# Patient Record
Sex: FEMALE | Race: WHITE | NOT HISPANIC OR LATINO
[De-identification: names, ages, dates, MRNs, and addresses within clinical notes are randomized per-mention and may not be internally consistent; named-entity substitution may affect disease eponyms.]

---

## 2018-04-04 ENCOUNTER — RECORD ABSTRACTING (OUTPATIENT)
Age: 68
End: 2018-04-04

## 2018-04-09 ENCOUNTER — APPOINTMENT (OUTPATIENT)
Dept: THORACIC SURGERY | Facility: CLINIC | Age: 68
End: 2018-04-09
Payer: MEDICARE

## 2018-04-09 VITALS
SYSTOLIC BLOOD PRESSURE: 188 MMHG | HEART RATE: 77 BPM | HEIGHT: 63 IN | RESPIRATION RATE: 16 BRPM | OXYGEN SATURATION: 99 % | WEIGHT: 160 LBS | BODY MASS INDEX: 28.35 KG/M2 | DIASTOLIC BLOOD PRESSURE: 98 MMHG

## 2018-04-09 DIAGNOSIS — Z82.49 FAMILY HISTORY OF ISCHEMIC HEART DISEASE AND OTHER DISEASES OF THE CIRCULATORY SYSTEM: ICD-10-CM

## 2018-04-09 PROCEDURE — 99205 OFFICE O/P NEW HI 60 MIN: CPT

## 2018-04-09 RX ORDER — PNV NO.95/FERROUS FUM/FOLIC AC 28MG-0.8MG
TABLET ORAL
Refills: 0 | Status: ACTIVE | COMMUNITY

## 2018-04-09 RX ORDER — UBIDECARENONE/VIT E ACET 100MG-5
CAPSULE ORAL
Refills: 0 | Status: ACTIVE | COMMUNITY

## 2018-04-09 RX ORDER — ASPIRIN ENTERIC COATED TABLETS 81 MG 81 MG/1
81 TABLET, DELAYED RELEASE ORAL
Refills: 0 | Status: ACTIVE | COMMUNITY

## 2018-04-12 ENCOUNTER — APPOINTMENT (OUTPATIENT)
Dept: NUCLEAR MEDICINE | Facility: CLINIC | Age: 68
End: 2018-04-12
Payer: MEDICARE

## 2018-04-12 ENCOUNTER — OUTPATIENT (OUTPATIENT)
Dept: OUTPATIENT SERVICES | Facility: HOSPITAL | Age: 68
LOS: 1 days | End: 2018-04-12

## 2018-04-12 DIAGNOSIS — Z00.8 ENCOUNTER FOR OTHER GENERAL EXAMINATION: ICD-10-CM

## 2018-04-12 PROCEDURE — 78815 PET IMAGE W/CT SKULL-THIGH: CPT | Mod: 26,PI

## 2018-04-16 ENCOUNTER — APPOINTMENT (OUTPATIENT)
Dept: THORACIC SURGERY | Facility: CLINIC | Age: 68
End: 2018-04-16
Payer: MEDICARE

## 2018-04-16 VITALS
HEART RATE: 78 BPM | RESPIRATION RATE: 16 BRPM | SYSTOLIC BLOOD PRESSURE: 210 MMHG | DIASTOLIC BLOOD PRESSURE: 110 MMHG | OXYGEN SATURATION: 98 % | HEIGHT: 63 IN | BODY MASS INDEX: 28.35 KG/M2 | WEIGHT: 160 LBS

## 2018-04-16 PROCEDURE — 99215 OFFICE O/P EST HI 40 MIN: CPT

## 2018-04-27 ENCOUNTER — OUTPATIENT (OUTPATIENT)
Dept: OUTPATIENT SERVICES | Facility: HOSPITAL | Age: 68
LOS: 1 days | End: 2018-04-27
Payer: MEDICARE

## 2018-04-27 VITALS
HEART RATE: 64 BPM | HEIGHT: 63 IN | TEMPERATURE: 97 F | DIASTOLIC BLOOD PRESSURE: 100 MMHG | WEIGHT: 169.09 LBS | RESPIRATION RATE: 16 BRPM | SYSTOLIC BLOOD PRESSURE: 200 MMHG

## 2018-04-27 DIAGNOSIS — Z29.9 ENCOUNTER FOR PROPHYLACTIC MEASURES, UNSPECIFIED: ICD-10-CM

## 2018-04-27 DIAGNOSIS — R91.8 OTHER NONSPECIFIC ABNORMAL FINDING OF LUNG FIELD: ICD-10-CM

## 2018-04-27 DIAGNOSIS — Z87.42 PERSONAL HISTORY OF OTHER DISEASES OF THE FEMALE GENITAL TRACT: Chronic | ICD-10-CM

## 2018-04-27 DIAGNOSIS — Z01.818 ENCOUNTER FOR OTHER PREPROCEDURAL EXAMINATION: ICD-10-CM

## 2018-04-27 DIAGNOSIS — I10 ESSENTIAL (PRIMARY) HYPERTENSION: ICD-10-CM

## 2018-04-27 DIAGNOSIS — D25.9 LEIOMYOMA OF UTERUS, UNSPECIFIED: Chronic | ICD-10-CM

## 2018-04-27 LAB
ANION GAP SERPL CALC-SCNC: 12 MMOL/L — SIGNIFICANT CHANGE UP (ref 5–17)
APTT BLD: 30.1 SEC — SIGNIFICANT CHANGE UP (ref 27.5–37.4)
BLD GP AB SCN SERPL QL: SIGNIFICANT CHANGE UP
BUN SERPL-MCNC: 19 MG/DL — SIGNIFICANT CHANGE UP (ref 8–20)
CALCIUM SERPL-MCNC: 9.2 MG/DL — SIGNIFICANT CHANGE UP (ref 8.6–10.2)
CHLORIDE SERPL-SCNC: 104 MMOL/L — SIGNIFICANT CHANGE UP (ref 98–107)
CO2 SERPL-SCNC: 27 MMOL/L — SIGNIFICANT CHANGE UP (ref 22–29)
CREAT SERPL-MCNC: 0.65 MG/DL — SIGNIFICANT CHANGE UP (ref 0.5–1.3)
GLUCOSE SERPL-MCNC: 91 MG/DL — SIGNIFICANT CHANGE UP (ref 70–115)
HCT VFR BLD CALC: 41.7 % — SIGNIFICANT CHANGE UP (ref 37–47)
HGB BLD-MCNC: 14.8 G/DL — SIGNIFICANT CHANGE UP (ref 12–16)
INR BLD: 0.99 RATIO — SIGNIFICANT CHANGE UP (ref 0.88–1.16)
MCHC RBC-ENTMCNC: 30 PG — SIGNIFICANT CHANGE UP (ref 27–31)
MCHC RBC-ENTMCNC: 35.5 G/DL — SIGNIFICANT CHANGE UP (ref 32–36)
MCV RBC AUTO: 84.4 FL — SIGNIFICANT CHANGE UP (ref 81–99)
PLATELET # BLD AUTO: 367 K/UL — SIGNIFICANT CHANGE UP (ref 150–400)
POTASSIUM SERPL-MCNC: 3.1 MMOL/L — LOW (ref 3.5–5.3)
POTASSIUM SERPL-SCNC: 3.1 MMOL/L — LOW (ref 3.5–5.3)
PROTHROM AB SERPL-ACNC: 10.9 SEC — SIGNIFICANT CHANGE UP (ref 9.8–12.7)
RBC # BLD: 4.94 M/UL — SIGNIFICANT CHANGE UP (ref 4.4–5.2)
RBC # FLD: 13.5 % — SIGNIFICANT CHANGE UP (ref 11–15.6)
SODIUM SERPL-SCNC: 143 MMOL/L — SIGNIFICANT CHANGE UP (ref 135–145)
TYPE + AB SCN PNL BLD: SIGNIFICANT CHANGE UP
WBC # BLD: 12 K/UL — HIGH (ref 4.8–10.8)
WBC # FLD AUTO: 12 K/UL — HIGH (ref 4.8–10.8)

## 2018-04-27 PROCEDURE — 93010 ELECTROCARDIOGRAM REPORT: CPT

## 2018-04-27 RX ORDER — CEFAZOLIN SODIUM 1 G
2000 VIAL (EA) INJECTION ONCE
Qty: 0 | Refills: 0 | Status: DISCONTINUED | OUTPATIENT
Start: 2018-05-11 | End: 2018-05-11

## 2018-04-27 NOTE — H&P PST ADULT - ASSESSMENT
CAPRINI SCORE [CLOT]    AGE RELATED RISK FACTORS                                                       MOBILITY RELATED FACTORS  [ ] Age 41-60 years                                            (1 Point)                  [ ] Bed rest                                                        (1 Point)  [x] Age: 61-74 years                                           (2 Points)                 [ ] Plaster cast                                                   (2 Points)  [ ] Age= 75 years                                              (3 Points)                 [ ] Bed bound for more than 72 hours                 (2 Points)    DISEASE RELATED RISK FACTORS                                               GENDER SPECIFIC FACTORS  [ ] Edema in the lower extremities                       (1 Point)                  [ ] Pregnancy                                                     (1 Point)  [ ] Varicose veins                                               (1 Point)                  [ ] Post-partum < 6 weeks                                   (1 Point)             [x ] BMI > 25 Kg/m2                                            (1 Point)                  [ ] Hormonal therapy  or oral contraception          (1 Point)                 [ ] Sepsis (in the previous month)                        (1 Point)                  [ ] History of pregnancy complications                 (1 point)  [ ] Pneumonia or serious lung disease                                               [ ] Unexplained or recurrent                     (1 Point)           (in the previous month)                               (1 Point)  [ ] Abnormal pulmonary function test                     (1 Point)                 SURGERY RELATED RISK FACTORS  [ ] Acute myocardial infarction                              (1 Point)                 [ ]  Section                                             (1 Point)  [ ] Congestive heart failure (in the previous month)  (1 Point)               [ ] Minor surgery                                                  (1 Point)   [ ] Inflammatory bowel disease                             (1 Point)                 [ ] Arthroscopic surgery                                        (2 Points)  [ ] Central venous access                                      (2 Points)                [x ] General surgery lasting more than 45 minutes   (2 Points)       [ ] Stroke (in the previous month)                          (5 Points)               [ ] Elective arthroplasty                                         (5 Points)                                                                                                                                               HEMATOLOGY RELATED FACTORS                                                 TRAUMA RELATED RISK FACTORS  [ ] Prior episodes of VTE                                     (3 Points)                 [ ] Fracture of the hip, pelvis, or leg                       (5 Points)  [ ] Positive family history for VTE                         (3 Points)                 [ ] Acute spinal cord injury (in the previous month)  (5 Points)  [ ] Prothrombin 98310 A                                     (3 Points)                 [ ] Paralysis  (less than 1 month)                             (5 Points)  [ ] Factor V Leiden                                             (3 Points)                  [ ] Multiple Trauma within 1 month                        (5 Points)  [ ] Lupus anticoagulants                                     (3 Points)                                                           [ ] Anticardiolipin antibodies                               (3 Points)                                                       [ ] High homocysteine in the blood                      (3 Points)                                             [ ] Other congenital or acquired thrombophilia      (3 Points)                                                [ ] Heparin induced thrombocytopenia                  (3 Points)                                          Total Score [       5   ]

## 2018-04-27 NOTE — H&P PST ADULT - FAMILY HISTORY
Father  Still living? No  Family history of heart disease, Age at diagnosis: Age Unknown     Sibling  Still living? Yes, Estimated age: 61-70  Family history of hypertension, Age at diagnosis: Age Unknown     Sibling  Still living? Yes, Estimated age: 61-70  Family history of premature CAD, Age at diagnosis: 61-70

## 2018-04-27 NOTE — H&P PST ADULT - PROBLEM SELECTOR PLAN 1
Flexible Bronchoscopy, Right Video Assisted Thorascopic Surgery  Medical Clearance  Cardiology Clearance (Per surgeon)  Pulmonary Clearance (Per surgeon)

## 2018-04-27 NOTE — H&P PST ADULT - LAB RESULTS AND INTERPRETATION
4/27/2018 Spoke with Dr. Mancini's office regarding pt's elevated BP while in PST and also abnormal WBC and Potassium levels.  Results faxed by myself (ADDIE JOSEPH)

## 2018-04-27 NOTE — H&P PST ADULT - EKG AND INTERPRETATION
EKG demonstrates Sinus Bradycardia at 55 bpm.  ST/T wave changes noted in Inferior and Lateral leads.  Pending official reading

## 2018-04-27 NOTE — H&P PST ADULT - NSANTHOSAYNRD_GEN_A_CORE
No. TODD screening performed.  STOP BANG Legend: 0-2 = LOW Risk; 3-4 = INTERMEDIATE Risk; 5-8 = HIGH Risk

## 2018-04-27 NOTE — H&P PST ADULT - HISTORY OF PRESENT ILLNESS
This is a 68 y.o female who presents to PST today.  The pt reports she went to see a new medical physician and underwent routine testing which included a chest x-ray.  An incidental finding demonstrated a right lung mass.  She currently denies any symptoms.

## 2018-04-27 NOTE — H&P PST ADULT - RS GEN PE MLT RESP DETAILS PC
diminished breath sounds, L/respirations non-labored/normal/airway patent/diminished breath sounds, R

## 2018-05-01 ENCOUNTER — APPOINTMENT (OUTPATIENT)
Dept: PULMONOLOGY | Facility: CLINIC | Age: 68
End: 2018-05-01
Payer: MEDICARE

## 2018-05-01 VITALS — HEART RATE: 65 BPM | DIASTOLIC BLOOD PRESSURE: 100 MMHG | OXYGEN SATURATION: 96 % | SYSTOLIC BLOOD PRESSURE: 160 MMHG

## 2018-05-01 DIAGNOSIS — I10 ESSENTIAL (PRIMARY) HYPERTENSION: ICD-10-CM

## 2018-05-01 PROCEDURE — 85018 HEMOGLOBIN: CPT | Mod: QW

## 2018-05-01 PROCEDURE — 94664 DEMO&/EVAL PT USE INHALER: CPT | Mod: 59

## 2018-05-01 PROCEDURE — 99205 OFFICE O/P NEW HI 60 MIN: CPT | Mod: 25

## 2018-05-01 PROCEDURE — 94060 EVALUATION OF WHEEZING: CPT

## 2018-05-01 PROCEDURE — 94729 DIFFUSING CAPACITY: CPT

## 2018-05-01 PROCEDURE — 94727 GAS DIL/WSHOT DETER LNG VOL: CPT

## 2018-05-08 ENCOUNTER — APPOINTMENT (OUTPATIENT)
Dept: PULMONOLOGY | Facility: CLINIC | Age: 68
End: 2018-05-08
Payer: MEDICARE

## 2018-05-08 VITALS — WEIGHT: 165 LBS | BODY MASS INDEX: 29.23 KG/M2

## 2018-05-08 PROCEDURE — 94010 BREATHING CAPACITY TEST: CPT

## 2018-05-10 ENCOUNTER — TRANSCRIPTION ENCOUNTER (OUTPATIENT)
Age: 68
End: 2018-05-10

## 2018-05-11 ENCOUNTER — RESULT REVIEW (OUTPATIENT)
Age: 68
End: 2018-05-11

## 2018-05-11 ENCOUNTER — INPATIENT (INPATIENT)
Facility: HOSPITAL | Age: 68
LOS: 3 days | Discharge: HOME | DRG: 165 | End: 2018-05-15
Attending: THORACIC SURGERY (CARDIOTHORACIC VASCULAR SURGERY) | Admitting: THORACIC SURGERY (CARDIOTHORACIC VASCULAR SURGERY)
Payer: MEDICARE

## 2018-05-11 ENCOUNTER — APPOINTMENT (OUTPATIENT)
Dept: THORACIC SURGERY | Facility: HOSPITAL | Age: 68
End: 2018-05-11
Payer: MEDICARE

## 2018-05-11 VITALS
RESPIRATION RATE: 16 BRPM | WEIGHT: 164.02 LBS | OXYGEN SATURATION: 99 % | TEMPERATURE: 98 F | DIASTOLIC BLOOD PRESSURE: 69 MMHG | HEART RATE: 68 BPM | SYSTOLIC BLOOD PRESSURE: 152 MMHG | HEIGHT: 63 IN

## 2018-05-11 DIAGNOSIS — Z87.42 PERSONAL HISTORY OF OTHER DISEASES OF THE FEMALE GENITAL TRACT: Chronic | ICD-10-CM

## 2018-05-11 DIAGNOSIS — D25.9 LEIOMYOMA OF UTERUS, UNSPECIFIED: Chronic | ICD-10-CM

## 2018-05-11 DIAGNOSIS — R22.2 LOCALIZED SWELLING, MASS AND LUMP, TRUNK: ICD-10-CM

## 2018-05-11 LAB
ABO RH CONFIRMATION: SIGNIFICANT CHANGE UP
POTASSIUM SERPL-MCNC: 3.8 MMOL/L — SIGNIFICANT CHANGE UP (ref 3.5–5.3)
POTASSIUM SERPL-SCNC: 3.8 MMOL/L — SIGNIFICANT CHANGE UP (ref 3.5–5.3)

## 2018-05-11 PROCEDURE — 71045 X-RAY EXAM CHEST 1 VIEW: CPT | Mod: 26

## 2018-05-11 PROCEDURE — 32663 THORACOSCOPY W/LOBECTOMY: CPT

## 2018-05-11 PROCEDURE — 85027 COMPLETE CBC AUTOMATED: CPT

## 2018-05-11 PROCEDURE — 86923 COMPATIBILITY TEST ELECTRIC: CPT

## 2018-05-11 PROCEDURE — 88341 IMHCHEM/IMCYTCHM EA ADD ANTB: CPT | Mod: 26

## 2018-05-11 PROCEDURE — 86850 RBC ANTIBODY SCREEN: CPT

## 2018-05-11 PROCEDURE — 88307 TISSUE EXAM BY PATHOLOGIST: CPT | Mod: 26

## 2018-05-11 PROCEDURE — 32668 THORACOSCOPY W/W RESECT DIAG: CPT

## 2018-05-11 PROCEDURE — 86901 BLOOD TYPING SEROLOGIC RH(D): CPT

## 2018-05-11 PROCEDURE — 80048 BASIC METABOLIC PNL TOTAL CA: CPT

## 2018-05-11 PROCEDURE — 85610 PROTHROMBIN TIME: CPT

## 2018-05-11 PROCEDURE — 32663 THORACOSCOPY W/LOBECTOMY: CPT | Mod: AS

## 2018-05-11 PROCEDURE — 32668 THORACOSCOPY W/W RESECT DIAG: CPT | Mod: AS

## 2018-05-11 PROCEDURE — 93005 ELECTROCARDIOGRAM TRACING: CPT

## 2018-05-11 PROCEDURE — 85730 THROMBOPLASTIN TIME PARTIAL: CPT

## 2018-05-11 PROCEDURE — 36415 COLL VENOUS BLD VENIPUNCTURE: CPT

## 2018-05-11 PROCEDURE — 93010 ELECTROCARDIOGRAM REPORT: CPT

## 2018-05-11 PROCEDURE — 88309 TISSUE EXAM BY PATHOLOGIST: CPT | Mod: 26

## 2018-05-11 PROCEDURE — 86900 BLOOD TYPING SEROLOGIC ABO: CPT

## 2018-05-11 PROCEDURE — G0463: CPT

## 2018-05-11 PROCEDURE — 88342 IMHCHEM/IMCYTCHM 1ST ANTB: CPT | Mod: 26

## 2018-05-11 RX ORDER — IPRATROPIUM BROMIDE 0.2 MG/ML
500 SOLUTION, NON-ORAL INHALATION EVERY 6 HOURS
Qty: 0 | Refills: 0 | Status: DISCONTINUED | OUTPATIENT
Start: 2018-05-11 | End: 2018-05-15

## 2018-05-11 RX ORDER — DOCUSATE SODIUM 100 MG
100 CAPSULE ORAL THREE TIMES A DAY
Qty: 0 | Refills: 0 | Status: DISCONTINUED | OUTPATIENT
Start: 2018-05-11 | End: 2018-05-15

## 2018-05-11 RX ORDER — SENNA PLUS 8.6 MG/1
2 TABLET ORAL AT BEDTIME
Qty: 0 | Refills: 0 | Status: DISCONTINUED | OUTPATIENT
Start: 2018-05-11 | End: 2018-05-15

## 2018-05-11 RX ORDER — ONDANSETRON 8 MG/1
4 TABLET, FILM COATED ORAL ONCE
Qty: 0 | Refills: 0 | Status: DISCONTINUED | OUTPATIENT
Start: 2018-05-11 | End: 2018-05-11

## 2018-05-11 RX ORDER — VALSARTAN 80 MG/1
160 TABLET ORAL DAILY
Qty: 0 | Refills: 0 | Status: DISCONTINUED | OUTPATIENT
Start: 2018-05-12 | End: 2018-05-13

## 2018-05-11 RX ORDER — POTASSIUM CHLORIDE 20 MEQ
10 PACKET (EA) ORAL DAILY
Qty: 0 | Refills: 0 | Status: DISCONTINUED | OUTPATIENT
Start: 2018-05-11 | End: 2018-05-15

## 2018-05-11 RX ORDER — PREGABALIN 225 MG/1
500 CAPSULE ORAL DAILY
Qty: 0 | Refills: 0 | Status: DISCONTINUED | OUTPATIENT
Start: 2018-05-11 | End: 2018-05-15

## 2018-05-11 RX ORDER — METOPROLOL TARTRATE 50 MG
50 TABLET ORAL
Qty: 0 | Refills: 0 | Status: DISCONTINUED | OUTPATIENT
Start: 2018-05-12 | End: 2018-05-15

## 2018-05-11 RX ORDER — KETOROLAC TROMETHAMINE 30 MG/ML
30 SYRINGE (ML) INJECTION EVERY 6 HOURS
Qty: 0 | Refills: 0 | Status: DISCONTINUED | OUTPATIENT
Start: 2018-05-11 | End: 2018-05-15

## 2018-05-11 RX ORDER — METOPROLOL TARTRATE 50 MG
50 TABLET ORAL ONCE
Qty: 0 | Refills: 0 | Status: COMPLETED | OUTPATIENT
Start: 2018-05-11 | End: 2018-05-11

## 2018-05-11 RX ORDER — FENTANYL CITRATE 50 UG/ML
25 INJECTION INTRAVENOUS
Qty: 0 | Refills: 0 | Status: DISCONTINUED | OUTPATIENT
Start: 2018-05-11 | End: 2018-05-11

## 2018-05-11 RX ORDER — ALBUTEROL 90 UG/1
2.5 AEROSOL, METERED ORAL EVERY 6 HOURS
Qty: 0 | Refills: 0 | Status: DISCONTINUED | OUTPATIENT
Start: 2018-05-11 | End: 2018-05-15

## 2018-05-11 RX ORDER — ENOXAPARIN SODIUM 100 MG/ML
40 INJECTION SUBCUTANEOUS DAILY
Qty: 0 | Refills: 0 | Status: DISCONTINUED | OUTPATIENT
Start: 2018-05-12 | End: 2018-05-14

## 2018-05-11 RX ORDER — ALBUTEROL 90 UG/1
2 AEROSOL, METERED ORAL EVERY 6 HOURS
Qty: 0 | Refills: 0 | Status: DISCONTINUED | OUTPATIENT
Start: 2018-05-11 | End: 2018-05-15

## 2018-05-11 RX ORDER — SODIUM CHLORIDE 9 MG/ML
1000 INJECTION, SOLUTION INTRAVENOUS
Qty: 0 | Refills: 0 | Status: DISCONTINUED | OUTPATIENT
Start: 2018-05-11 | End: 2018-05-11

## 2018-05-11 RX ORDER — AMLODIPINE BESYLATE 2.5 MG/1
5 TABLET ORAL DAILY
Qty: 0 | Refills: 0 | Status: DISCONTINUED | OUTPATIENT
Start: 2018-05-12 | End: 2018-05-13

## 2018-05-11 RX ORDER — ACETAMINOPHEN 500 MG
1000 TABLET ORAL ONCE
Qty: 0 | Refills: 0 | Status: DISCONTINUED | OUTPATIENT
Start: 2018-05-11 | End: 2018-05-15

## 2018-05-11 RX ORDER — SODIUM CHLORIDE 9 MG/ML
3 INJECTION INTRAMUSCULAR; INTRAVENOUS; SUBCUTANEOUS ONCE
Qty: 0 | Refills: 0 | Status: DISCONTINUED | OUTPATIENT
Start: 2018-05-11 | End: 2018-05-11

## 2018-05-11 RX ORDER — FENTANYL CITRATE 50 UG/ML
30 INJECTION INTRAVENOUS
Qty: 0 | Refills: 0 | Status: DISCONTINUED | OUTPATIENT
Start: 2018-05-11 | End: 2018-05-13

## 2018-05-11 RX ORDER — ASPIRIN/CALCIUM CARB/MAGNESIUM 324 MG
81 TABLET ORAL DAILY
Qty: 0 | Refills: 0 | Status: DISCONTINUED | OUTPATIENT
Start: 2018-05-12 | End: 2018-05-15

## 2018-05-11 RX ORDER — NALOXONE HYDROCHLORIDE 4 MG/.1ML
0.1 SPRAY NASAL
Qty: 0 | Refills: 0 | Status: DISCONTINUED | OUTPATIENT
Start: 2018-05-11 | End: 2018-05-15

## 2018-05-11 RX ORDER — CEFAZOLIN SODIUM 1 G
2000 VIAL (EA) INJECTION ONCE
Qty: 0 | Refills: 0 | Status: DISCONTINUED | OUTPATIENT
Start: 2018-05-11 | End: 2018-05-11

## 2018-05-11 RX ORDER — ONDANSETRON 8 MG/1
4 TABLET, FILM COATED ORAL EVERY 6 HOURS
Qty: 0 | Refills: 0 | Status: DISCONTINUED | OUTPATIENT
Start: 2018-05-11 | End: 2018-05-15

## 2018-05-11 RX ORDER — PANTOPRAZOLE SODIUM 20 MG/1
40 TABLET, DELAYED RELEASE ORAL
Qty: 0 | Refills: 0 | Status: DISCONTINUED | OUTPATIENT
Start: 2018-05-11 | End: 2018-05-15

## 2018-05-11 RX ORDER — DIPHENHYDRAMINE HCL 50 MG
12.5 CAPSULE ORAL EVERY 4 HOURS
Qty: 0 | Refills: 0 | Status: DISCONTINUED | OUTPATIENT
Start: 2018-05-11 | End: 2018-05-13

## 2018-05-11 RX ORDER — METOPROLOL TARTRATE 50 MG
1 TABLET ORAL
Qty: 0 | Refills: 0 | COMMUNITY

## 2018-05-11 RX ADMIN — FENTANYL CITRATE 25 MICROGRAM(S): 50 INJECTION INTRAVENOUS at 11:00

## 2018-05-11 RX ADMIN — ALBUTEROL 2 PUFF(S): 90 AEROSOL, METERED ORAL at 20:27

## 2018-05-11 RX ADMIN — FENTANYL CITRATE 25 MICROGRAM(S): 50 INJECTION INTRAVENOUS at 12:19

## 2018-05-11 RX ADMIN — FENTANYL CITRATE 30 MILLILITER(S): 50 INJECTION INTRAVENOUS at 13:43

## 2018-05-11 RX ADMIN — FENTANYL CITRATE 25 MICROGRAM(S): 50 INJECTION INTRAVENOUS at 11:30

## 2018-05-11 RX ADMIN — FENTANYL CITRATE 25 MICROGRAM(S): 50 INJECTION INTRAVENOUS at 12:20

## 2018-05-11 RX ADMIN — FENTANYL CITRATE 30 MILLILITER(S): 50 INJECTION INTRAVENOUS at 12:10

## 2018-05-11 RX ADMIN — FENTANYL CITRATE 25 MICROGRAM(S): 50 INJECTION INTRAVENOUS at 11:20

## 2018-05-11 RX ADMIN — ALBUTEROL 2 PUFF(S): 90 AEROSOL, METERED ORAL at 14:40

## 2018-05-11 RX ADMIN — Medication 100 MILLIGRAM(S): at 21:40

## 2018-05-11 RX ADMIN — Medication 100 MILLIGRAM(S): at 14:28

## 2018-05-11 RX ADMIN — SENNA PLUS 2 TABLET(S): 8.6 TABLET ORAL at 21:39

## 2018-05-11 RX ADMIN — FENTANYL CITRATE 25 MICROGRAM(S): 50 INJECTION INTRAVENOUS at 11:10

## 2018-05-11 RX ADMIN — FENTANYL CITRATE 30 MILLILITER(S): 50 INJECTION INTRAVENOUS at 19:58

## 2018-05-11 RX ADMIN — Medication 50 MILLIGRAM(S): at 17:05

## 2018-05-11 RX ADMIN — Medication 10 MILLIEQUIVALENT(S): at 14:29

## 2018-05-11 RX ADMIN — FENTANYL CITRATE 25 MICROGRAM(S): 50 INJECTION INTRAVENOUS at 11:40

## 2018-05-11 RX ADMIN — Medication 30 MILLIGRAM(S): at 16:54

## 2018-05-11 NOTE — BRIEF OPERATIVE NOTE - PROCEDURE
<<-----Click on this checkbox to enter Procedure Lobectomy of right lung with video-assisted thoracoscopic surgery  05/11/2018  right lower lung  Active  MHOERNING  Bronchoscopy, flexible  05/11/2018    Active  MHOERNING  VATS (video-assisted thoracoscopic surgery)  05/11/2018  right lower lung  Active  MHOERNING

## 2018-05-12 DIAGNOSIS — J44.9 CHRONIC OBSTRUCTIVE PULMONARY DISEASE, UNSPECIFIED: ICD-10-CM

## 2018-05-12 DIAGNOSIS — R91.8 OTHER NONSPECIFIC ABNORMAL FINDING OF LUNG FIELD: ICD-10-CM

## 2018-05-12 DIAGNOSIS — I10 ESSENTIAL (PRIMARY) HYPERTENSION: ICD-10-CM

## 2018-05-12 LAB
ALBUMIN SERPL ELPH-MCNC: 3.7 G/DL — SIGNIFICANT CHANGE UP (ref 3.3–5.2)
ALP SERPL-CCNC: 88 U/L — SIGNIFICANT CHANGE UP (ref 40–120)
ALT FLD-CCNC: 10 U/L — SIGNIFICANT CHANGE UP
ANION GAP SERPL CALC-SCNC: 16 MMOL/L — SIGNIFICANT CHANGE UP (ref 5–17)
AST SERPL-CCNC: 13 U/L — SIGNIFICANT CHANGE UP
BILIRUB SERPL-MCNC: 0.4 MG/DL — SIGNIFICANT CHANGE UP (ref 0.4–2)
BUN SERPL-MCNC: 17 MG/DL — SIGNIFICANT CHANGE UP (ref 8–20)
CALCIUM SERPL-MCNC: 9.4 MG/DL — SIGNIFICANT CHANGE UP (ref 8.6–10.2)
CHLORIDE SERPL-SCNC: 97 MMOL/L — LOW (ref 98–107)
CO2 SERPL-SCNC: 25 MMOL/L — SIGNIFICANT CHANGE UP (ref 22–29)
CREAT SERPL-MCNC: 0.6 MG/DL — SIGNIFICANT CHANGE UP (ref 0.5–1.3)
GLUCOSE SERPL-MCNC: 115 MG/DL — SIGNIFICANT CHANGE UP (ref 70–115)
HCT VFR BLD CALC: 42.9 % — SIGNIFICANT CHANGE UP (ref 37–47)
HGB BLD-MCNC: 14.4 G/DL — SIGNIFICANT CHANGE UP (ref 12–16)
MAGNESIUM SERPL-MCNC: 2.3 MG/DL — SIGNIFICANT CHANGE UP (ref 1.6–2.6)
MCHC RBC-ENTMCNC: 28.5 PG — SIGNIFICANT CHANGE UP (ref 27–31)
MCHC RBC-ENTMCNC: 33.6 G/DL — SIGNIFICANT CHANGE UP (ref 32–36)
MCV RBC AUTO: 84.8 FL — SIGNIFICANT CHANGE UP (ref 81–99)
PLATELET # BLD AUTO: 381 K/UL — SIGNIFICANT CHANGE UP (ref 150–400)
POTASSIUM SERPL-MCNC: 4.4 MMOL/L — SIGNIFICANT CHANGE UP (ref 3.5–5.3)
POTASSIUM SERPL-SCNC: 4.4 MMOL/L — SIGNIFICANT CHANGE UP (ref 3.5–5.3)
PROT SERPL-MCNC: 6.5 G/DL — LOW (ref 6.6–8.7)
RBC # BLD: 5.06 M/UL — SIGNIFICANT CHANGE UP (ref 4.4–5.2)
RBC # FLD: 14.2 % — SIGNIFICANT CHANGE UP (ref 11–15.6)
SODIUM SERPL-SCNC: 138 MMOL/L — SIGNIFICANT CHANGE UP (ref 135–145)
WBC # BLD: 24.5 K/UL — HIGH (ref 4.8–10.8)
WBC # FLD AUTO: 24.5 K/UL — HIGH (ref 4.8–10.8)

## 2018-05-12 PROCEDURE — 71045 X-RAY EXAM CHEST 1 VIEW: CPT | Mod: 26

## 2018-05-12 RX ORDER — HYDRALAZINE HCL 50 MG
5 TABLET ORAL ONCE
Qty: 0 | Refills: 0 | Status: COMPLETED | OUTPATIENT
Start: 2018-05-12 | End: 2018-05-12

## 2018-05-12 RX ORDER — HYDRALAZINE HCL 50 MG
10 TABLET ORAL ONCE
Qty: 0 | Refills: 0 | Status: COMPLETED | OUTPATIENT
Start: 2018-05-12 | End: 2018-05-12

## 2018-05-12 RX ADMIN — ALBUTEROL 2 PUFF(S): 90 AEROSOL, METERED ORAL at 15:09

## 2018-05-12 RX ADMIN — PANTOPRAZOLE SODIUM 40 MILLIGRAM(S): 20 TABLET, DELAYED RELEASE ORAL at 05:33

## 2018-05-12 RX ADMIN — Medication 81 MILLIGRAM(S): at 11:26

## 2018-05-12 RX ADMIN — ENOXAPARIN SODIUM 40 MILLIGRAM(S): 100 INJECTION SUBCUTANEOUS at 21:42

## 2018-05-12 RX ADMIN — FENTANYL CITRATE 30 MILLILITER(S): 50 INJECTION INTRAVENOUS at 19:42

## 2018-05-12 RX ADMIN — Medication 100 MILLIGRAM(S): at 05:34

## 2018-05-12 RX ADMIN — Medication 100 MILLIGRAM(S): at 11:26

## 2018-05-12 RX ADMIN — VALSARTAN 160 MILLIGRAM(S): 80 TABLET ORAL at 05:33

## 2018-05-12 RX ADMIN — Medication 10 MILLIGRAM(S): at 21:41

## 2018-05-12 RX ADMIN — FENTANYL CITRATE 30 MILLILITER(S): 50 INJECTION INTRAVENOUS at 07:32

## 2018-05-12 RX ADMIN — AMLODIPINE BESYLATE 5 MILLIGRAM(S): 2.5 TABLET ORAL at 05:34

## 2018-05-12 RX ADMIN — PREGABALIN 500 MICROGRAM(S): 225 CAPSULE ORAL at 11:26

## 2018-05-12 RX ADMIN — Medication 5 MILLIGRAM(S): at 23:40

## 2018-05-12 RX ADMIN — ALBUTEROL 2 PUFF(S): 90 AEROSOL, METERED ORAL at 20:14

## 2018-05-12 RX ADMIN — SENNA PLUS 2 TABLET(S): 8.6 TABLET ORAL at 21:41

## 2018-05-12 RX ADMIN — Medication 100 MILLIGRAM(S): at 21:41

## 2018-05-12 RX ADMIN — Medication 50 MILLIGRAM(S): at 05:33

## 2018-05-12 RX ADMIN — ALBUTEROL 2 PUFF(S): 90 AEROSOL, METERED ORAL at 03:43

## 2018-05-12 RX ADMIN — Medication 50 MILLIGRAM(S): at 17:15

## 2018-05-12 RX ADMIN — Medication 10 MILLIEQUIVALENT(S): at 11:26

## 2018-05-12 RX ADMIN — ALBUTEROL 2 PUFF(S): 90 AEROSOL, METERED ORAL at 08:13

## 2018-05-12 NOTE — PROGRESS NOTE ADULT - SUBJECTIVE AND OBJECTIVE BOX
SUBJECTIVE    REVIEW OF SYSTEMS    General: Not in any pain	    Skin/Breast: No rash  	  ENMT: No visual problems, no sore throat	    Respiratory and Thorax: No cough, No CP, No SOB  	  Cardiovascular: No CP, No palpitations    Gastrointestinal: No Abd pain, No N/V/D    Musculoskeletal: No Joint pain, No back pain	    Neurological: No headache    Psychiatric: No anxiety      OBJECTIVE    Vital Signs Last 24 Hrs  T(C): 36.8 (05-12-18 @ 07:38), Max: 37.2 (05-12-18 @ 01:02)  T(F): 98.2 (05-12-18 @ 07:38), Max: 99 (05-12-18 @ 01:02)  HR: 91 (05-12-18 @ 15:12) (69 - 93)  BP: 138/88 (05-12-18 @ 07:38) (138/88 - 184/82)  BP(mean): --  RR: 19 (05-12-18 @ 08:00) (18 - 19)  SpO2: 94% (05-12-18 @ 15:12) (94% - 100%)    PHYSICAL EXAM:    Constitutional: Not in any distress    Eyes: No conjunctival injection    ENMT: No oral lesions    Neck: No nodes, no adenopathy    Back: Straight, no defects    Respiratory: clear b/l    Cardiovascular: RRR, no murmur    Gastrointestinal: soft, NT, ND    Extremities: No edema, no erythema    Neurological: no focal deficit    Skin: No rash      MEDICATIONS  (STANDING):  ALBUTerol    90 MICROgram(s) HFA Inhaler 2 Puff(s) Inhalation every 6 hours  amLODIPine   Tablet 5 milliGRAM(s) Oral daily  aspirin enteric coated 81 milliGRAM(s) Oral daily  cyanocobalamin 500 MICROGram(s) Oral daily  docusate sodium 100 milliGRAM(s) Oral three times a day  enoxaparin Injectable 40 milliGRAM(s) SubCutaneous daily  fentaNYL PCA (50 MICROgram(s)/mL) 30 milliLiter(s) PCA Continuous PCA Continuous  metoprolol tartrate 50 milliGRAM(s) Oral two times a day  pantoprazole    Tablet 40 milliGRAM(s) Oral before breakfast  potassium chloride    Tablet ER 10 milliEquivalent(s) Oral daily  senna 2 Tablet(s) Oral at bedtime  valsartan 160 milliGRAM(s) Oral daily    MEDICATIONS  (PRN):  acetaminophen  IVPB. 1000 milliGRAM(s) IV Intermittent once PRN break through pain  ALBUTerol    0.083% 2.5 milliGRAM(s) Nebulizer every 6 hours PRN Shortness of Breath and/or Wheezing  diphenhydrAMINE   Injectable 12.5 milliGRAM(s) IV Push every 4 hours PRN Pruritus  ipratropium    for Nebulization 500 MICROGram(s) Nebulizer every 6 hours PRN Shortness of Breath and/or Wheezing  ketorolac   Injectable 30 milliGRAM(s) IV Push every 6 hours PRN BREAK THROUGH PAIN  naloxone Injectable 0.1 milliGRAM(s) IV Push every 3 minutes PRN For ANY of the following changes in patient status:  A. RR LESS THAN 10 breaths per minute, B. Oxygen saturation LESS THAN 90%, C. Sedation score of 6  ondansetron Injectable 4 milliGRAM(s) IV Push every 6 hours PRN Nausea                              14.4   24.5  )-----------( 381      ( 12 May 2018 05:58 )             42.9     12 May 2018 05:58    138    |  97     |  17.0   ----------------------------<  115    4.4     |  25.0   |  0.60     Ca    9.4        12 May 2018 05:58  Mg     2.3       12 May 2018 05:58    TPro  6.5    /  Alb  3.7    /  TBili  0.4    /  DBili  x      /  AST  13     /  ALT  10     /  AlkPhos  88     12 May 2018 05:58    Allergies    No Known Allergies    Intolerances

## 2018-05-12 NOTE — PROGRESS NOTE ADULT - SUBJECTIVE AND OBJECTIVE BOX
Subjective: "I was hoping to go home today" patient seen and examined, denies cp, sob.     Vital Signs:  Vital Signs Last 24 Hrs  T(C): 37 (05-12-18 @ 16:40), Max: 37.2 (05-12-18 @ 01:02)  T(F): 98.6 (05-12-18 @ 16:40), Max: 99 (05-12-18 @ 01:02)  HR: 86 (05-12-18 @ 16:40) (69 - 93)  BP: 164/88 (05-12-18 @ 16:40) (138/88 - 164/88)  RR: 20 (05-12-18 @ 16:40) (18 - 20)  SpO2: 93% (05-12-18 @ 16:40) (93% - 100%) on RA    Telemetry/Alarms: ST 60s, one desat overnight    Relevant labs, radiology and Medications reviewed    Pertinent Physical Exam  General: WN/WD NAD  Neurology: A&Ox3, nonfocal, VAN x 4  Respiratory: diminished left, rhonchi right  CV: RRR, S1S2, no murmurs, rubs or gallops  Abdominal: Soft, NT, ND +hypoactive BS, Last BM preop, denies flatus  Extremities: No edema, + peripheral pulses  Incisions: right lateral chest wall dressing c/d/i, small incision above dressing + ecchymosis, c/d/i open to air  Tubes: serosang drainage from right side chest tube, high tidaling, possible small air leak.    05-11 @ 07:01  -  05-12 @ 07:00  --------------------------------------------------------  IN:    lactated ringers.: 300 mL    Oral Fluid: 100 mL  Total IN: 400 mL    OUT:    Chest Tube: 188 mL  Total OUT: 188 mL    Total NET: 212 mL      05-12 @ 07:01  -  05-12 @ 16:45  --------------------------------------------------------  IN:  Total IN: 0 mL    OUT:    Chest Tube: 240 mL  Total OUT: 240 mL    Total NET: -240 mL

## 2018-05-13 DIAGNOSIS — I48.91 UNSPECIFIED ATRIAL FIBRILLATION: ICD-10-CM

## 2018-05-13 LAB
ALBUMIN SERPL ELPH-MCNC: 3.4 G/DL — SIGNIFICANT CHANGE UP (ref 3.3–5.2)
ALP SERPL-CCNC: 97 U/L — SIGNIFICANT CHANGE UP (ref 40–120)
ALT FLD-CCNC: 9 U/L — SIGNIFICANT CHANGE UP
ANION GAP SERPL CALC-SCNC: 13 MMOL/L — SIGNIFICANT CHANGE UP (ref 5–17)
ANION GAP SERPL CALC-SCNC: 16 MMOL/L — SIGNIFICANT CHANGE UP (ref 5–17)
APPEARANCE UR: CLEAR — SIGNIFICANT CHANGE UP
AST SERPL-CCNC: 16 U/L — SIGNIFICANT CHANGE UP
BACTERIA # UR AUTO: ABNORMAL
BILIRUB SERPL-MCNC: 0.5 MG/DL — SIGNIFICANT CHANGE UP (ref 0.4–2)
BILIRUB UR-MCNC: NEGATIVE — SIGNIFICANT CHANGE UP
BUN SERPL-MCNC: 24 MG/DL — HIGH (ref 8–20)
BUN SERPL-MCNC: 25 MG/DL — HIGH (ref 8–20)
CALCIUM SERPL-MCNC: 8.6 MG/DL — SIGNIFICANT CHANGE UP (ref 8.6–10.2)
CALCIUM SERPL-MCNC: 8.7 MG/DL — SIGNIFICANT CHANGE UP (ref 8.6–10.2)
CHLORIDE SERPL-SCNC: 98 MMOL/L — SIGNIFICANT CHANGE UP (ref 98–107)
CHLORIDE SERPL-SCNC: 98 MMOL/L — SIGNIFICANT CHANGE UP (ref 98–107)
CK SERPL-CCNC: 132 U/L — SIGNIFICANT CHANGE UP (ref 25–170)
CO2 SERPL-SCNC: 23 MMOL/L — SIGNIFICANT CHANGE UP (ref 22–29)
CO2 SERPL-SCNC: 24 MMOL/L — SIGNIFICANT CHANGE UP (ref 22–29)
COLOR SPEC: ABNORMAL
CREAT SERPL-MCNC: 0.6 MG/DL — SIGNIFICANT CHANGE UP (ref 0.5–1.3)
CREAT SERPL-MCNC: 0.62 MG/DL — SIGNIFICANT CHANGE UP (ref 0.5–1.3)
DIFF PNL FLD: ABNORMAL
EPI CELLS # UR: SIGNIFICANT CHANGE UP
GLUCOSE SERPL-MCNC: 106 MG/DL — SIGNIFICANT CHANGE UP (ref 70–115)
GLUCOSE SERPL-MCNC: 125 MG/DL — HIGH (ref 70–115)
GLUCOSE UR QL: NEGATIVE MG/DL — SIGNIFICANT CHANGE UP
HCT VFR BLD CALC: 44.3 % — SIGNIFICANT CHANGE UP (ref 37–47)
HCT VFR BLD CALC: 44.6 % — SIGNIFICANT CHANGE UP (ref 37–47)
HGB BLD-MCNC: 14.8 G/DL — SIGNIFICANT CHANGE UP (ref 12–16)
HGB BLD-MCNC: 14.8 G/DL — SIGNIFICANT CHANGE UP (ref 12–16)
KETONES UR-MCNC: NEGATIVE — SIGNIFICANT CHANGE UP
LEUKOCYTE ESTERASE UR-ACNC: ABNORMAL
MAGNESIUM SERPL-MCNC: 2.2 MG/DL — SIGNIFICANT CHANGE UP (ref 1.6–2.6)
MCHC RBC-ENTMCNC: 28.9 PG — SIGNIFICANT CHANGE UP (ref 27–31)
MCHC RBC-ENTMCNC: 29 PG — SIGNIFICANT CHANGE UP (ref 27–31)
MCHC RBC-ENTMCNC: 33.2 G/DL — SIGNIFICANT CHANGE UP (ref 32–36)
MCHC RBC-ENTMCNC: 33.4 G/DL — SIGNIFICANT CHANGE UP (ref 32–36)
MCV RBC AUTO: 86.5 FL — SIGNIFICANT CHANGE UP (ref 81–99)
MCV RBC AUTO: 87.5 FL — SIGNIFICANT CHANGE UP (ref 81–99)
NITRITE UR-MCNC: NEGATIVE — SIGNIFICANT CHANGE UP
PH UR: 5 — SIGNIFICANT CHANGE UP (ref 5–8)
PLATELET # BLD AUTO: 251 K/UL — SIGNIFICANT CHANGE UP (ref 150–400)
PLATELET # BLD AUTO: 274 K/UL — SIGNIFICANT CHANGE UP (ref 150–400)
POTASSIUM SERPL-MCNC: 4 MMOL/L — SIGNIFICANT CHANGE UP (ref 3.5–5.3)
POTASSIUM SERPL-MCNC: 4.2 MMOL/L — SIGNIFICANT CHANGE UP (ref 3.5–5.3)
POTASSIUM SERPL-SCNC: 4 MMOL/L — SIGNIFICANT CHANGE UP (ref 3.5–5.3)
POTASSIUM SERPL-SCNC: 4.2 MMOL/L — SIGNIFICANT CHANGE UP (ref 3.5–5.3)
PROT SERPL-MCNC: 6.1 G/DL — LOW (ref 6.6–8.7)
PROT UR-MCNC: 30 MG/DL
RBC # BLD: 5.1 M/UL — SIGNIFICANT CHANGE UP (ref 4.4–5.2)
RBC # BLD: 5.12 M/UL — SIGNIFICANT CHANGE UP (ref 4.4–5.2)
RBC # FLD: 14.3 % — SIGNIFICANT CHANGE UP (ref 11–15.6)
RBC # FLD: 14.4 % — SIGNIFICANT CHANGE UP (ref 11–15.6)
RBC CASTS # UR COMP ASSIST: SIGNIFICANT CHANGE UP /HPF (ref 0–4)
SODIUM SERPL-SCNC: 135 MMOL/L — SIGNIFICANT CHANGE UP (ref 135–145)
SODIUM SERPL-SCNC: 137 MMOL/L — SIGNIFICANT CHANGE UP (ref 135–145)
SP GR SPEC: 1.02 — SIGNIFICANT CHANGE UP (ref 1.01–1.02)
TROPONIN T SERPL-MCNC: <0.01 NG/ML — SIGNIFICANT CHANGE UP (ref 0–0.06)
UROBILINOGEN FLD QL: NEGATIVE MG/DL — SIGNIFICANT CHANGE UP
WBC # BLD: 26 K/UL — HIGH (ref 4.8–10.8)
WBC # BLD: 29.2 K/UL — HIGH (ref 4.8–10.8)
WBC # FLD AUTO: 26 K/UL — HIGH (ref 4.8–10.8)
WBC # FLD AUTO: 29.2 K/UL — HIGH (ref 4.8–10.8)
WBC UR QL: >50

## 2018-05-13 PROCEDURE — 99232 SBSQ HOSP IP/OBS MODERATE 35: CPT

## 2018-05-13 PROCEDURE — 71045 X-RAY EXAM CHEST 1 VIEW: CPT | Mod: 26,76

## 2018-05-13 PROCEDURE — 93010 ELECTROCARDIOGRAM REPORT: CPT | Mod: 76

## 2018-05-13 RX ORDER — LANOLIN ALCOHOL/MO/W.PET/CERES
3 CREAM (GRAM) TOPICAL AT BEDTIME
Qty: 0 | Refills: 0 | Status: DISCONTINUED | OUTPATIENT
Start: 2018-05-13 | End: 2018-05-15

## 2018-05-13 RX ORDER — OXYCODONE AND ACETAMINOPHEN 5; 325 MG/1; MG/1
1 TABLET ORAL EVERY 4 HOURS
Qty: 0 | Refills: 0 | Status: DISCONTINUED | OUTPATIENT
Start: 2018-05-13 | End: 2018-05-15

## 2018-05-13 RX ORDER — METOPROLOL TARTRATE 50 MG
5 TABLET ORAL ONCE
Qty: 0 | Refills: 0 | Status: COMPLETED | OUTPATIENT
Start: 2018-05-13 | End: 2018-05-13

## 2018-05-13 RX ORDER — MAGNESIUM SULFATE 500 MG/ML
2 VIAL (ML) INJECTION ONCE
Qty: 0 | Refills: 0 | Status: COMPLETED | OUTPATIENT
Start: 2018-05-13 | End: 2018-05-13

## 2018-05-13 RX ORDER — VALSARTAN 80 MG/1
80 TABLET ORAL DAILY
Qty: 0 | Refills: 0 | Status: DISCONTINUED | OUTPATIENT
Start: 2018-05-13 | End: 2018-05-15

## 2018-05-13 RX ADMIN — Medication 81 MILLIGRAM(S): at 10:36

## 2018-05-13 RX ADMIN — Medication 100 MILLIGRAM(S): at 14:50

## 2018-05-13 RX ADMIN — Medication 100 MILLIGRAM(S): at 22:38

## 2018-05-13 RX ADMIN — FENTANYL CITRATE 30 MILLILITER(S): 50 INJECTION INTRAVENOUS at 07:31

## 2018-05-13 RX ADMIN — OXYCODONE AND ACETAMINOPHEN 1 TABLET(S): 5; 325 TABLET ORAL at 11:04

## 2018-05-13 RX ADMIN — SENNA PLUS 2 TABLET(S): 8.6 TABLET ORAL at 22:38

## 2018-05-13 RX ADMIN — PREGABALIN 500 MICROGRAM(S): 225 CAPSULE ORAL at 10:35

## 2018-05-13 RX ADMIN — Medication 50 MILLIGRAM(S): at 18:12

## 2018-05-13 RX ADMIN — AMLODIPINE BESYLATE 5 MILLIGRAM(S): 2.5 TABLET ORAL at 05:03

## 2018-05-13 RX ADMIN — ENOXAPARIN SODIUM 40 MILLIGRAM(S): 100 INJECTION SUBCUTANEOUS at 22:37

## 2018-05-13 RX ADMIN — Medication 3 MILLIGRAM(S): at 22:38

## 2018-05-13 RX ADMIN — VALSARTAN 160 MILLIGRAM(S): 80 TABLET ORAL at 05:03

## 2018-05-13 RX ADMIN — Medication 100 MILLIGRAM(S): at 05:03

## 2018-05-13 RX ADMIN — Medication 5 MILLIGRAM(S): at 01:13

## 2018-05-13 RX ADMIN — Medication 50 MILLIGRAM(S): at 04:36

## 2018-05-13 RX ADMIN — PANTOPRAZOLE SODIUM 40 MILLIGRAM(S): 20 TABLET, DELAYED RELEASE ORAL at 05:03

## 2018-05-13 RX ADMIN — Medication 50 GRAM(S): at 01:04

## 2018-05-13 RX ADMIN — Medication 10 MILLIEQUIVALENT(S): at 10:36

## 2018-05-13 RX ADMIN — OXYCODONE AND ACETAMINOPHEN 1 TABLET(S): 5; 325 TABLET ORAL at 10:36

## 2018-05-13 NOTE — CHART NOTE - NSCHARTNOTEFT_GEN_A_CORE
Pt with Afib to 140's with RVR /64. Pt examined asymptomatic at this time. Pt denies any chest pain SOB, difficulty breathing, syncope. EKG with confirmation of Afib. Pt given 2 mag and full set labs sent with cardiac enzymes. 5 IV lopressor administered. Will f/u on pt after lopressor administered and f/u labs .

## 2018-05-13 NOTE — PROGRESS NOTE ADULT - SUBJECTIVE AND OBJECTIVE BOX
Anesthesia Postop Note Day 2:  Pt. seen. OOB in chair @ present- appears comfortable. Denied pain, nausea or SOB. Only c/o= "tired" - "didn't sleep well".  No anesthesia-related questions/concerns.      T(C): 36.5 (05-13-18 @ 04:34), Max: 37.2 (05-12-18 @ 20:11)  HR: 130 (05-13-18 @ 09:13) (80 - 131)  BP: 158/78 (05-13-18 @ 09:13) (132/68 - 202/82)  RR: 20 (05-13-18 @ 09:13) (18 - 20)  SpO2: 95% (05-13-18 @ 09:13) (93% - 96%)

## 2018-05-13 NOTE — PROGRESS NOTE ADULT - SUBJECTIVE AND OBJECTIVE BOX
Subjective:  "I really thought I was going home  today, wanted to see my mom"  OOB chair, weepy at times      V/S  T(C): 36.5 (05-13-18 @ 04:34), Max: 37.2 (05-12-18 @ 20:11)  HR: 130 (05-13-18 @ 09:13) (80 - 131)  BP: 158/78 (05-13-18 @ 09:13) (132/68 - 202/82)  RR: 20 (05-13-18 @ 09:13) (18 - 20)  SpO2: 95% (05-13-18 @ 09:13) (93% - 96%)  on 2 lpm  at rest                                              Tele:  Afib  90   -140s    CHEST TUBE:     R  posterior                    OUTPUT:   140  ml        per 24 hours     Drainage:  serosang  AIR LEAKS:  [ ] YES [x ] NO      MEDICATIONS  (STANDING):  ALBUTerol    90 MICROgram(s) HFA Inhaler 2 Puff(s) Inhalation every 6 hours  amLODIPine   Tablet 5 milliGRAM(s) Oral daily  aspirin enteric coated 81 milliGRAM(s) Oral daily  cyanocobalamin 500 MICROGram(s) Oral daily  docusate sodium 100 milliGRAM(s) Oral three times a day  enoxaparin Injectable 40 milliGRAM(s) SubCutaneous daily  metoprolol tartrate 50 milliGRAM(s) Oral two times a day  pantoprazole    Tablet 40 milliGRAM(s) Oral before breakfast  potassium chloride    Tablet ER 10 milliEquivalent(s) Oral daily  senna 2 Tablet(s) Oral at bedtime  valsartan 160 milliGRAM(s) Oral daily      05-13    135  |  98  |  24.0<H>  ----------------------------<  106  4.0   |  24.0  |  0.60    Ca    8.6      13 May 2018 05:24  Mg     2.2     05-13    TPro  6.1<L>  /  Alb  3.4  /  TBili  0.5  /  DBili  x   /  AST  16  /  ALT  9   /  AlkPhos  97  05-13                               14.8   26.0  )-----------( 251      ( 13 May 2018 05:24 )             44.6                     CXR:   Right-sided chest tube again seen, unchanged in position. Minimal air   seen right chest wall along the chest tube insertion site. Multiple EKG   clips and wires overlie the chest.     Status post right lobectomy. Mild volume loss right lung. Mild platelike   atelectatic change at the lung bases. No pneumothorax. No focal lung   consolidation. Costophrenic angles are clear. Heart size and mediastinum   are unremarkable. Calcified uncoiled aorta.        Physical Exam:    Neuro: alert, pleasant, no obvious deficits    Pulm: diminished but clear bilaterally, inspiratory effort fair, demonstrates proper use incentive  spirometer, Supplemental O2 in place    CV:  S1 S2 irreg  irreg  tachycardia    Abd:soft  non tender  +   bowel sounds     Extremities: no  edema    Incision:  R   posterior VATS incision + ecchymosis  Tube insitu> no leak              PAST MEDICAL & SURGICAL HISTORY:  Endometriosis  Hypertension  Fibroid tumor  History of ovarian cyst

## 2018-05-13 NOTE — PROGRESS NOTE ADULT - SUBJECTIVE AND OBJECTIVE BOX
SUBJECTIVE    REVIEW OF SYSTEMS    General: tired because she was up all night    Skin/Breast: No rash  	  ENMT: No visual problems, no sore throat	    Respiratory and Thorax: No cough, No CP, No SOB  	  Cardiovascular: No CP, No palpitations    Gastrointestinal: No Abd pain, No N/V/D    Musculoskeletal: No Joint pain, No back pain	    Neurological: No headache    Psychiatric: No anxiety      OBJECTIVE    Vital Signs Last 24 Hrs  T(C): 36.5 (05-13-18 @ 04:34), Max: 37.2 (05-12-18 @ 20:11)  T(F): 97.7 (05-13-18 @ 04:34), Max: 99 (05-12-18 @ 20:11)  HR: 130 (05-13-18 @ 09:13) (80 - 131)  BP: 158/78 (05-13-18 @ 09:13) (132/68 - 202/82)  BP(mean): --  RR: 20 (05-13-18 @ 09:13) (18 - 20)  SpO2: 95% (05-13-18 @ 09:13) (93% - 96%)    PHYSICAL EXAM:    Constitutional: Not in any distress    Eyes: No conjunctival injection    ENMT: No oral lesions    Neck: No nodes, no adenopathy    Back: Straight, no defects    Respiratory: clear b/l    Cardiovascular: irreg at about 110    Gastrointestinal: soft, NT, ND    Extremities: No edema, no erythema    Neurological: no focal deficit    Skin: No rash      MEDICATIONS  (STANDING):  ALBUTerol    90 MICROgram(s) HFA Inhaler 2 Puff(s) Inhalation every 6 hours  aspirin enteric coated 81 milliGRAM(s) Oral daily  cyanocobalamin 500 MICROGram(s) Oral daily  diltiazem    Tablet 60 milliGRAM(s) Oral three times a day  diltiazem Injectable 10 milliGRAM(s) IV Push once  docusate sodium 100 milliGRAM(s) Oral three times a day  enoxaparin Injectable 40 milliGRAM(s) SubCutaneous daily  melatonin 3 milliGRAM(s) Oral at bedtime  metoprolol tartrate 50 milliGRAM(s) Oral two times a day  pantoprazole    Tablet 40 milliGRAM(s) Oral before breakfast  potassium chloride    Tablet ER 10 milliEquivalent(s) Oral daily  senna 2 Tablet(s) Oral at bedtime  valsartan 80 milliGRAM(s) Oral daily    MEDICATIONS  (PRN):  acetaminophen  IVPB. 1000 milliGRAM(s) IV Intermittent once PRN break through pain  ALBUTerol    0.083% 2.5 milliGRAM(s) Nebulizer every 6 hours PRN Shortness of Breath and/or Wheezing  ipratropium    for Nebulization 500 MICROGram(s) Nebulizer every 6 hours PRN Shortness of Breath and/or Wheezing  ketorolac   Injectable 30 milliGRAM(s) IV Push every 6 hours PRN BREAK THROUGH PAIN  naloxone Injectable 0.1 milliGRAM(s) IV Push every 3 minutes PRN For ANY of the following changes in patient status:  A. RR LESS THAN 10 breaths per minute, B. Oxygen saturation LESS THAN 90%, C. Sedation score of 6  ondansetron Injectable 4 milliGRAM(s) IV Push every 6 hours PRN Nausea  oxyCODONE    5 mG/acetaminophen 325 mG 1 Tablet(s) Oral every 4 hours PRN Moderate Pain (4 - 6)    CARDIAC MARKERS ( 13 May 2018 02:00 )  x     / <0.01 ng/mL / 132 U/L / x     / x                                14.8   26.0  )-----------( 251      ( 13 May 2018 05:24 )             44.6     13 May 2018 05:24    135    |  98     |  24.0   ----------------------------<  106    4.0     |  24.0   |  0.60     Ca    8.6        13 May 2018 05:24  Mg     2.2       13 May 2018 02:00    TPro  6.1    /  Alb  3.4    /  TBili  0.5    /  DBili  x      /  AST  16     /  ALT  9      /  AlkPhos  97     13 May 2018 02:00    Allergies    No Known Allergies    Intolerances

## 2018-05-13 NOTE — CONSULT NOTE ADULT - SUBJECTIVE AND OBJECTIVE BOX
[facility]                                   [address] [address]                                                      PHONE: [phone]                                                         FAX: [phone]  ----------------------------------------------------------------------------------------------------    68y Female with past medical history as under found to have right lung mass. Now s/p VATS (video-assisted thoracoscopic surgery) and Lobectomy of right lung with video-assisted thoracoscopic surgery. Now s/p chest tube removal. Noted to have AF with RVR. Pt reports prior h/o HTN but does not see doctors regularly. She was seen by Dr. Mancini and then saw cardiologist prior to surgery. She reportedly underwent echo as well. At the time of evaluation, pt reports feeling well. She does not report any palpitations/chest pain. No prior cardiac history. She is otherwise active without any significant limitations.     PAST MEDICAL & SURGICAL HISTORY:  Endometriosis  Hypertension  Fibroid tumor  History of ovarian cyst    No Known Allergies    Review of Systems:  Positive for fatigue  Rest of the systems were reviewed and was negative.     Family history:   No pertinent family history in first degree relative of early CAD, sudden cardiac death or  congenital heart disease    Social History:  Former smoking   No alcohol  No other drug use    Vital Signs Last 24 Hrs  T(C): 36.5 (13 May 2018 04:34), Max: 37.2 (12 May 2018 20:11)  T(F): 97.7 (13 May 2018 04:34), Max: 99 (12 May 2018 20:11)  HR: 130 (13 May 2018 09:13) (80 - 131)  BP: 158/78 (13 May 2018 09:13) (132/68 - 202/82)  BP(mean): --  RR: 20 (13 May 2018 09:13) (18 - 20)  SpO2: 95% (13 May 2018 09:13) (93% - 96%)    PHYSICAL EXAM:  Constitutional: Appears well developed, well nourished; alert and co-operative  HEENT:     Head: Normocephalic and atraumatic  Eyes: Conjunctiva normal, No scleral icterus  Mouth/Throat: Mucous membranes are normal. Mucosa are not pale or dry.  Cardiovascular: S1, S2 irregular, tachycardic  Respiratory: Decreased BS on right base.  Gastrointestinal:  Soft, Non-tender, + BS	  Musculoskeletal: Normal range of motion. No edema  Skin: Warm and dry. No cyanosis . No diaphoresis.  Neurologic: Alert oriented to time place and person. Normal strength and no tremor.  Psychiatric: Normal mood and affect, Speech is normal and behavior is normal.        LABS:                        14.8   26.0  )-----------( 251      ( 13 May 2018 05:24 )             44.6     05-13    135  |  98  |  24.0<H>  ----------------------------<  106  4.0   |  24.0  |  0.60    Ca    8.6      13 May 2018 05:24  Mg     2.2     05-13    TPro  6.1<L>  /  Alb  3.4  /  TBili  0.5  /  DBili  x   /  AST  16  /  ALT  9   /  AlkPhos  97  05-13    CARDIAC MARKERS ( 13 May 2018 02:00 )  x     / <0.01 ng/mL / 132 U/L / x     / x        RADIOLOGY & ADDITIONAL STUDIES:    CARDIOLOGY TESTING:     ECG: AF with RVR    MEDICATIONS:  MEDICATIONS  (STANDING):  ALBUTerol    90 MICROgram(s) HFA Inhaler 2 Puff(s) Inhalation every 6 hours  amLODIPine   Tablet 5 milliGRAM(s) Oral daily  aspirin enteric coated 81 milliGRAM(s) Oral daily  cyanocobalamin 500 MICROGram(s) Oral daily  docusate sodium 100 milliGRAM(s) Oral three times a day  enoxaparin Injectable 40 milliGRAM(s) SubCutaneous daily  melatonin 3 milliGRAM(s) Oral at bedtime  metoprolol tartrate 50 milliGRAM(s) Oral two times a day  pantoprazole    Tablet 40 milliGRAM(s) Oral before breakfast  potassium chloride    Tablet ER 10 milliEquivalent(s) Oral daily  senna 2 Tablet(s) Oral at bedtime  valsartan 160 milliGRAM(s) Oral daily    MEDICATIONS  (PRN):  acetaminophen  IVPB. 1000 milliGRAM(s) IV Intermittent once PRN break through pain  ALBUTerol    0.083% 2.5 milliGRAM(s) Nebulizer every 6 hours PRN Shortness of Breath and/or Wheezing  ipratropium    for Nebulization 500 MICROGram(s) Nebulizer every 6 hours PRN Shortness of Breath and/or Wheezing  ketorolac   Injectable 30 milliGRAM(s) IV Push every 6 hours PRN BREAK THROUGH PAIN  naloxone Injectable 0.1 milliGRAM(s) IV Push every 3 minutes PRN For ANY of the following changes in patient status:  A. RR LESS THAN 10 breaths per minute, B. Oxygen saturation LESS THAN 90%, C. Sedation score of 6  ondansetron Injectable 4 milliGRAM(s) IV Push every 6 hours PRN Nausea  oxyCODONE    5 mG/acetaminophen 325 mG 1 Tablet(s) Oral every 4 hours PRN Moderate Pain (4 - 6)      ASSESSMENT AND PLAN:    68y Female with prior history of HTN found to have right lung mass. Now s/p VATS (video-assisted thoracoscopic surgery) and Lobectomy of right lung with video-assisted thoracoscopic surgery. Now s/p chest tube removal. Noted to have AF with RVR.    -  Will give 1 dose of iv cardizem and then cardizem po  -  Full dose anticoagulation once OK with surgery  -  Echo to asses LV function   -  D/c amlodipine and will decrease valsartan

## 2018-05-14 DIAGNOSIS — I10 ESSENTIAL (PRIMARY) HYPERTENSION: ICD-10-CM

## 2018-05-14 DIAGNOSIS — F41.9 ANXIETY DISORDER, UNSPECIFIED: ICD-10-CM

## 2018-05-14 LAB
ALBUMIN SERPL ELPH-MCNC: 3 G/DL — LOW (ref 3.3–5.2)
ALP SERPL-CCNC: 65 U/L — SIGNIFICANT CHANGE UP (ref 40–120)
ALT FLD-CCNC: 8 U/L — SIGNIFICANT CHANGE UP
ANION GAP SERPL CALC-SCNC: 11 MMOL/L — SIGNIFICANT CHANGE UP (ref 5–17)
AST SERPL-CCNC: 11 U/L — SIGNIFICANT CHANGE UP
BILIRUB SERPL-MCNC: 0.7 MG/DL — SIGNIFICANT CHANGE UP (ref 0.4–2)
BUN SERPL-MCNC: 21 MG/DL — HIGH (ref 8–20)
CALCIUM SERPL-MCNC: 8.4 MG/DL — LOW (ref 8.6–10.2)
CHLORIDE SERPL-SCNC: 98 MMOL/L — SIGNIFICANT CHANGE UP (ref 98–107)
CO2 SERPL-SCNC: 28 MMOL/L — SIGNIFICANT CHANGE UP (ref 22–29)
CREAT SERPL-MCNC: 0.66 MG/DL — SIGNIFICANT CHANGE UP (ref 0.5–1.3)
CULTURE RESULTS: SIGNIFICANT CHANGE UP
GLUCOSE SERPL-MCNC: 85 MG/DL — SIGNIFICANT CHANGE UP (ref 70–115)
HCT VFR BLD CALC: 40.4 % — SIGNIFICANT CHANGE UP (ref 37–47)
HGB BLD-MCNC: 13.4 G/DL — SIGNIFICANT CHANGE UP (ref 12–16)
MCHC RBC-ENTMCNC: 28.8 PG — SIGNIFICANT CHANGE UP (ref 27–31)
MCHC RBC-ENTMCNC: 33.2 G/DL — SIGNIFICANT CHANGE UP (ref 32–36)
MCV RBC AUTO: 86.9 FL — SIGNIFICANT CHANGE UP (ref 81–99)
PLATELET # BLD AUTO: 332 K/UL — SIGNIFICANT CHANGE UP (ref 150–400)
POTASSIUM SERPL-MCNC: 4 MMOL/L — SIGNIFICANT CHANGE UP (ref 3.5–5.3)
POTASSIUM SERPL-SCNC: 4 MMOL/L — SIGNIFICANT CHANGE UP (ref 3.5–5.3)
PROT SERPL-MCNC: 5.9 G/DL — LOW (ref 6.6–8.7)
RBC # BLD: 4.65 M/UL — SIGNIFICANT CHANGE UP (ref 4.4–5.2)
RBC # FLD: 14.2 % — SIGNIFICANT CHANGE UP (ref 11–15.6)
SODIUM SERPL-SCNC: 137 MMOL/L — SIGNIFICANT CHANGE UP (ref 135–145)
SPECIMEN SOURCE: SIGNIFICANT CHANGE UP
WBC # BLD: 22.8 K/UL — HIGH (ref 4.8–10.8)
WBC # FLD AUTO: 22.8 K/UL — HIGH (ref 4.8–10.8)

## 2018-05-14 PROCEDURE — 71045 X-RAY EXAM CHEST 1 VIEW: CPT | Mod: 26

## 2018-05-14 RX ORDER — APIXABAN 2.5 MG/1
5 TABLET, FILM COATED ORAL EVERY 12 HOURS
Qty: 0 | Refills: 0 | Status: DISCONTINUED | OUTPATIENT
Start: 2018-05-14 | End: 2018-05-15

## 2018-05-14 RX ADMIN — PANTOPRAZOLE SODIUM 40 MILLIGRAM(S): 20 TABLET, DELAYED RELEASE ORAL at 05:12

## 2018-05-14 RX ADMIN — Medication 3 MILLIGRAM(S): at 21:14

## 2018-05-14 RX ADMIN — Medication 100 MILLIGRAM(S): at 13:30

## 2018-05-14 RX ADMIN — PREGABALIN 500 MICROGRAM(S): 225 CAPSULE ORAL at 13:28

## 2018-05-14 RX ADMIN — Medication 100 MILLIGRAM(S): at 21:14

## 2018-05-14 RX ADMIN — OXYCODONE AND ACETAMINOPHEN 1 TABLET(S): 5; 325 TABLET ORAL at 02:00

## 2018-05-14 RX ADMIN — OXYCODONE AND ACETAMINOPHEN 1 TABLET(S): 5; 325 TABLET ORAL at 01:12

## 2018-05-14 RX ADMIN — SENNA PLUS 2 TABLET(S): 8.6 TABLET ORAL at 21:14

## 2018-05-14 RX ADMIN — ALBUTEROL 2 PUFF(S): 90 AEROSOL, METERED ORAL at 15:50

## 2018-05-14 RX ADMIN — Medication 81 MILLIGRAM(S): at 11:33

## 2018-05-14 RX ADMIN — ALBUTEROL 2 PUFF(S): 90 AEROSOL, METERED ORAL at 21:08

## 2018-05-14 RX ADMIN — Medication 50 MILLIGRAM(S): at 05:13

## 2018-05-14 RX ADMIN — Medication 10 MILLIEQUIVALENT(S): at 11:33

## 2018-05-14 RX ADMIN — Medication 100 MILLIGRAM(S): at 05:12

## 2018-05-14 RX ADMIN — ALBUTEROL 2 PUFF(S): 90 AEROSOL, METERED ORAL at 09:53

## 2018-05-14 RX ADMIN — Medication 50 MILLIGRAM(S): at 17:55

## 2018-05-14 RX ADMIN — APIXABAN 5 MILLIGRAM(S): 2.5 TABLET, FILM COATED ORAL at 22:00

## 2018-05-14 RX ADMIN — VALSARTAN 80 MILLIGRAM(S): 80 TABLET ORAL at 05:13

## 2018-05-14 NOTE — PROGRESS NOTE ADULT - SUBJECTIVE AND OBJECTIVE BOX
Subjective: asking to go home      T(C): 35.3 (05-14-18 @ 09:19), Max: 36.9 (05-13-18 @ 22:29)  HR: 99 (05-14-18 @ 09:19) (99 - 122)  BP: 145/60 (05-14-18 @ 09:19) (138/75 - 160/75)  ABP: --  ABP(mean): --  RR: 17 (05-14-18 @ 09:19) (17 - 19)  SpO2: 96% (05-14-18 @ 09:19) (94% - 96%)    05-14    137  |  98  |  21.0<H>  ----------------------------<  85  4.0   |  28.0  |  0.66    Ca    8.4<L>      14 May 2018 05:51  Mg     2.2     05-13    TPro  5.9<L>  /  Alb  3.0<L>  /  TBili  0.7  /  DBili  x   /  AST  11  /  ALT  8   /  AlkPhos  65  05-14                               13.4   22.8  )-----------( 332      ( 14 May 2018 05:51 )             40.4         CXR:    I&O's Detail    13 May 2018 07:01  -  14 May 2018 07:00  --------------------------------------------------------  IN:    Oral Fluid: 580 mL  Total IN: 580 mL    OUT:    Chest Tube: 30 mL    Voided: 600 mL  Total OUT: 630 mL    Total NET: -50 mL    Drug Dosing Weight  Height (cm): 160.02 (11 May 2018 08:10)  Weight (kg): 74.4 (11 May 2018 08:10)  BMI (kg/m2): 29.1 (11 May 2018 08:10)  BSA (m2): 1.78 (11 May 2018 08:10)    MEDICATIONS  (STANDING):  ALBUTerol    90 MICROgram(s) HFA Inhaler 2 Puff(s) Inhalation every 6 hours  aspirin enteric coated 81 milliGRAM(s) Oral daily  cyanocobalamin 500 MICROGram(s) Oral daily  diltiazem    Tablet 90 milliGRAM(s) Oral four times a day  diltiazem Injectable 10 milliGRAM(s) IV Push once  docusate sodium 100 milliGRAM(s) Oral three times a day  enoxaparin Injectable 40 milliGRAM(s) SubCutaneous daily  melatonin 3 milliGRAM(s) Oral at bedtime  metoprolol tartrate 50 milliGRAM(s) Oral two times a day  pantoprazole    Tablet 40 milliGRAM(s) Oral before breakfast  potassium chloride    Tablet ER 10 milliEquivalent(s) Oral daily  senna 2 Tablet(s) Oral at bedtime  valsartan 80 milliGRAM(s) Oral daily    MEDICATIONS  (PRN):  acetaminophen  IVPB. 1000 milliGRAM(s) IV Intermittent once PRN break through pain  ALBUTerol    0.083% 2.5 milliGRAM(s) Nebulizer every 6 hours PRN Shortness of Breath and/or Wheezing  ipratropium    for Nebulization 500 MICROGram(s) Nebulizer every 6 hours PRN Shortness of Breath and/or Wheezing  ketorolac   Injectable 30 milliGRAM(s) IV Push every 6 hours PRN BREAK THROUGH PAIN  naloxone Injectable 0.1 milliGRAM(s) IV Push every 3 minutes PRN For ANY of the following changes in patient status:  A. RR LESS THAN 10 breaths per minute, B. Oxygen saturation LESS THAN 90%, C. 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, states she feels fine and doesnt understand why she needs to stay in the hospital  denies CP, SOB, palpitations, HA, fever, chills, cough, dizziness, numbness/tingling, N/V, abd pain      T(C): 35.3 (05-14-18 @ 09:19), Max: 36.9 (05-13-18 @ 22:29)  HR: 99 (05-14-18 @ 09:19) (99 - 122)  BP: 145/60 (05-14-18 @ 09:19) (138/75 - 160/75)  ABP: --  ABP(mean): --  RR: 17 (05-14-18 @ 09:19) (17 - 19)  SpO2: 96% (05-14-18 @ 09:19) (94% - 96%)    05-14    137  |  98  |  21.0<H>  ----------------------------<  85  4.0   |  28.0  |  0.66    Ca    8.4<L>      14 May 2018 05:51  Mg     2.2     05-13    TPro  5.9<L>  /  Alb  3.0<L>  /  TBili  0.7  /  DBili  x   /  AST  11  /  ALT  8   /  AlkPhos  65  05-14                               13.4   22.8  )-----------( 332      ( 14 May 2018 05:51 )             40.4         CXR:    I&O's Detail    13 May 2018 07:01  -  14 May 2018 07:00  --------------------------------------------------------  IN:    Oral Fluid: 580 mL  Total IN: 580 mL    OUT:    Chest Tube: 30 mL    Voided: 600 mL  Total OUT: 630 mL    Total NET: -50 mL    Drug Dosing Weight  Height (cm): 160.02 (11 May 2018 08:10)  Weight (kg): 74.4 (11 May 2018 08:10)  BMI (kg/m2): 29.1 (11 May 2018 08:10)  BSA (m2): 1.78 (11 May 2018 08:10)    MEDICATIONS  (STANDING):  ALBUTerol    90 MICROgram(s) HFA Inhaler 2 Puff(s) Inhalation every 6 hours  aspirin enteric coated 81 milliGRAM(s) Oral daily  cyanocobalamin 500 MICROGram(s) Oral daily  diltiazem    Tablet 90 milliGRAM(s) Oral four times a day  diltiazem Injectable 10 milliGRAM(s) IV Push once  docusate sodium 100 milliGRAM(s) Oral three times a day  enoxaparin Injectable 40 milliGRAM(s) SubCutaneous daily  melatonin 3 milliGRAM(s) Oral at bedtime  metoprolol tartrate 50 milliGRAM(s) Oral two times a day  pantoprazole    Tablet 40 milliGRAM(s) Oral before breakfast  potassium chloride    Tablet ER 10 milliEquivalent(s) Oral daily  senna 2 Tablet(s) Oral at bedtime  valsartan 80 milliGRAM(s) Oral daily    MEDICATIONS  (PRN):  acetaminophen  IVPB. 1000 milliGRAM(s) IV Intermittent once PRN break through pain  ALBUTerol    0.083% 2.5 milliGRAM(s) Nebulizer every 6 hours PRN Shortness of Breath and/or Wheezing  ipratropium    for Nebulization 500 MICROGram(s) Nebulizer every 6 hours PRN Shortness of Breath and/or Wheezing  ketorolac   Injectable 30 milliGRAM(s) IV Push every 6 hours PRN BREAK THROUGH PAIN  naloxone Injectable 0.1 milliGRAM(s) IV Push every 3 minutes PRN For ANY of the following changes in patient status:  A. RR LESS THAN 10 breaths per minute, B. Oxygen saturation LESS THAN 90%, C. Sedation score of 6  ondansetron Injectable 4 milliGRAM(s) IV Push every 6 hours PRN Nausea  oxyCODONE  5 mG/acetaminophen 325 mG 1 Tablet(s) Oral every 4 hours PRN Moderate Pain (4 - 6)    Physical Exam  Neuro: A&Ox3, non focal, anxious, crying  Pulm: decreased right base, clear on left  CV: S1S2 irregurlarly irregular  Abd: + BS soft NT ND  Extrem/MS: no edema, WWP  Incision(s): R chest inc C/D/I, no erythema,  dsg over CT site intact

## 2018-05-14 NOTE — PROGRESS NOTE ADULT - SUBJECTIVE AND OBJECTIVE BOX
SUBJECTIVE    REVIEW OF SYSTEMS    General: Not in any pain	    Skin/Breast: No rash  	  ENMT: No visual problems, no sore throat	    Respiratory and Thorax: No cough, No CP, No SOB  	  Cardiovascular: No CP, No palpitations    Gastrointestinal: No Abd pain, No N/V/D    Musculoskeletal: No Joint pain, No back pain	    Neurological: No headache    Psychiatric: No anxiety      OBJECTIVE    Vital Signs Last 24 Hrs  T(C): 36.7 (05-14-18 @ 15:20), Max: 36.9 (05-13-18 @ 22:29)  T(F): 98.1 (05-14-18 @ 15:20), Max: 98.5 (05-13-18 @ 22:29)  HR: 93 (05-14-18 @ 21:10) (93 - 121)  BP: 138/70 (05-14-18 @ 18:30) (138/70 - 164/85)  BP(mean): --  RR: 18 (05-14-18 @ 15:20) (17 - 18)  SpO2: 96% (05-14-18 @ 09:19) (95% - 96%)    PHYSICAL EXAM:    Constitutional: Not in any distress    Eyes: No conjunctival injection    ENMT: No oral lesions    Neck: No nodes, no adenopathy    Back: Straight, no defects    Respiratory: clear b/l    Cardiovascular: RRR, no murmur    Gastrointestinal: soft, NT, ND    Extremities: No edema, no erythema    Neurological: no focal deficit    Skin: No rash      MEDICATIONS  (STANDING):  ALBUTerol    90 MICROgram(s) HFA Inhaler 2 Puff(s) Inhalation every 6 hours  apixaban 5 milliGRAM(s) Oral every 12 hours  aspirin enteric coated 81 milliGRAM(s) Oral daily  cyanocobalamin 500 MICROGram(s) Oral daily  diltiazem    Tablet 90 milliGRAM(s) Oral four times a day  diltiazem Injectable 10 milliGRAM(s) IV Push once  docusate sodium 100 milliGRAM(s) Oral three times a day  melatonin 3 milliGRAM(s) Oral at bedtime  metoprolol tartrate 50 milliGRAM(s) Oral two times a day  pantoprazole    Tablet 40 milliGRAM(s) Oral before breakfast  potassium chloride    Tablet ER 10 milliEquivalent(s) Oral daily  senna 2 Tablet(s) Oral at bedtime  valsartan 80 milliGRAM(s) Oral daily    MEDICATIONS  (PRN):  acetaminophen  IVPB. 1000 milliGRAM(s) IV Intermittent once PRN break through pain  ALBUTerol    0.083% 2.5 milliGRAM(s) Nebulizer every 6 hours PRN Shortness of Breath and/or Wheezing  ipratropium    for Nebulization 500 MICROGram(s) Nebulizer every 6 hours PRN Shortness of Breath and/or Wheezing  ketorolac   Injectable 30 milliGRAM(s) IV Push every 6 hours PRN BREAK THROUGH PAIN  naloxone Injectable 0.1 milliGRAM(s) IV Push every 3 minutes PRN For ANY of the following changes in patient status:  A. RR LESS THAN 10 breaths per minute, B. Oxygen saturation LESS THAN 90%, C. Sedation score of 6  ondansetron Injectable 4 milliGRAM(s) IV Push every 6 hours PRN Nausea  oxyCODONE    5 mG/acetaminophen 325 mG 1 Tablet(s) Oral every 4 hours PRN Moderate Pain (4 - 6)    CARDIAC MARKERS ( 13 May 2018 02:00 )  x     / <0.01 ng/mL / 132 U/L / x     / x                                13.4   22.8  )-----------( 332      ( 14 May 2018 05:51 )             40.4     14 May 2018 05:51    137    |  98     |  21.0   ----------------------------<  85     4.0     |  28.0   |  0.66     Ca    8.4        14 May 2018 05:51  Mg     2.2       13 May 2018 02:00    TPro  5.9    /  Alb  3.0    /  TBili  0.7    /  DBili  x      /  AST  11     /  ALT  8      /  AlkPhos  65     14 May 2018 05:51    Allergies    No Known Allergies    Intolerances

## 2018-05-15 ENCOUNTER — TRANSCRIPTION ENCOUNTER (OUTPATIENT)
Age: 68
End: 2018-05-15

## 2018-05-15 VITALS — HEART RATE: 90 BPM | DIASTOLIC BLOOD PRESSURE: 70 MMHG | SYSTOLIC BLOOD PRESSURE: 155 MMHG

## 2018-05-15 DIAGNOSIS — Z29.9 ENCOUNTER FOR PROPHYLACTIC MEASURES, UNSPECIFIED: ICD-10-CM

## 2018-05-15 LAB
ANION GAP SERPL CALC-SCNC: 14 MMOL/L — SIGNIFICANT CHANGE UP (ref 5–17)
BUN SERPL-MCNC: 20 MG/DL — SIGNIFICANT CHANGE UP (ref 8–20)
CALCIUM SERPL-MCNC: 8.6 MG/DL — SIGNIFICANT CHANGE UP (ref 8.6–10.2)
CHLORIDE SERPL-SCNC: 98 MMOL/L — SIGNIFICANT CHANGE UP (ref 98–107)
CO2 SERPL-SCNC: 26 MMOL/L — SIGNIFICANT CHANGE UP (ref 22–29)
CREAT SERPL-MCNC: 0.65 MG/DL — SIGNIFICANT CHANGE UP (ref 0.5–1.3)
GLUCOSE SERPL-MCNC: 105 MG/DL — SIGNIFICANT CHANGE UP (ref 70–115)
HCT VFR BLD CALC: 40.6 % — SIGNIFICANT CHANGE UP (ref 37–47)
HGB BLD-MCNC: 13.5 G/DL — SIGNIFICANT CHANGE UP (ref 12–16)
MAGNESIUM SERPL-MCNC: 2.1 MG/DL — SIGNIFICANT CHANGE UP (ref 1.8–2.6)
MCHC RBC-ENTMCNC: 28.7 PG — SIGNIFICANT CHANGE UP (ref 27–31)
MCHC RBC-ENTMCNC: 33.3 G/DL — SIGNIFICANT CHANGE UP (ref 32–36)
MCV RBC AUTO: 86.4 FL — SIGNIFICANT CHANGE UP (ref 81–99)
PLATELET # BLD AUTO: 312 K/UL — SIGNIFICANT CHANGE UP (ref 150–400)
POTASSIUM SERPL-MCNC: 4.3 MMOL/L — SIGNIFICANT CHANGE UP (ref 3.5–5.3)
POTASSIUM SERPL-SCNC: 4.3 MMOL/L — SIGNIFICANT CHANGE UP (ref 3.5–5.3)
RBC # BLD: 4.7 M/UL — SIGNIFICANT CHANGE UP (ref 4.4–5.2)
RBC # FLD: 13.8 % — SIGNIFICANT CHANGE UP (ref 11–15.6)
SODIUM SERPL-SCNC: 138 MMOL/L — SIGNIFICANT CHANGE UP (ref 135–145)
WBC # BLD: 19.2 K/UL — HIGH (ref 4.8–10.8)
WBC # FLD AUTO: 19.2 K/UL — HIGH (ref 4.8–10.8)

## 2018-05-15 PROCEDURE — 81001 URINALYSIS AUTO W/SCOPE: CPT

## 2018-05-15 PROCEDURE — 84132 ASSAY OF SERUM POTASSIUM: CPT

## 2018-05-15 PROCEDURE — 93306 TTE W/DOPPLER COMPLETE: CPT

## 2018-05-15 PROCEDURE — 85027 COMPLETE CBC AUTOMATED: CPT

## 2018-05-15 PROCEDURE — 80048 BASIC METABOLIC PNL TOTAL CA: CPT

## 2018-05-15 PROCEDURE — 93005 ELECTROCARDIOGRAM TRACING: CPT

## 2018-05-15 PROCEDURE — 82550 ASSAY OF CK (CPK): CPT

## 2018-05-15 PROCEDURE — 71045 X-RAY EXAM CHEST 1 VIEW: CPT | Mod: 26

## 2018-05-15 PROCEDURE — 88341 IMHCHEM/IMCYTCHM EA ADD ANTB: CPT

## 2018-05-15 PROCEDURE — 84484 ASSAY OF TROPONIN QUANT: CPT

## 2018-05-15 PROCEDURE — 94640 AIRWAY INHALATION TREATMENT: CPT

## 2018-05-15 PROCEDURE — 88307 TISSUE EXAM BY PATHOLOGIST: CPT

## 2018-05-15 PROCEDURE — 71045 X-RAY EXAM CHEST 1 VIEW: CPT

## 2018-05-15 PROCEDURE — 83735 ASSAY OF MAGNESIUM: CPT

## 2018-05-15 PROCEDURE — 80053 COMPREHEN METABOLIC PANEL: CPT

## 2018-05-15 PROCEDURE — 87086 URINE CULTURE/COLONY COUNT: CPT

## 2018-05-15 PROCEDURE — 36415 COLL VENOUS BLD VENIPUNCTURE: CPT

## 2018-05-15 PROCEDURE — 99239 HOSP IP/OBS DSCHRG MGMT >30: CPT

## 2018-05-15 PROCEDURE — 94760 N-INVAS EAR/PLS OXIMETRY 1: CPT

## 2018-05-15 PROCEDURE — 88342 IMHCHEM/IMCYTCHM 1ST ANTB: CPT

## 2018-05-15 PROCEDURE — 88309 TISSUE EXAM BY PATHOLOGIST: CPT

## 2018-05-15 RX ORDER — DOCUSATE SODIUM 100 MG
1 CAPSULE ORAL
Qty: 0 | Refills: 0 | DISCHARGE
Start: 2018-05-15

## 2018-05-15 RX ORDER — POTASSIUM CHLORIDE 20 MEQ
0 PACKET (EA) ORAL
Qty: 60 | Refills: 0 | COMMUNITY

## 2018-05-15 RX ORDER — APIXABAN 2.5 MG/1
1 TABLET, FILM COATED ORAL
Qty: 60 | Refills: 0 | OUTPATIENT
Start: 2018-05-15 | End: 2018-06-13

## 2018-05-15 RX ORDER — PANTOPRAZOLE SODIUM 20 MG/1
1 TABLET, DELAYED RELEASE ORAL
Qty: 14 | Refills: 0 | OUTPATIENT
Start: 2018-05-15 | End: 2018-05-28

## 2018-05-15 RX ADMIN — Medication 100 MILLIGRAM(S): at 05:25

## 2018-05-15 RX ADMIN — APIXABAN 5 MILLIGRAM(S): 2.5 TABLET, FILM COATED ORAL at 09:11

## 2018-05-15 RX ADMIN — PANTOPRAZOLE SODIUM 40 MILLIGRAM(S): 20 TABLET, DELAYED RELEASE ORAL at 05:25

## 2018-05-15 RX ADMIN — VALSARTAN 80 MILLIGRAM(S): 80 TABLET ORAL at 05:25

## 2018-05-15 RX ADMIN — Medication 50 MILLIGRAM(S): at 05:25

## 2018-05-15 RX ADMIN — ALBUTEROL 2 PUFF(S): 90 AEROSOL, METERED ORAL at 03:30

## 2018-05-15 RX ADMIN — ALBUTEROL 2 PUFF(S): 90 AEROSOL, METERED ORAL at 09:00

## 2018-05-15 NOTE — DISCHARGE NOTE ADULT - MEDICATION SUMMARY - MEDICATIONS TO STOP TAKING
I will STOP taking the medications listed below when I get home from the hospital:    POTASSIUM CL ER 10 MEQ CAPSULE    PREDNISONE 10 MG TABLET

## 2018-05-15 NOTE — DISCHARGE NOTE ADULT - PATIENT PORTAL LINK FT
You can access the [facility], offered by [facility], by registering with the following website: [email or web address]

## 2018-05-15 NOTE — DISCHARGE NOTE ADULT - CARE PLAN
Principal Discharge DX:	Lung mass  Goal:	recover from surgery  Assessment and plan of treatment:	Follow with Dr. Lopez on 5/21 at 2:30pm.  Arrange a follow up with Dr. Mancini & your cardiologist.  Secondary Diagnosis:	New onset atrial fibrillation  Assessment and plan of treatment:	Continue Eliquis BID  Secondary Diagnosis:	Essential hypertension  Assessment and plan of treatment:	Continue Lopressor

## 2018-05-15 NOTE — PROGRESS NOTE ADULT - SUBJECTIVE AND OBJECTIVE BOX
SUBJECTIVE    REVIEW OF SYSTEMS    General: Not in any pain	    Skin/Breast: No rash  	  ENMT: No visual problems, no sore throat	    Respiratory and Thorax: No cough, No CP, No SOB  	  Cardiovascular: No CP, No palpitations    Gastrointestinal: No Abd pain, No N/V/D    Musculoskeletal: No Joint pain, No back pain	    Neurological: No headache    Psychiatric: No anxiety      OBJECTIVE    Vital Signs Last 24 Hrs  T(C): 37.1 (05-15-18 @ 05:23), Max: 37.1 (05-15-18 @ 05:23)  T(F): 98.8 (05-15-18 @ 05:23), Max: 98.8 (05-15-18 @ 05:23)  HR: 88 (05-15-18 @ 09:00) (88 - 121)  BP: 175/85 (05-15-18 @ 05:23) (138/70 - 175/85)  BP(mean): --  RR: 18 (05-15-18 @ 05:23) (18 - 18)  SpO2: 97% (05-15-18 @ 09:00) (97% - 99%)    PHYSICAL EXAM:    Constitutional: Not in any distress    Eyes: No conjunctival injection    ENMT: No oral lesions    Neck: No nodes, no adenopathy    Back: Straight, no defects    Respiratory: clear b/l    Cardiovascular: RRR, no murmur    Gastrointestinal: soft, NT, ND    Extremities: No edema, no erythema    Neurological: no focal deficit    Skin: No rash      MEDICATIONS  (STANDING):  ALBUTerol    90 MICROgram(s) HFA Inhaler 2 Puff(s) Inhalation every 6 hours  apixaban 5 milliGRAM(s) Oral every 12 hours  aspirin enteric coated 81 milliGRAM(s) Oral daily  cyanocobalamin 500 MICROGram(s) Oral daily  diltiazem    Tablet 90 milliGRAM(s) Oral four times a day  diltiazem Injectable 10 milliGRAM(s) IV Push once  docusate sodium 100 milliGRAM(s) Oral three times a day  melatonin 3 milliGRAM(s) Oral at bedtime  metoprolol tartrate 50 milliGRAM(s) Oral two times a day  pantoprazole    Tablet 40 milliGRAM(s) Oral before breakfast  potassium chloride    Tablet ER 10 milliEquivalent(s) Oral daily  senna 2 Tablet(s) Oral at bedtime  valsartan 80 milliGRAM(s) Oral daily    MEDICATIONS  (PRN):  acetaminophen  IVPB. 1000 milliGRAM(s) IV Intermittent once PRN break through pain  ALBUTerol    0.083% 2.5 milliGRAM(s) Nebulizer every 6 hours PRN Shortness of Breath and/or Wheezing  ipratropium    for Nebulization 500 MICROGram(s) Nebulizer every 6 hours PRN Shortness of Breath and/or Wheezing  ketorolac   Injectable 30 milliGRAM(s) IV Push every 6 hours PRN BREAK THROUGH PAIN  naloxone Injectable 0.1 milliGRAM(s) IV Push every 3 minutes PRN For ANY of the following changes in patient status:  A. RR LESS THAN 10 breaths per minute, B. Oxygen saturation LESS THAN 90%, C. Sedation score of 6  ondansetron Injectable 4 milliGRAM(s) IV Push every 6 hours PRN Nausea  oxyCODONE    5 mG/acetaminophen 325 mG 1 Tablet(s) Oral every 4 hours PRN Moderate Pain (4 - 6)                              13.5   19.2  )-----------( 312      ( 15 May 2018 05:10 )             40.6     15 May 2018 05:10    138    |  98     |  20.0   ----------------------------<  105    4.3     |  26.0   |  0.65     Ca    8.6        15 May 2018 05:10  Mg     2.1       15 May 2018 05:10    TPro  5.9    /  Alb  3.0    /  TBili  0.7    /  DBili  x      /  AST  11     /  ALT  8      /  AlkPhos  65     14 May 2018 05:51    Allergies    No Known Allergies    Intolerances

## 2018-05-15 NOTE — PROGRESS NOTE ADULT - PROBLEM SELECTOR PLAN 3
cont norvasc at home
cont nebs; encourage incentive spirometry
day 2 s/p resection
on cardizem 90 PO QID, metoprolol, valsartan; monitor vitals
cont valsartan
Continue Lopressor & herbertvasc as per Dr. Mancini.

## 2018-05-15 NOTE — DISCHARGE NOTE ADULT - MEDICATION SUMMARY - MEDICATIONS TO TAKE
I will START or STAY ON the medications listed below when I get home from the hospital:    viviscal  -- 1 tab(s) by mouth 2 times a day  -- Indication: For vitamin    aspirin 81 mg oral tablet  -- 1 tab(s) by mouth once a day  -- Indication: For Antiplatelet    oxyCODONE-acetaminophen 5 mg-325 mg oral tablet  -- 1 tab(s) by mouth every 6 hours, As Needed -Moderate Pain (4 - 6) MDD:4  -- Indication: For Pain managment    valsartan 160 mg oral capsule  -- orally once a day  -- Indication: For Hypertension    Eliquis 5 mg oral tablet  -- 1 tab(s) by mouth 2 times a day   -- Check with your doctor before becoming pregnant.  It is very important that you take or use this exactly as directed.  Do not skip doses or discontinue unless directed by your doctor.  Obtain medical advice before taking any non-prescription drugs as some may affect the action of this medication.    -- Indication: For A-fib    ALPRAZOLAM 0.5 MG TABLET  -- Indication: For Anxiety    METOPROLOL TARTRATE 50 MG TAB  -- Indication: For Hypertension    amLODIPine 5 mg oral tablet  -- 1 tab(s) by mouth once a day  -- Indication: For Hypertension    docusate sodium 100 mg oral capsule  -- 1 cap(s) by mouth 3 times a day  -- Indication: For Laxative    pantoprazole 40 mg oral delayed release tablet  -- 1 tab(s) by mouth once a day (before a meal)  -- Indication: For GERD    Vitamin B12 500 mcg oral tablet  -- 1 tab(s) by mouth once a day  -- Indication: For vitamin    Vitamin D3 1000 intl units oral capsule  -- 1 cap(s) by mouth once a day  -- Indication: For vitamin

## 2018-05-15 NOTE — DISCHARGE NOTE ADULT - HOSPITAL COURSE
68F. PMHx HTN, endometriosis, fibroid tumor, ovarian cyst, marijuana smoker, with suspicious lung nodule on routine CXR, PET scan concerning, now s/p VATS Right lower lobectomy 5/11 with Dr. Lopez, postop course complicated by AF, uptitrating cardizem;

## 2018-05-15 NOTE — PROGRESS NOTE ADULT - PROBLEM SELECTOR PLAN 2
ignacio Reyna at home; would treat RVR with metoprolol 50 BID only and close f/u with me and home cardiologist after d/c
cont nebs
day 1 s/p resection; chest tube still draining
will start Eliquis tonight, lovenox d/c
cardiology consult  EKG
cardiology following  cardizem started yesterday, increased today 90 q 6 by Dr. Whitmore  TTE done today, awaiting official read  will dana need AC  ok with Dr. Loepz from thoracic surgery standpoint  Dr. Whitmore to talk to patient about anticoagulation possibilities
Remains in Afib well controlled.  Continue Eliquis BID  Continue Lopressor & ASA.  As per Dr. Mancini, pt. to be discharged on Lopressor & Norvasc, pt. to follow up with cardiology & Dr. Mancini.

## 2018-05-15 NOTE — PROGRESS NOTE ADULT - PROVIDER SPECIALTY LIST ADULT
Anesthesia
Cardiology
Cardiology
Family Medicine
Thoracic Surgery
CT Surgery
Family Medicine

## 2018-05-15 NOTE — PROGRESS NOTE ADULT - SUBJECTIVE AND OBJECTIVE BOX
[facility]                                   [address] [place] [address]                                                      PHONE: [phone]                                                         FAX: [phone]  [email or web address]  ---------------------------------------------------------------------------------------------------------------------------------    FU for AFib rate better control  Pt seen and examined.     Overnight events/patient complaints: denies any cp/sob    No Known Allergies    MEDICATIONS  (STANDING):  ALBUTerol    90 MICROgram(s) HFA Inhaler 2 Puff(s) Inhalation every 6 hours  aspirin enteric coated 81 milliGRAM(s) Oral daily  cyanocobalamin 500 MICROGram(s) Oral daily  diltiazem    Tablet 60 milliGRAM(s) Oral three times a day  diltiazem Injectable 10 milliGRAM(s) IV Push once  docusate sodium 100 milliGRAM(s) Oral three times a day  enoxaparin Injectable 40 milliGRAM(s) SubCutaneous daily  melatonin 3 milliGRAM(s) Oral at bedtime  metoprolol tartrate 50 milliGRAM(s) Oral two times a day  pantoprazole    Tablet 40 milliGRAM(s) Oral before breakfast  potassium chloride    Tablet ER 10 milliEquivalent(s) Oral daily  senna 2 Tablet(s) Oral at bedtime  valsartan 80 milliGRAM(s) Oral daily    MEDICATIONS  (PRN):  acetaminophen  IVPB. 1000 milliGRAM(s) IV Intermittent once PRN break through pain  ALBUTerol    0.083% 2.5 milliGRAM(s) Nebulizer every 6 hours PRN Shortness of Breath and/or Wheezing  ipratropium    for Nebulization 500 MICROGram(s) Nebulizer every 6 hours PRN Shortness of Breath and/or Wheezing  ketorolac   Injectable 30 milliGRAM(s) IV Push every 6 hours PRN BREAK THROUGH PAIN  naloxone Injectable 0.1 milliGRAM(s) IV Push every 3 minutes PRN For ANY of the following changes in patient status:  A. RR LESS THAN 10 breaths per minute, B. Oxygen saturation LESS THAN 90%, C. Sedation score of 6  ondansetron Injectable 4 milliGRAM(s) IV Push every 6 hours PRN Nausea  oxyCODONE    5 mG/acetaminophen 325 mG 1 Tablet(s) Oral every 4 hours PRN Moderate Pain (4 - 6)      Vital Signs Last 24 Hrs  T(C): 36.5 (14 May 2018 05:09), Max: 36.9 (13 May 2018 22:29)  T(F): 97.7 (14 May 2018 05:09), Max: 98.5 (13 May 2018 22:29)  HR: 111 (14 May 2018 05:09) (100 - 130)  BP: 160/75 (14 May 2018 05:09) (138/75 - 160/75)  BP(mean): --  RR: 18 (14 May 2018 05:09) (18 - 20)  SpO2: 95% (14 May 2018 05:09) (94% - 96%)  ICU Vital Signs Last 24 Hrs  I&O's Summary    13 May 2018 07:01  -  14 May 2018 07:00  --------------------------------------------------------  IN: 580 mL / OUT: 630 mL / NET: -50 mL        PHYSICAL EXAM:  HEENT: no JVD  CARDIOVASCULAR: Normal S1 and S2, No murmur, rub, gallop or lift.   LUNGS: decreased air entry at bases  ABDOMEN: Soft, nontender without mass or organomegaly. bowel sounds normoactive.  EXTREMITIES: no edema          LABS:                        13.4   22.8  )-----------( 332      ( 14 May 2018 05:51 )             40.4     05-14    137  |  98  |  21.0<H>  ----------------------------<  85  4.0   |  28.0  |  0.66    Ca    8.4<L>      14 May 2018 05:51  Mg     2.2     05-13    TPro  5.9<L>  /  Alb  3.0<L>  /  TBili  0.7  /  DBili  x   /  AST  11  /  ALT  8   /  AlkPhos  65  05-14    MELISSA BRADFORD  CARDIAC MARKERS ( 13 May 2018 02:00 )  x     / <0.01 ng/mL / 132 U/L / x     / x            Urinalysis Basic - ( 13 May 2018 15:29 )    Color: Melissa / Appearance: Clear / S.025 / pH: x  Gluc: x / Ketone: Negative  / Bili: Negative / Urobili: Negative mg/dL   Blood: x / Protein: 30 mg/dL / Nitrite: Negative   Leuk Esterase: Moderate / RBC: 0-2 /HPF / WBC >50   Sq Epi: x / Non Sq Epi: Occasional / Bacteria: Occasional        RADIOLOGY & ADDITIONAL STUDIES:    LABS:                        13.4   22.8  )-----------( 332      ( 14 May 2018 05:51 )             40.4     05-14    137  |  98  |  21.0<H>  ----------------------------<  85  4.0   |  28.0  |  0.66    Ca    8.4<L>      14 May 2018 05:51  Mg     2.2     05-13    TPro  5.9<L>  /  Alb  3.0<L>  /  TBili  0.7  /  DBili  x   /  AST  11  /  ALT  8   /  AlkPhos  65  05-14    68y  CARDIAC MARKERS ( 13 May 2018 02:00 )  x     / <0.01 ng/mL / 132 U/L / x     / x            Urinalysis Basic - ( 13 May 2018 15:29 )    Color: Melissa / Appearance: Clear / S.025 / pH: x  Gluc: x / Ketone: Negative  / Bili: Negative / Urobili: Negative mg/dL   Blood: x / Protein: 30 mg/dL / Nitrite: Negative   Leuk Esterase: Moderate / RBC: 0-2 /HPF / WBC >50   Sq Epi: x / Non Sq Epi: Occasional / Bacteria: Occasional        Assessment and Plan:  68y Female with prior history of HTN found to have right lung mass. Now s/p VATS (video-assisted thoracoscopic surgery) and Lobectomy of right lung with video-assisted thoracoscopic surgery. Now s/p chest tube removal. Noted to have AF with RVR. Patient upset that she has to stay, wanted to go home.     AF rate better control  full AC , offered SÁNCHEZ CV refused, Will change to long acting CCB  CT surgery FUP  D/w patient in detail.

## 2018-05-15 NOTE — PROGRESS NOTE ADULT - PROBLEM SELECTOR PLAN 1
day 4 s/p resection - appears stable for d/c home
cardiac eval requested; watch for infection - check u/a and am cbc
cont Amlodipine, Metoprolol, Diovan - appears stable
day 3 s/p resection
Remove R CT  CXR Supplemental O2  drug nebs  transition to po pain Rx
s/p resection  OOB, ambulate as tolerated  spo2 stable on RA, encourage IS  tolerating diet, cont protonix,   monitor H/H, Bun/Cr, lytes  d/w Dr. Lopez in AM rounds
s/p right lower lobectomy via VATS  Encourage the use of I/S, cough & deep breathing exercises, OOB to chair.   Encourage ambulation, shower today.  D/C to home today.   Discussed with Dr. Lopez & CT surgery in am rounds.

## 2018-05-15 NOTE — PROGRESS NOTE ADULT - PROBLEM SELECTOR PROBLEM 1
Lung mass
Essential hypertension
Lung mass
New onset atrial fibrillation
Lung mass

## 2018-05-15 NOTE — DISCHARGE NOTE ADULT - CARE PROVIDER_API CALL
Adam Lopez), Surgery; Thoracic Surgery  [address] [facility]  [address] [place] [address]  Phone: [phone]  Fax: [phone]    Aaron Mancini), Family Medicine  [address] [place] [address]  Phone: [phone]  Fax: [phone]

## 2018-05-15 NOTE — DISCHARGE NOTE ADULT - PLAN OF CARE
recover from surgery Follow with Dr. Lopez on 5/21 at 2:30pm.  Arrange a follow up with Dr. Mancini & your cardiologist. Continue Eliquis BID Continue Lopressor

## 2018-05-15 NOTE — PROGRESS NOTE ADULT - PROBLEM SELECTOR PROBLEM 2
New onset atrial fibrillation
COPD (chronic obstructive pulmonary disease)
Lung mass
New onset atrial fibrillation

## 2018-05-15 NOTE — PROGRESS NOTE ADULT - PROBLEM SELECTOR PLAN 4
Continue Protonix for GI prophylaxis.  Continue SCD's & increase ambulation.
cont home meds
currently under a lot of stress   does not want to speak to anyone at this time, states she just needs to leave the hospital and she will feel better

## 2018-05-15 NOTE — PROGRESS NOTE ADULT - PROBLEM SELECTOR PROBLEM 3
Hypertension
COPD (chronic obstructive pulmonary disease)
Hypertension
Lung mass
Essential hypertension
Essential hypertension

## 2018-05-15 NOTE — PROGRESS NOTE ADULT - ASSESSMENT
Assessment  Patient is a 68y old  Female who presents with a chief complaint of lung mass. PMHx HTN, endometriosis    On 5/11/18, patient underwent VATS (video-assisted thoracoscopic surgery)  Bronchoscopy, flexible  Lobectomy of right lung with video-assisted thoracoscopic surgery.    Postoperative course/issues: increased chest tube drainage (160 cc in 3 hours this morning)    PLAN  Neuro: Pain management, Fentanyl PCA until tubes removed  Pulm: Encourage coughing, deep breathing and use of incentive spirometry. Wean off supplemental oxygen as able. Daily CXR.   Cardio: Monitor telemetry/alarms  GI: Tolerating diet. Continue stool softeners.  Renal: Daily BMP, supplement electrolytes as needed  Vasc: Lovenox/SCDs for DVT prophylaxis  Heme: Stable H/H. Trend CBC daily.   ID: Off antibiotics. Stable.  Therapy: OOB/ambulate  Tubes: Maintain for now, possibly remove sunday  Disposition: Aim to D/C to home on Sunday/Monday  Discussed with Cardiothoracic Team at AM rounds.
68F. PMHx HTN, endometriosis, fibroid tumor, ovarian cyst, marijuana smoker, with suspicious lung nodule on routine CXR, PET scan concerning, now s/p VATS Right lower lobectomy 5/11 with Dr. Lopez, postop course complicated by AF, uptitrating cardizem;
Assessment  Patient is a 68y old  Female who presents with a chief complaint of lung mass. PMHx HTN, endometriosis    On 5/11/18, patient underwent VATS (video-assisted thoracoscopic surgery)  Bronchoscopy, flexible  Lobectomy of right lung with video-assisted thoracoscopic surgery.    Postoperative course/issues: increased chest tube drainage (160 cc in 3 hours this morning)
68F. PMHx HTN, endometriosis, fibroid tumor, ovarian cyst, marijuana smoker, with suspicious lung nodule on routine CXR, PET scan concerning, now s/p VATS Right lower lobectomy 5/11 with Dr. Lopez, postop course complicated by AF, uptitrating cardizem;

## 2018-05-15 NOTE — DISCHARGE NOTE ADULT - INSTRUCTIONS
1. Daily Shower  2. Weight yourself daily and notify any weight gain greater than 2-3 pounds in 24 hours.  3. Regular diet - low fat, low cholesterol, no added salt.  4. Cleanse right thoracotomy incision daily while showering with warm water and mild soap, pat dry and maintain open to air.   DO NOT APPLY ANY LOTION, CREAMS, OR POWDERS TO INCISIONS, KEEP OPEN TO AIR  5. No driving until cleared by MD.   6. No heavy lifting nothing greater than 5 pounds until cleared by MD.   7. Call / Notify MD any fever greater than 101.0  8. Increase Activity as tolerated.  9. Utilize heart pillow to splint pillow Choose lean meats and poultry without skin and prepare them without added saturated and trans fat.  Eat fish at least twice a week. Recent research shows that eating oily fish containing omega-3 fatty acids (for example, salmon, trout and herring) may help lower your risk of death from coronary artery disease.  Select fat-free, 1 percent fat and low-fat dairy products.  Cut back on foods containing partially hydrogenated vegetable oils to reduce trans fat in your diet.   To lower cholesterol, reduce saturated fat to no more than 5 to 6 percent of total calories. For someone eating 2,000 calories a day, that’s about 13 grams of saturated fat.  Cut back on beverages and foods with added sugars.  Choose and prepare foods with little or no salt. To lower blood pressure, aim to eat no more than 2,400 milligrams of sodium per day. Reducing daily intake to 1,500 mg is desirable because it can lower blood pressure even further.  If you drink alcohol, drink in moderation. That means one drink per day if you’re a woman and two drinks  per day if you’re a man.  Follow the American Heart Association recommendations when you eat out, and keep an eye on your portion sizes.

## 2018-05-15 NOTE — DISCHARGE NOTE ADULT - NS AS ACTIVITY OBS
Walking-Indoors allowed/Showering allowed/Do not make important decisions/Do not drive or operate machinery/Stairs allowed/No Heavy lifting/straining/Sex allowed/Walking-Outdoors allowed/Return to Work/School allowed

## 2018-05-21 ENCOUNTER — APPOINTMENT (OUTPATIENT)
Dept: THORACIC SURGERY | Facility: CLINIC | Age: 68
End: 2018-05-21
Payer: MEDICARE

## 2018-05-21 ENCOUNTER — OUTPATIENT (OUTPATIENT)
Dept: OUTPATIENT SERVICES | Facility: HOSPITAL | Age: 68
LOS: 1 days | End: 2018-05-21
Payer: MEDICARE

## 2018-05-21 VITALS
RESPIRATION RATE: 16 BRPM | DIASTOLIC BLOOD PRESSURE: 90 MMHG | OXYGEN SATURATION: 94 % | WEIGHT: 164 LBS | HEIGHT: 63 IN | BODY MASS INDEX: 29.06 KG/M2 | SYSTOLIC BLOOD PRESSURE: 127 MMHG | HEART RATE: 64 BPM

## 2018-05-21 DIAGNOSIS — R22.2 LOCALIZED SWELLING, MASS AND LUMP, TRUNK: ICD-10-CM

## 2018-05-21 DIAGNOSIS — D25.9 LEIOMYOMA OF UTERUS, UNSPECIFIED: Chronic | ICD-10-CM

## 2018-05-21 DIAGNOSIS — Z87.42 PERSONAL HISTORY OF OTHER DISEASES OF THE FEMALE GENITAL TRACT: Chronic | ICD-10-CM

## 2018-05-21 LAB — SURGICAL PATHOLOGY FINAL REPORT - CH: SIGNIFICANT CHANGE UP

## 2018-05-21 PROCEDURE — 71046 X-RAY EXAM CHEST 2 VIEWS: CPT

## 2018-05-21 PROCEDURE — 99024 POSTOP FOLLOW-UP VISIT: CPT

## 2018-05-21 PROCEDURE — 71046 X-RAY EXAM CHEST 2 VIEWS: CPT | Mod: 26

## 2018-05-21 RX ORDER — ALPRAZOLAM 0.5 MG/1
0.5 TABLET ORAL
Qty: 30 | Refills: 0 | Status: ACTIVE | COMMUNITY
Start: 2018-04-16

## 2018-05-21 RX ORDER — POTASSIUM CHLORIDE 750 MG/1
10 CAPSULE, EXTENDED RELEASE ORAL
Qty: 60 | Refills: 0 | Status: COMPLETED | COMMUNITY
Start: 2018-05-01

## 2018-05-21 RX ORDER — HYDROCHLOROTHIAZIDE 25 MG/1
25 TABLET ORAL
Qty: 90 | Refills: 0 | Status: COMPLETED | COMMUNITY
Start: 2018-04-20

## 2018-06-04 LAB — PATH REPORT ADDENDUM.SYNOPTIC DOC: SIGNIFICANT CHANGE UP

## 2018-06-05 LAB — PATH REPORT ADDENDUM.SYNOPTIC DOC: SIGNIFICANT CHANGE UP

## 2018-06-07 LAB — PATH REPORT ADDENDUM.SYNOPTIC DOC: SIGNIFICANT CHANGE UP

## 2018-08-27 ENCOUNTER — OUTPATIENT (OUTPATIENT)
Dept: OUTPATIENT SERVICES | Facility: HOSPITAL | Age: 68
LOS: 1 days | End: 2018-08-27
Payer: MEDICARE

## 2018-08-27 ENCOUNTER — APPOINTMENT (OUTPATIENT)
Dept: ULTRASOUND IMAGING | Facility: CLINIC | Age: 68
End: 2018-08-27
Payer: MEDICARE

## 2018-08-27 DIAGNOSIS — Z87.42 PERSONAL HISTORY OF OTHER DISEASES OF THE FEMALE GENITAL TRACT: Chronic | ICD-10-CM

## 2018-08-27 DIAGNOSIS — D25.9 LEIOMYOMA OF UTERUS, UNSPECIFIED: Chronic | ICD-10-CM

## 2018-08-27 DIAGNOSIS — Z00.8 ENCOUNTER FOR OTHER GENERAL EXAMINATION: ICD-10-CM

## 2018-08-27 PROBLEM — N80.9 ENDOMETRIOSIS, UNSPECIFIED: Chronic | Status: ACTIVE | Noted: 2018-04-27

## 2018-08-27 PROBLEM — I10 ESSENTIAL (PRIMARY) HYPERTENSION: Chronic | Status: ACTIVE | Noted: 2018-04-27

## 2018-08-27 PROCEDURE — 93971 EXTREMITY STUDY: CPT

## 2018-08-27 PROCEDURE — 93971 EXTREMITY STUDY: CPT | Mod: 26

## 2018-09-04 ENCOUNTER — APPOINTMENT (OUTPATIENT)
Dept: ULTRASOUND IMAGING | Facility: CLINIC | Age: 68
End: 2018-09-04
Payer: MEDICARE

## 2018-09-04 ENCOUNTER — OUTPATIENT (OUTPATIENT)
Dept: OUTPATIENT SERVICES | Facility: HOSPITAL | Age: 68
LOS: 1 days | End: 2018-09-04
Payer: MEDICARE

## 2018-09-04 DIAGNOSIS — Z87.42 PERSONAL HISTORY OF OTHER DISEASES OF THE FEMALE GENITAL TRACT: Chronic | ICD-10-CM

## 2018-09-04 DIAGNOSIS — Z00.8 ENCOUNTER FOR OTHER GENERAL EXAMINATION: ICD-10-CM

## 2018-09-04 DIAGNOSIS — D25.9 LEIOMYOMA OF UTERUS, UNSPECIFIED: Chronic | ICD-10-CM

## 2018-09-04 PROCEDURE — 76856 US EXAM PELVIC COMPLETE: CPT | Mod: 26

## 2018-09-04 PROCEDURE — 76700 US EXAM ABDOM COMPLETE: CPT

## 2018-09-04 PROCEDURE — 76700 US EXAM ABDOM COMPLETE: CPT | Mod: 26

## 2018-09-04 PROCEDURE — 76856 US EXAM PELVIC COMPLETE: CPT

## 2018-09-20 ENCOUNTER — APPOINTMENT (OUTPATIENT)
Dept: CT IMAGING | Facility: CLINIC | Age: 68
End: 2018-09-20
Payer: MEDICARE

## 2018-09-20 ENCOUNTER — OUTPATIENT (OUTPATIENT)
Dept: OUTPATIENT SERVICES | Facility: HOSPITAL | Age: 68
LOS: 1 days | End: 2018-09-20
Payer: MEDICARE

## 2018-09-20 DIAGNOSIS — Z00.8 ENCOUNTER FOR OTHER GENERAL EXAMINATION: ICD-10-CM

## 2018-09-20 DIAGNOSIS — D25.9 LEIOMYOMA OF UTERUS, UNSPECIFIED: Chronic | ICD-10-CM

## 2018-09-20 DIAGNOSIS — Z87.42 PERSONAL HISTORY OF OTHER DISEASES OF THE FEMALE GENITAL TRACT: Chronic | ICD-10-CM

## 2018-09-20 DIAGNOSIS — R22.2 LOCALIZED SWELLING, MASS AND LUMP, TRUNK: ICD-10-CM

## 2018-09-20 PROCEDURE — 71260 CT THORAX DX C+: CPT | Mod: 26

## 2018-09-20 PROCEDURE — 82565 ASSAY OF CREATININE: CPT

## 2018-09-20 PROCEDURE — 71260 CT THORAX DX C+: CPT

## 2018-09-20 PROCEDURE — 74177 CT ABD & PELVIS W/CONTRAST: CPT

## 2018-09-20 PROCEDURE — 74177 CT ABD & PELVIS W/CONTRAST: CPT | Mod: 26

## 2018-10-12 ENCOUNTER — INPATIENT (INPATIENT)
Facility: HOSPITAL | Age: 68
LOS: 5 days | Discharge: HOME | DRG: 65 | End: 2018-10-18
Attending: FAMILY MEDICINE | Admitting: INTERNAL MEDICINE
Payer: MEDICARE

## 2018-10-12 VITALS
HEART RATE: 104 BPM | TEMPERATURE: 97 F | OXYGEN SATURATION: 95 % | DIASTOLIC BLOOD PRESSURE: 155 MMHG | SYSTOLIC BLOOD PRESSURE: 213 MMHG | RESPIRATION RATE: 18 BRPM | WEIGHT: 160.06 LBS | HEIGHT: 63 IN

## 2018-10-12 DIAGNOSIS — I10 ESSENTIAL (PRIMARY) HYPERTENSION: ICD-10-CM

## 2018-10-12 DIAGNOSIS — I16.1 HYPERTENSIVE EMERGENCY: ICD-10-CM

## 2018-10-12 DIAGNOSIS — I61.9 NONTRAUMATIC INTRACEREBRAL HEMORRHAGE, UNSPECIFIED: ICD-10-CM

## 2018-10-12 DIAGNOSIS — Z87.42 PERSONAL HISTORY OF OTHER DISEASES OF THE FEMALE GENITAL TRACT: Chronic | ICD-10-CM

## 2018-10-12 DIAGNOSIS — D25.9 LEIOMYOMA OF UTERUS, UNSPECIFIED: Chronic | ICD-10-CM

## 2018-10-12 LAB
ANION GAP SERPL CALC-SCNC: 17 MMOL/L — SIGNIFICANT CHANGE UP (ref 5–17)
APTT BLD: 43.5 SEC — HIGH (ref 27.5–37.4)
BUN SERPL-MCNC: 13 MG/DL — SIGNIFICANT CHANGE UP (ref 8–20)
CALCIUM SERPL-MCNC: 9.4 MG/DL — SIGNIFICANT CHANGE UP (ref 8.6–10.2)
CHLORIDE SERPL-SCNC: 94 MMOL/L — LOW (ref 98–107)
CK SERPL-CCNC: 45 U/L — SIGNIFICANT CHANGE UP (ref 25–170)
CO2 SERPL-SCNC: 26 MMOL/L — SIGNIFICANT CHANGE UP (ref 22–29)
CREAT SERPL-MCNC: 0.82 MG/DL — SIGNIFICANT CHANGE UP (ref 0.5–1.3)
GLUCOSE SERPL-MCNC: 125 MG/DL — HIGH (ref 70–115)
HCT VFR BLD CALC: 46 % — SIGNIFICANT CHANGE UP (ref 37–47)
HGB BLD-MCNC: 16.4 G/DL — HIGH (ref 12–16)
INR BLD: 1.41 RATIO — HIGH (ref 0.88–1.16)
MCHC RBC-ENTMCNC: 28.7 PG — SIGNIFICANT CHANGE UP (ref 27–31)
MCHC RBC-ENTMCNC: 35.7 G/DL — SIGNIFICANT CHANGE UP (ref 32–36)
MCV RBC AUTO: 80.4 FL — LOW (ref 81–99)
PLATELET # BLD AUTO: 444 K/UL — HIGH (ref 150–400)
POTASSIUM SERPL-MCNC: 3.5 MMOL/L — SIGNIFICANT CHANGE UP (ref 3.5–5.3)
POTASSIUM SERPL-SCNC: 3.5 MMOL/L — SIGNIFICANT CHANGE UP (ref 3.5–5.3)
PROTHROM AB SERPL-ACNC: 15.6 SEC — HIGH (ref 9.8–12.7)
RBC # BLD: 5.72 M/UL — HIGH (ref 4.4–5.2)
RBC # FLD: 14 % — SIGNIFICANT CHANGE UP (ref 11–15.6)
SODIUM SERPL-SCNC: 137 MMOL/L — SIGNIFICANT CHANGE UP (ref 135–145)
TROPONIN T SERPL-MCNC: <0.01 NG/ML — SIGNIFICANT CHANGE UP (ref 0–0.06)
WBC # BLD: 15.9 K/UL — HIGH (ref 4.8–10.8)
WBC # FLD AUTO: 15.9 K/UL — HIGH (ref 4.8–10.8)

## 2018-10-12 PROCEDURE — 70496 CT ANGIOGRAPHY HEAD: CPT | Mod: 26

## 2018-10-12 PROCEDURE — 70450 CT HEAD/BRAIN W/O DYE: CPT | Mod: 26,59

## 2018-10-12 PROCEDURE — 99291 CRITICAL CARE FIRST HOUR: CPT

## 2018-10-12 PROCEDURE — 70498 CT ANGIOGRAPHY NECK: CPT | Mod: 26

## 2018-10-12 PROCEDURE — 99232 SBSQ HOSP IP/OBS MODERATE 35: CPT

## 2018-10-12 PROCEDURE — 70450 CT HEAD/BRAIN W/O DYE: CPT | Mod: 26,59,77

## 2018-10-12 PROCEDURE — 93010 ELECTROCARDIOGRAM REPORT: CPT

## 2018-10-12 PROCEDURE — 71045 X-RAY EXAM CHEST 1 VIEW: CPT | Mod: 26

## 2018-10-12 RX ORDER — ALPRAZOLAM 0.25 MG
0 TABLET ORAL
Qty: 30 | Refills: 0 | COMMUNITY

## 2018-10-12 RX ORDER — DILTIAZEM HCL 120 MG
240 CAPSULE, EXT RELEASE 24 HR ORAL DAILY
Qty: 0 | Refills: 0 | Status: DISCONTINUED | OUTPATIENT
Start: 2018-10-12 | End: 2018-10-18

## 2018-10-12 RX ORDER — SODIUM CHLORIDE 9 MG/ML
3 INJECTION INTRAMUSCULAR; INTRAVENOUS; SUBCUTANEOUS ONCE
Qty: 0 | Refills: 0 | Status: COMPLETED | OUTPATIENT
Start: 2018-10-12 | End: 2018-10-12

## 2018-10-12 RX ORDER — LABETALOL HCL 100 MG
40 TABLET ORAL ONCE
Qty: 0 | Refills: 0 | Status: COMPLETED | OUTPATIENT
Start: 2018-10-12 | End: 2018-10-12

## 2018-10-12 RX ORDER — LABETALOL HCL 100 MG
20 TABLET ORAL ONCE
Qty: 0 | Refills: 0 | Status: COMPLETED | OUTPATIENT
Start: 2018-10-12 | End: 2018-10-12

## 2018-10-12 RX ORDER — INFLUENZA VIRUS VACCINE 15; 15; 15; 15 UG/.5ML; UG/.5ML; UG/.5ML; UG/.5ML
0.5 SUSPENSION INTRAMUSCULAR ONCE
Qty: 0 | Refills: 0 | Status: DISCONTINUED | OUTPATIENT
Start: 2018-10-12 | End: 2018-10-18

## 2018-10-12 RX ORDER — HYDRALAZINE HCL 50 MG
10 TABLET ORAL ONCE
Qty: 0 | Refills: 0 | Status: COMPLETED | OUTPATIENT
Start: 2018-10-12 | End: 2018-10-12

## 2018-10-12 RX ORDER — METOPROLOL TARTRATE 50 MG
0 TABLET ORAL
Qty: 180 | Refills: 0 | COMMUNITY

## 2018-10-12 RX ORDER — VALSARTAN 80 MG/1
0 TABLET ORAL
Qty: 0 | Refills: 0 | COMMUNITY

## 2018-10-12 RX ORDER — NICARDIPINE HYDROCHLORIDE 30 MG/1
5 CAPSULE, EXTENDED RELEASE ORAL
Qty: 40 | Refills: 0 | Status: DISCONTINUED | OUTPATIENT
Start: 2018-10-12 | End: 2018-10-13

## 2018-10-12 RX ADMIN — Medication 20 MILLIGRAM(S): at 12:58

## 2018-10-12 RX ADMIN — NICARDIPINE HYDROCHLORIDE 25 MG/HR: 30 CAPSULE, EXTENDED RELEASE ORAL at 14:20

## 2018-10-12 RX ADMIN — SODIUM CHLORIDE 3 MILLILITER(S): 9 INJECTION INTRAMUSCULAR; INTRAVENOUS; SUBCUTANEOUS at 12:58

## 2018-10-12 RX ADMIN — Medication 20 MILLIGRAM(S): at 13:20

## 2018-10-12 RX ADMIN — NICARDIPINE HYDROCHLORIDE 25 MG/HR: 30 CAPSULE, EXTENDED RELEASE ORAL at 19:34

## 2018-10-12 RX ADMIN — Medication 240 MILLIGRAM(S): at 21:42

## 2018-10-12 NOTE — H&P ADULT - ASSESSMENT
67 y/o female pmhx HTN, afib on Eliquis (last dose taken this morning) presented to ED today w/ slurred speech and left sided weakness. Pt admitted w/ acute intracranial hemorrhage due to hypertensive emergency.

## 2018-10-12 NOTE — CONSULT NOTE ADULT - ATTENDING COMMENTS
NSGY Attg:    see above    imaging reviewed    CT head with pontine ICH; no SAH    agree with plan as above  - CTA pending  - recommend MRI w and wo contrast given patient's ca history (although ICH more likely hypertensive ICH rather than hemorrhagic met)

## 2018-10-12 NOTE — ED ADULT NURSE NOTE - NSIMPLEMENTINTERV_GEN_ALL_ED
Implemented All Universal Safety Interventions:  [place] to call system. Call bell, personal items and telephone within reach. Instruct patient to call for assistance. Room bathroom lighting operational. Non-slip footwear when patient is off stretcher. Physically safe environment: no spills, clutter or unnecessary equipment. Stretcher in lowest position, wheels locked, appropriate side rails in place.

## 2018-10-12 NOTE — ED ADULT NURSE NOTE - OBJECTIVE STATEMENT
68 year old a&ox3 female comes to ED complaining of trouble hearing starting on Wednesday. pt c/o feeling lethargic. patient states people said she sounded "drunk". patient is on Eliquis for a-fib.

## 2018-10-12 NOTE — H&P ADULT - PROBLEM SELECTOR PLAN 2
related to hypertensive crisis  Initial CT head w/ pontine hemorrhage.  Repeat CT head 6hrs  neuro checks q1hr  PT/OT/ speech and swallow   Neuro surgery following f/u recommendations   Needs MRI brain due to hx of lung ca

## 2018-10-12 NOTE — H&P ADULT - RS GEN PE MLT RESP DETAILS PC
good air movement/normal/clear to auscultation bilaterally/respirations non-labored/airway patent/breath sounds equal

## 2018-10-12 NOTE — CONSULT NOTE ADULT - ASSESSMENT
69y/o female with HTN and a.fib on Eliquis last dose this morning who presents with 48 hours of LUE>LLE weakness and mild dysarthria. Presenting BP >240mmHg systolic and HCT with small mid pontine hemorrhage.    PLAN:  - STAT CTA H/N - if vascular abnormality is appreciated we will transfer to [facility] for neurovasc management, otherwise will admit to MICU for q1h neuro checks and 24 hr observation  - Strict BP control goal <160mmHg - cardene drip started  - HOB30  - Coag panel  - Hold ACT/APT  - Discussed with attending  Further recommendations pending results of above work up 67y/o female with HTN and a.fib on Eliquis last dose this morning who presents with 48 hours of LUE>LLE weakness and mild dysarthria. Presenting BP >240mmHg systolic and HCT with small mid pontine hemorrhage.    PLAN:  - STAT CTA H/N unremarkable for aneurysm of AVM. 50% ICA stenosis noted  - MICU admit for q1h neurochecks  - Repeat HCT 6 hours from CTA  - Also recommend MRI Brain w/wo d/t history of lung cancer  - Strict BP control goal <160mmHg - cardene drip started  - HOB30  - Coag panel  - Hold ACT/APT  - Discussed with attending  Further recommendations pending results of above work up

## 2018-10-12 NOTE — H&P ADULT - NSHPLABSRESULTS_GEN_ALL_CORE
< from: CT Angio Neck w/ IV Cont (10.12.18 @ 15:25) >    INTERPRETATION:  CLINICAL HISTORY: Intracerebral hemorrhage, pontine   hemorrhage    TECHNIQUE: CTA brain and neck. 88 cc Omnipaque 350 Intravenous contrast   was administered. 2-D MIP and 3-D volume rendering images.    COMPARISON: CT head dated 10/12/2018    FINDINGS:    Stable midline pontine hemorrhage.    CTA BRAIN:   Fetal origin to the right posterior cerebral artery.  The [place] and vertebrobasilar system are unremarkable without   evidence of stenosis, occlusion or saccular aneurysm dilation. No   evidence for arterial venous malformation. The vertebral arteries are   codominant.      CTA NECK:   Moderate plaque in the left carotid bulb extending into the proximal   internal carotid artery causing 50% stenosis in the proximal left   internal carotid artery. Mild plaque in the right carotid bulb. In turn   involves the proximal right internal carotid artery.  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 There is normal relationship to   the great vessels common origin to the innominate artery left common   carotid artery.. The common carotid arteries, internal carotid arteries   and vertebral arteries shows no other evidence of significant stenosis,   occlusion or saccular aneurysm dilation. The vertebral arteries are   codominant.      Subtle bilateral upper lobe foci of airspace disease. Dedicated   noncontrast chestCT is recommended.    IMPRESSION:          No evidence of arterial venous malformation or aneurysm. Stable   pontine hemorrhage. 50% stenosis in the proximal left internal carotid   artery.      < end of copied text >

## 2018-10-12 NOTE — CONSULT NOTE ADULT - SUBJECTIVE AND OBJECTIVE BOX
HISTORY OF PRESENT ILLNESS:     PAST MEDICAL & SURGICAL HISTORY:  Endometriosis  Hypertension  Fibroid tumor  History of ovarian cyst    FAMILY HISTORY:  Family history of premature CAD (Sibling)  Family history of hypertension (Sibling)  Family history of heart disease      SOCIAL HISTORY:      Allergies  No Known Allergies      REVIEW OF SYSTEMS  12 pt ROS otherwise unremarkable as per HPI      MEDICATIONS:  Eliquis    Vital Signs Last 24 Hrs  T(C): 36.3 (12 Oct 2018 12:02), Max: 36.3 (12 Oct 2018 12:02)  T(F): 97.4 (12 Oct 2018 12:02), Max: 97.4 (12 Oct 2018 12:02)  HR: 74 (12 Oct 2018 13:02) (74 - 104)  BP: 223/104 (12 Oct 2018 13:02) (213/155 - 241/112)  RR: 18 (12 Oct 2018 12:02) (18 - 18)  SpO2: 95% (12 Oct 2018 12:02) (95% - 95%)  PHYSICAL EXAM:  GENERAL: NAD, well-groomed, well-developed  HEAD:  Atraumatic, normocephalic  EYES: Conjunctiva and sclera edil  ENMT: moist mucous membranes, good dentition, no lesions  NECK: Supple, no JVD  MENTAL STATUS: AAO x3; Appropriately conversant without aphasia; following simple commands  CRANIAL NERVES: PERRL; EOMI; no facial asymmetry; facial sensation grossly intact to light touch b/l;  tongue midline  MOTOR: strength 5/5 B/L upper and lower extremities  COORDINATION: Pronator drift; rapid alternating movements intact b/l upper extremities; no upper extremity dysmetria  SENSATION: grossly intact to light touch all extremities  MUSCLE STRETCH REFLEXES: DTRs 2+ intact and symmetric; no Anand's b/l; no clonus b/l  PLANTAR:   CHEST/LUNG: Clear to auscultation bilaterally  HEART: +S1/+S2; Regular rate and rhythm  ABDOMEN: Soft, nontender, nondistended; bowel sounds present all four quadrants  EXTREMITIES:  2+ peripheral pulses, no clubbing, cyanosis, or edema  SKIN: Warm, dry; no rashes or lesions    LABS:             16.4   15.9  )-----------( 444                   46.0     137  |  94<L>  |  13.0  ----------------------------<  125<H>  3.5   |  26.0  |  0.82    Ca    9.4         RADIOLOGY & ADDITIONAL STUDIES:  CT Head No Cont (10.12.18 @ 13:15)  Impression: Small mid pontine hemorrhage. Diffuse atrophy and   microvascular disease consistent with age.    < end of copied text > HISTORY OF PRESENT ILLNESS:   Mrs. Land is a 69 y/o female with a history of a.fib on Eliquis last dose this morning, HTN, tobacco abuse (quit 1 year ago) and stage I lung cancer s/p resection in May of this year. Two days ago she noticed a disorientation in her hearing reporting "it sounded like everyone was under water" which lasted until yesterday morning and was self-limited. She then started to notice LUE/LLE weakness and was dropping things. She denies any sensory deficits or dysphagia, dysarthria or aphasia. The weakness has not progressed but d/t it's persistence she came to ED for further evaluation.  At presentation the patient's BP was >240mmHg systolic.      PAST MEDICAL & SURGICAL HISTORY:  Endometriosis  Hypertension  Fibroid tumor  History of ovarian cyst  Stage I lung cancer s/p resection in May 2018    FAMILY HISTORY:  Family history of premature CAD (Sibling)  Family history of hypertension (Sibling)  Family history of heart disease      SOCIAL HISTORY:  Smoked 50+ pack years, quit one year ago  Drinks regularly    Allergies  No Known Allergies      REVIEW OF SYSTEMS  12 pt ROS otherwise unremarkable as per HPI      MEDICATIONS:  Eliquis    Vital Signs Last 24 Hrs  T(C): 36.3 (12 Oct 2018 12:02), Max: 36.3 (12 Oct 2018 12:02)  T(F): 97.4 (12 Oct 2018 12:02), Max: 97.4 (12 Oct 2018 12:02)  HR: 74 (12 Oct 2018 13:02) (74 - 104)  BP: 223/104 (12 Oct 2018 13:02) (213/155 - 241/112)  RR: 18 (12 Oct 2018 12:02) (18 - 18)  SpO2: 95% (12 Oct 2018 12:02) (95% - 95%)  PHYSICAL EXAM:  GENERAL: NAD, well-groomed, well-developed  HEAD:  Atraumatic, normocephalic  EYES: Conjunctiva and sclera edil  ENMT: moist mucous membranes, good dentition, no lesions  NECK: Supple, no JVD  MENTAL STATUS: AAO x3; Appropriately conversant without aphasia with high and low frequency objects; repetition is intact, good concentration, following simple commands  CRANIAL NERVES: PERRL; EOMI; no dysconjugate gaze, diminished L NLF with symmetric activation, facial sensation grossly intact to light touch b/l;  tongue midline  MOTOR: strength 5/5 B/L upper and lower extremities  COORDINATION: Pronator drift; rapid alternating movements intact b/l upper extremities; no upper extremity dysmetria  SENSATION: grossly intact to light touch all extremities  MUSCLE STRETCH REFLEXES: DTRs 2+ intact and symmetric; no Anand's b/l; no clonus b/l  PLANTAR:   CHEST/LUNG: Clear to auscultation bilaterally  HEART: +S1/+S2; Regular rate and rhythm  ABDOMEN: Soft, nontender, nondistended; bowel sounds present all four quadrants  EXTREMITIES:  2+ peripheral pulses, no clubbing, cyanosis, or edema  SKIN: Warm, dry; no rashes or lesions    LABS:             16.4   15.9  )-----------( 444                   46.0     137  |  94<L>  |  13.0  ----------------------------<  125<H>  3.5   |  26.0  |  0.82    Ca    9.4         RADIOLOGY & ADDITIONAL STUDIES:  CT Head No Cont (10.12.18 @ 13:15)  Impression: Small mid pontine hemorrhage. Diffuse atrophy and   microvascular disease consistent with age.    < end of copied text > HISTORY OF PRESENT ILLNESS:   Mrs. Land is a 67 y/o female with a history of a.fib on Eliquis last dose this morning, HTN, tobacco abuse (quit 1 year ago) and stage I lung cancer s/p resection in May of this year. Two days ago she noticed a disorientation in her hearing reporting "it sounded like everyone was under water" which lasted until yesterday morning and was self-limited. She then started to notice LUE/LLE weakness and was dropping things. She denies any sensory deficits or dysphagia, dysarthria or aphasia. The weakness has not progressed but d/t it's persistence she came to ED for further evaluation.  At presentation the patient's BP was >240mmHg systolic.  A CTH revealed small mid pontine hemorrhage. The patient is GCS15 and provides the history. Siblings at the bedside. She currently denies nausea or vomiting or vision changes.     PAST MEDICAL & SURGICAL HISTORY:  Endometriosis  Hypertension  Fibroid tumor  History of ovarian cyst  Stage I lung cancer s/p resection in May 2018    FAMILY HISTORY:  Family history of premature CAD (Sibling)  Family history of hypertension (Sibling)  Family history of heart disease (father)    SOCIAL HISTORY:  Smoked 50+ pack years, quit one year ago  Drinks regularly    Allergies  No Known Allergies    REVIEW OF SYSTEMS  12 pt ROS otherwise unremarkable as per HPI    MEDICATIONS:  Eliquis  Diltiazem    Vital Signs Last 24 Hrs  T(C): 36.3 (12 Oct 2018 12:02), Max: 36.3 (12 Oct 2018 12:02)  T(F): 97.4 (12 Oct 2018 12:02), Max: 97.4 (12 Oct 2018 12:02)  HR: 74 (12 Oct 2018 13:02) (74 - 104)  BP: 223/104 (12 Oct 2018 13:02) (213/155 - 241/112)  RR: 18 (12 Oct 2018 12:02) (18 - 18)  SpO2: 95% (12 Oct 2018 12:02) (95% - 95%)  PHYSICAL EXAM:  GENERAL: NAD, well-groomed, well-developed  HEAD:  Atraumatic, normocephalic  EYES: Conjunctiva and sclera clear  ENMT: moist mucous membranes, good dentition, no lesions  NECK: Supple, no JVD  MENTAL STATUS: AAO x3; Appropriately conversant without aphasia with high and low frequency objects; repetition is intact, good concentration, following simple commands  CRANIAL NERVES: PERRL; EOMI; no dysconjugate gaze, diminished L NLF with symmetric activation, facial sensation grossly intact to light touch b/l; mild dysarthria, tongue midline  MOTOR: strength 4+/5 prximal LUE, 4/5 distal LUE, 5-/5 LLE throughout, 5/5 RUE/RLE B/L  COORDINATION: + L Pronator drift; rapid alternating movements inhibited on the left; + b/l dysmetria L>R, neg central ataxia  SENSATION: grossly intact to light touch all extremities  MUSCLE STRETCH REFLEXES: no Anand's b/l; no clonus b/l  CHEST/LUNG: Clear to auscultation bilaterally  HEART: +S1/+S2; Regular rate and rhythm  ABDOMEN: Soft, nontender, nondistended  EXTREMITIES:  2+ peripheral pulses, no clubbing, cyanosis, or edema  SKIN: Warm, dry; no rashes or lesions    LABS:             16.4   15.9  )-----------( 444                   46.0     137  |  94<L>  |  13.0  ----------------------------<  125<H>  3.5   |  26.0  |  0.82    Ca    9.4         RADIOLOGY & ADDITIONAL STUDIES:  CT Head No Cont (10.12.18 @ 13:15)  Impression: Small mid pontine hemorrhage. Diffuse atrophy and   microvascular disease consistent with age.

## 2018-10-12 NOTE — H&P ADULT - HISTORY OF PRESENT ILLNESS
69 y/o female pmhx HTN, afib on Eliquis (last dose taken this morning) presented to ED today w/ slurred speech and left sided weakness. She states the slurred speech began last night and continued into this afternoon along w/ new left upper extremity weakness. She denies any hx of prior CVA. She denies any blurry vision, diplopia, gait disturbances, CP/SOB, N/V, fever/chills or headace. On arrival found to be hypertensive w/ . Given IV labetololx2 w/out relief and started on nicardipine drip. She states she has been compliant w/ her BP meds.  CT head shows small mid pontine hemorrhage. CTA head negative AV malformation or anuerysm.

## 2018-10-12 NOTE — ED PROVIDER NOTE - OBJECTIVE STATEMENT
68 year old female with h /o HTN . ATRIAL fibrillation on Eliquis presents with left sided facial and arm numbness and slurred speech for 2 days

## 2018-10-12 NOTE — ED STATDOCS - PROGRESS NOTE DETAILS
Patient is a 68 year old F with a PMHx of HTN, on meds, who presents to the ED complaining of left sided weakness and facial numbness x2 days.  Patient states that on Wednesday afternoon she "felt underwater" when she would talk.  Reports she did a lot of work in the afternoon and went to bed.  Got up yesterday, did some laundry and was feeling tired.  She went to sleep for a little, woke up and was still very tired.  Called someone yesterday and they thought patient was drunk because of the way she was talking.  Patient is a former smoker.  Had lung surgery in May.  Blood pressure elevated in ED.  Will send for priority CT scan and order Labetalol.    Exam: slurred speech, otherwise normal

## 2018-10-12 NOTE — ED ADULT TRIAGE NOTE - CHIEF COMPLAINT QUOTE
c/o feels like she had a stroke, symptoms started 2 days ago, left sided weakness and  left side of face feels numb

## 2018-10-12 NOTE — ED PROVIDER NOTE - FAMILY HISTORY
Family history of heart disease     Sibling  Still living? Yes, Estimated age: 61-70  Family history of hypertension, Age at diagnosis: Age Unknown     Sibling  Still living? Yes, Estimated age: 61-70  Family history of premature CAD, Age at diagnosis: 61-70

## 2018-10-13 DIAGNOSIS — I16.9 HYPERTENSIVE CRISIS, UNSPECIFIED: ICD-10-CM

## 2018-10-13 LAB
ANION GAP SERPL CALC-SCNC: 13 MMOL/L — SIGNIFICANT CHANGE UP (ref 5–17)
ANION GAP SERPL CALC-SCNC: 17 MMOL/L — SIGNIFICANT CHANGE UP (ref 5–17)
APTT BLD: 38.2 SEC — HIGH (ref 27.5–37.4)
BUN SERPL-MCNC: 15 MG/DL — SIGNIFICANT CHANGE UP (ref 8–20)
BUN SERPL-MCNC: 19 MG/DL — SIGNIFICANT CHANGE UP (ref 8–20)
CALCIUM SERPL-MCNC: 9 MG/DL — SIGNIFICANT CHANGE UP (ref 8.6–10.2)
CALCIUM SERPL-MCNC: 9.1 MG/DL — SIGNIFICANT CHANGE UP (ref 8.6–10.2)
CHLORIDE SERPL-SCNC: 94 MMOL/L — LOW (ref 98–107)
CHLORIDE SERPL-SCNC: 98 MMOL/L — SIGNIFICANT CHANGE UP (ref 98–107)
CO2 SERPL-SCNC: 24 MMOL/L — SIGNIFICANT CHANGE UP (ref 22–29)
CO2 SERPL-SCNC: 26 MMOL/L — SIGNIFICANT CHANGE UP (ref 22–29)
CREAT SERPL-MCNC: 0.78 MG/DL — SIGNIFICANT CHANGE UP (ref 0.5–1.3)
CREAT SERPL-MCNC: 1.11 MG/DL — SIGNIFICANT CHANGE UP (ref 0.5–1.3)
GLUCOSE SERPL-MCNC: 118 MG/DL — HIGH (ref 70–115)
GLUCOSE SERPL-MCNC: 132 MG/DL — HIGH (ref 70–115)
HCT VFR BLD CALC: 40.7 % — SIGNIFICANT CHANGE UP (ref 37–47)
HGB BLD-MCNC: 14 G/DL — SIGNIFICANT CHANGE UP (ref 12–16)
INR BLD: 1.24 RATIO — HIGH (ref 0.88–1.16)
MAGNESIUM SERPL-MCNC: 2.2 MG/DL — SIGNIFICANT CHANGE UP (ref 1.6–2.6)
MCHC RBC-ENTMCNC: 27.8 PG — SIGNIFICANT CHANGE UP (ref 27–31)
MCHC RBC-ENTMCNC: 34.4 G/DL — SIGNIFICANT CHANGE UP (ref 32–36)
MCV RBC AUTO: 80.8 FL — LOW (ref 81–99)
PHOSPHATE SERPL-MCNC: 4.7 MG/DL — SIGNIFICANT CHANGE UP (ref 2.4–4.7)
PLATELET # BLD AUTO: 412 K/UL — HIGH (ref 150–400)
POTASSIUM SERPL-MCNC: 2.7 MMOL/L — CRITICAL LOW (ref 3.5–5.3)
POTASSIUM SERPL-MCNC: 4.3 MMOL/L — SIGNIFICANT CHANGE UP (ref 3.5–5.3)
POTASSIUM SERPL-SCNC: 2.7 MMOL/L — CRITICAL LOW (ref 3.5–5.3)
POTASSIUM SERPL-SCNC: 4.3 MMOL/L — SIGNIFICANT CHANGE UP (ref 3.5–5.3)
PROTHROM AB SERPL-ACNC: 13.7 SEC — HIGH (ref 9.8–12.7)
RBC # BLD: 5.04 M/UL — SIGNIFICANT CHANGE UP (ref 4.4–5.2)
RBC # FLD: 13.8 % — SIGNIFICANT CHANGE UP (ref 11–15.6)
SODIUM SERPL-SCNC: 135 MMOL/L — SIGNIFICANT CHANGE UP (ref 135–145)
SODIUM SERPL-SCNC: 137 MMOL/L — SIGNIFICANT CHANGE UP (ref 135–145)
WBC # BLD: 13.3 K/UL — HIGH (ref 4.8–10.8)
WBC # FLD AUTO: 13.3 K/UL — HIGH (ref 4.8–10.8)

## 2018-10-13 PROCEDURE — 99233 SBSQ HOSP IP/OBS HIGH 50: CPT

## 2018-10-13 PROCEDURE — 99232 SBSQ HOSP IP/OBS MODERATE 35: CPT

## 2018-10-13 PROCEDURE — 99223 1ST HOSP IP/OBS HIGH 75: CPT

## 2018-10-13 PROCEDURE — 70450 CT HEAD/BRAIN W/O DYE: CPT | Mod: 26

## 2018-10-13 RX ORDER — POTASSIUM CHLORIDE 20 MEQ
10 PACKET (EA) ORAL
Qty: 0 | Refills: 0 | Status: COMPLETED | OUTPATIENT
Start: 2018-10-13 | End: 2018-10-13

## 2018-10-13 RX ORDER — POTASSIUM CHLORIDE 20 MEQ
40 PACKET (EA) ORAL EVERY 4 HOURS
Qty: 0 | Refills: 0 | Status: COMPLETED | OUTPATIENT
Start: 2018-10-13 | End: 2018-10-13

## 2018-10-13 RX ORDER — NICARDIPINE HYDROCHLORIDE 30 MG/1
5 CAPSULE, EXTENDED RELEASE ORAL
Qty: 40 | Refills: 0 | Status: DISCONTINUED | OUTPATIENT
Start: 2018-10-13 | End: 2018-10-13

## 2018-10-13 RX ORDER — LISINOPRIL 2.5 MG/1
20 TABLET ORAL DAILY
Qty: 0 | Refills: 0 | Status: DISCONTINUED | OUTPATIENT
Start: 2018-10-13 | End: 2018-10-18

## 2018-10-13 RX ADMIN — Medication 100 MILLIEQUIVALENT(S): at 09:55

## 2018-10-13 RX ADMIN — NICARDIPINE HYDROCHLORIDE 25 MG/HR: 30 CAPSULE, EXTENDED RELEASE ORAL at 05:58

## 2018-10-13 RX ADMIN — NICARDIPINE HYDROCHLORIDE 25 MG/HR: 30 CAPSULE, EXTENDED RELEASE ORAL at 08:05

## 2018-10-13 RX ADMIN — Medication 100 MILLIEQUIVALENT(S): at 08:05

## 2018-10-13 RX ADMIN — Medication 40 MILLIEQUIVALENT(S): at 13:35

## 2018-10-13 RX ADMIN — Medication 40 MILLIEQUIVALENT(S): at 09:55

## 2018-10-13 RX ADMIN — Medication 100 MILLIEQUIVALENT(S): at 11:54

## 2018-10-13 RX ADMIN — LISINOPRIL 20 MILLIGRAM(S): 2.5 TABLET ORAL at 08:08

## 2018-10-13 RX ADMIN — Medication 240 MILLIGRAM(S): at 21:15

## 2018-10-13 NOTE — PROGRESS NOTE ADULT - SUBJECTIVE AND OBJECTIVE BOX
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  She is being followed by NS.  On repeat Ct scan she has slight increase in hemorrhage although clinically has not had a change in her neurologic status.  Will get MRI of brain w/wo contrast as per NS suggestion today.  Pt with HTN crisis was weaned off cardene gtt this am and passed dysphagia evaluation and was started on PO lisinopril and PO cardizem for HR/BP control.  At this time she needs to remain off AC.  Neurology also following.  Will get MRI today and if no further increase in size of hemorrhage may consider moving pt to step down later.  Patient is a 68y old  Female who presents with a chief complaint of ICH/ hypertensive emergency (13 Oct 2018 07:51)      BRIEF HOSPITAL COURSE: as above    Events last 24 hours:  Pontine ICH, HTN crisis weaned off IV Cardene, passed dysphagia start PO diet/meds    PAST MEDICAL & SURGICAL HISTORY:  Endometriosis  Hypertension  Fibroid tumor  History of ovarian cyst      Review of Systems:  CONSTITUTIONAL: No fever, chills, or fatigue  EYES: No eye pain, visual disturbances, or discharge  ENMT:  slight slurring speech  NECK: No pain or stiffness  RESPIRATORY: No cough, wheezing, chills or hemoptysis; No shortness of breath  CARDIOVASCULAR: No chest pain, palpitations, dizziness, or leg swelling  GASTROINTESTINAL: No abdominal or epigastric pain. No nausea, vomiting, or hematemesis; No diarrhea or constipation. No melena or hematochezia.  GENITOURINARY: No dysuria, frequency, hematuria, or incontinence  NEUROLOGICAL: Left arm weakness but slightly better than yesterday  SKIN: No itching, burning, rashes, or lesions   MUSCULOSKELETAL: No joint pain or swelling; No muscle, back, or extremity pain  PSYCHIATRIC: No depression, anxiety, mood swings, or difficulty sleeping      Medications:    diltiazem    milliGRAM(s) Oral daily  lisinopril 20 milliGRAM(s) Oral daily    potassium chloride    Tablet ER 40 milliEquivalent(s) Oral every 4 hours    influenza   Vaccine 0.5 milliLiter(s) IntraMuscular onceICU Vital Signs Last 24 Hrs  T(C): 36.8 (13 Oct 2018 12:00), Max: 36.9 (13 Oct 2018 00:07)  T(F): 98.2 (13 Oct 2018 12:00), Max: 98.4 (13 Oct 2018 00:07)  HR: 74 (13 Oct 2018 12:00) (67 - 98)  BP: 141/65 (13 Oct 2018 12:00) (108/64 - 241/112)  BP(mean): 93 (13 Oct 2018 12:00) (83 - 110)  ABP: --  ABP(mean): --  RR: 30 (13 Oct 2018 12:00) (17 - 30)  SpO2: 96% (13 Oct 2018 12:00) (92% - 98%)    LABS:                        14.0   13.3  )-----------( 412      ( 13 Oct 2018 04:46 )             40.7     10-13    137  |  94<L>  |  15.0  ----------------------------<  118<H>  2.7<LL>   |  26.0  |  0.78    Ca    9.1      13 Oct 2018 04:46  Phos  4.7     10-13  Mg     2.2     10-13        CARDIAC MARKERS ( 12 Oct 2018 12:56 )  x     / <0.01 ng/mL / 45 U/L / x     / x          CAPILLARY BLOOD GLUCOSE        PT/INR - ( 13 Oct 2018 04:46 )   PT: 13.7 sec;   INR: 1.24 ratio         PTT - ( 13 Oct 2018 04:46 )  PTT:38.2 sec    CULTURES:      Physical Examination:    General: No acute distress.  Alert, oriented, interactive,     HEENT: Pupils equal, reactive to light.  Symmetric.    PULM: Clear to auscultation bilaterally, no significant sputum production    CVS: Regular rate and rhythm/currently in sinus rhythm, no murmurs, rubs, or gallops    ABD: Soft, nondistended, nontender, normoactive bowel sounds, no masses    EXT: No edema, nontender    SKIN: Warm and well perfused, no rashes noted.    RADIOLOGY: ***    CRITICAL CARE TIME SPENT: *** Pt is a 68 YOF h/o endometriosis, former 43packyr smoker quit 2017, recently diagnosed squamous cell lung CA/poorly differentiated, s/p VATS RLL wedge resection in 5/2018, had AFib with RVR while in hospital for VATS and was d/c home on Eliquis.  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  She is being followed by NS.  On repeat Ct scan she has slight increase in hemorrhage although clinically has not had a change in her neurologic status.  Will get MRI of brain w/wo contrast as per NS suggestion today.  Pt with HTN crisis was weaned off cardene gtt this am and passed dysphagia evaluation and was started on PO lisinopril and PO cardizem for HR/BP control.  At this time she needs to remain off AC.  Neurology also following.  Will get MRI today and if no further increase in size of hemorrhage may consider moving pt to step down later.  Patient is a 68y old  Female who presents with a chief complaint of ICH/ hypertensive emergency (13 Oct 2018 07:51)      BRIEF HOSPITAL COURSE: as above    Events last 24 hours:  Pontine ICH, HTN crisis weaned off IV Cardene, passed dysphagia start PO diet/meds    PAST MEDICAL & SURGICAL HISTORY:  Endometriosis  Hypertension  Fibroid tumor  History of ovarian cyst      Review of Systems:  CONSTITUTIONAL: No fever, chills, or fatigue  EYES: No eye pain, visual disturbances, or discharge  ENMT:  slight slurring speech  NECK: No pain or stiffness  RESPIRATORY: No cough, wheezing, chills or hemoptysis; No shortness of breath  CARDIOVASCULAR: No chest pain, palpitations, dizziness, or leg swelling  GASTROINTESTINAL: No abdominal or epigastric pain. No nausea, vomiting, or hematemesis; No diarrhea or constipation. No melena or hematochezia.  GENITOURINARY: No dysuria, frequency, hematuria, or incontinence  NEUROLOGICAL: Left arm weakness but slightly better than yesterday  SKIN: No itching, burning, rashes, or lesions   MUSCULOSKELETAL: No joint pain or swelling; No muscle, back, or extremity pain  PSYCHIATRIC: No depression, anxiety, mood swings, or difficulty sleeping      Medications:    diltiazem    milliGRAM(s) Oral daily  lisinopril 20 milliGRAM(s) Oral daily    potassium chloride    Tablet ER 40 milliEquivalent(s) Oral every 4 hours    influenza   Vaccine 0.5 milliLiter(s) IntraMuscular onceICU Vital Signs Last 24 Hrs  T(C): 36.8 (13 Oct 2018 12:00), Max: 36.9 (13 Oct 2018 00:07)  T(F): 98.2 (13 Oct 2018 12:00), Max: 98.4 (13 Oct 2018 00:07)  HR: 74 (13 Oct 2018 12:00) (67 - 98)  BP: 141/65 (13 Oct 2018 12:00) (108/64 - 241/112)  BP(mean): 93 (13 Oct 2018 12:00) (83 - 110)  ABP: --  ABP(mean): --  RR: 30 (13 Oct 2018 12:00) (17 - 30)  SpO2: 96% (13 Oct 2018 12:00) (92% - 98%)    LABS:                        14.0   13.3  )-----------( 412      ( 13 Oct 2018 04:46 )             40.7     10-13    137  |  94<L>  |  15.0  ----------------------------<  118<H>  2.7<LL>   |  26.0  |  0.78    Ca    9.1      13 Oct 2018 04:46  Phos  4.7     10-13  Mg     2.2     10-13        CARDIAC MARKERS ( 12 Oct 2018 12:56 )  x     / <0.01 ng/mL / 45 U/L / x     / x          CAPILLARY BLOOD GLUCOSE        PT/INR - ( 13 Oct 2018 04:46 )   PT: 13.7 sec;   INR: 1.24 ratio         PTT - ( 13 Oct 2018 04:46 )  PTT:38.2 sec    CULTURES:      Physical Examination:    General: No acute distress.  Alert, oriented, interactive,     HEENT: Pupils equal, reactive to light.  Symmetric.    PULM: Clear to auscultation bilaterally, no significant sputum production    CVS: Regular rate and rhythm/currently in sinus rhythm, no murmurs, rubs, or gallops    ABD: Soft, nondistended, nontender, normoactive bowel sounds, no masses    EXT: No edema, nontender    SKIN: Warm and well perfused, no rashes noted.    RADIOLOGY: images reviewed    CRITICAL CARE TIME SPENT: ***

## 2018-10-13 NOTE — SWALLOW BEDSIDE ASSESSMENT ADULT - SLP GENERAL OBSERVATIONS
Pt received asleep in bed, easily arousable to voice, 0x4, +dysarthria, Sp02 96-97% baseline and throughout on room air

## 2018-10-13 NOTE — PROGRESS NOTE ADULT - ATTENDING COMMENTS
I saw this patient independently and agree with the above.  I updated the patient and her sister at bedside.    She is unable to tolerate MRI without sedation, and given the recent hemorrhage, would defer administration of sedative for this purpose.    Repeated CTH instead, which did show stability in the hemorrhage.    Added lisinopril this AM, blood pressure goal now sbp 120-160; has been in range.    will likely benefit from mri, but defer today.    OK for transfer out of MICU.  Defer to neuro/neurosurg services q2 or q4 neuro checks.    Defer anticoagulation per Dr Rodas. I saw this patient independently and agree with the above.  I updated the patient and her sister at bedside.    She is unable to tolerate MRI without sedation, and given the recent hemorrhage, would defer administration of sedative for this purpose.    Repeated CTH instead, which did show stability in the hemorrhage.    Added lisinopril this AM, blood pressure goal now sbp 120-160; has been in range.    will likely benefit from mri, but defer today.    OK for transfer out of MICU.  Defer to neuro/neurosurg services q2 or q4 neuro checks.  Repeat BMP.    Defer anticoagulation per Dr Rodas.

## 2018-10-13 NOTE — SWALLOW BEDSIDE ASSESSMENT ADULT - SWALLOW EVAL: DIAGNOSIS
Mild oral dysphagia. Pharyngeal stage appears WFL with no overt s/s aspiration. Given location/nature of CVA, would suggest strict aspiration precautions and close monitoring of PO tolerance

## 2018-10-13 NOTE — PROGRESS NOTE ADULT - SUBJECTIVE AND OBJECTIVE BOX
INTERVAL EVENTS:  Patient is stable. BP controlled overnight. No new complaints. Denies headaches over vision changes.     Vital Signs Last 24 Hrs  T(C): 36.9 (13 Oct 2018 04:09), Max: 36.9 (13 Oct 2018 00:07)  T(F): 98.4 (13 Oct 2018 04:09), Max: 98.4 (13 Oct 2018 00:07)  HR: 69 (13 Oct 2018 06:20) (69 - 104)  BP: 149/66 (13 Oct 2018 06:20) (108/64 - 241/112)  BP(mean): 100 (13 Oct 2018 06:20) (86 - 110)  RR: 23 (13 Oct 2018 06:20) (17 - 28)  SpO2: 93% (13 Oct 2018 06:20) (92% - 98%)  PHYSICAL EXAM:  GENERAL: NAD, well-groomed, well-developed  HEAD:  Atraumatic, normocephalic  EYES: Conjunctiva and sclera clear  ENMT: moist mucous membranes, good dentition, no lesions  NECK: Supple, no JVD  MENTAL STATUS: AAO x3; Appropriately conversant without aphasia with high and low frequency objects; repetition is intact, good concentration, following simple commands  CRANIAL NERVES: PERRL; EOMI; no dysconjugate gaze, diminished L NLF with symmetric activation, facial sensation grossly intact to light touch b/l; mild dysarthria, tongue midline  MOTOR: strength 4+/5 prximal LUE, 4/5 distal LUE, 5-/5 LLE throughout, 5/5 RUE/RLE B/L  COORDINATION: + L Pronator drift; rapid alternating movements inhibited on the left; + b/l dysmetria L>R, neg central ataxia  SENSATION: grossly intact to light touch all extremities  MUSCLE STRETCH REFLEXES: no Anand's b/l; no clonus b/l  CHEST/LUNG: Clear to auscultation bilaterally  HEART: +S1/+S2; Regular rate and rhythm  ABDOMEN: Soft, nontender, nondistended  EXTREMITIES:  2+ peripheral pulses, no clubbing, cyanosis, or edema  SKIN: Warm, dry; no rashes or lesions      LABS:             14.0   13.3  )-----------( 412                   40.7     137  |  94<L>  |  15.0  ----------------------------<  118<H>  2.7<LL>   |  26.0  |  0.78    Ca    9.1      13 Oct 2018 04:46  Phos  4.7     10-13  Mg     2.2     10-13        RADIOLOGY & ADDITIONAL STUDIES:  CT Head No Cont (10.12.18 @ 13:15)  Impression: Small mid pontine hemorrhage. Diffuse atrophy and   microvascular disease consistent with age.    CT Head No Cont (10.12.18 @ 21:10)  IMPRESSION:   1. Area of high attenuation noted in the brainstem/richard as on prior study   earlier this evening. It is noted that this patient had a recent CTA of   the   brain and high attenuation may represent hemorrhage and/or parenchymal   staining   due to underlying area of abnormality-ischemic area/mass/underlying   vascular   malformation. I have requested results from recent CTA of neck and brain   embolization addendum followingreview.   2. Likely underlying microvascular ischemic disease.

## 2018-10-13 NOTE — SWALLOW BEDSIDE ASSESSMENT ADULT - SWALLOW EVAL: RECOMMENDED DIET
MECHANICAL SOFT WITH THIN LIQUIDS as tolerated; if any overt s/s aspiration, please d/c PO diet and resume NPO status

## 2018-10-13 NOTE — SWALLOW BEDSIDE ASSESSMENT ADULT - SLP PERTINENT HISTORY OF CURRENT PROBLEM
Pt was admitted with slurred speech, left sided weakness and found to have a small mid-pontine hemorrhage

## 2018-10-13 NOTE — SWALLOW BEDSIDE ASSESSMENT ADULT - ORAL PREPARATORY PHASE
Within functional limits Decreased mastication ability/slow mastication Decreased mastication ability

## 2018-10-13 NOTE — CONSULT NOTE ADULT - SUBJECTIVE AND OBJECTIVE BOX
[facility]                                     Neurology at [place]                                 [address]                                  [address]                                        [place], [place], [address]                                             [phone]    CC:   ICH- dysarthria/left side weakness  HPI:  The patient is a 68y Female who presented to ED with dysarthria being told by family members that she sounded drunk on the phone and hearing abnormalities.  She also noted left arm weakness.  this was sudden and moderate severeity.  She was hypertensive in the 240's per chart.  Imaging revealed pontine ICH.  She was admitted to MICU and neurology was consulted.    PAST MEDICAL & SURGICAL HISTORY:  Endometriosis  Hypertension  Fibroid tumor  History of ovarian cyst      MEDICATIONS  (STANDING):  diltiazem    milliGRAM(s) Oral daily  influenza   Vaccine 0.5 milliLiter(s) IntraMuscular once  lisinopril 20 milliGRAM(s) Oral daily  potassium chloride    Tablet ER 40 milliEquivalent(s) Oral every 4 hours    MEDICATIONS  (PRN):  none    Allergies    No Known Allergies    Intolerances  none      SOCIAL HISTORY:  former tob,   social alcohol   no drugs    FAMILY HISTORY:  Family history of premature CAD (Sibling)  Family history of hypertension (Sibling)  Family history of heart disease      ROS:  ROS: 14 point ROS negative other than what is present in HPI or below  as in HPI, left weakness and dysarthria    Vital Signs Last 24 Hrs  T(C): 36.8 (13 Oct 2018 12:00), Max: 36.9 (13 Oct 2018 00:07)  T(F): 98.2 (13 Oct 2018 12:00), Max: 98.4 (13 Oct 2018 00:07)  HR: 79 (13 Oct 2018 13:00) (67 - 98)  BP: 117/73 (13 Oct 2018 13:00) (108/64 - 194/74)  BP(mean): 89 (13 Oct 2018 13:00) (83 - 110)  RR: 29 (13 Oct 2018 13:00) (17 - 30)  SpO2: 98% (13 Oct 2018 13:00) (92% - 98%)      General: NAD    Detailed Neurologic Exam:    Mental status: The patient is awake and alert and has normal attention span.  The patient is fully oriented in 3 spheres. The patient is oriented to current events. The patient is able to name objects, follow commands, repeat sentences.    Cranial nerves: Pupils equal and react symmetrically to light. There is no visual field deficit to confrontation. Extraocular motion is full with no nystagmus. There is no ptosis. Facial sensation is intact. Facial musculature is asymmetric- flat left NLF. Palate elevates symmetrically. Shoulder shrug is normal. Tongue is midline.    Motor: There is normal bulk and tone.  There is no tremor.  Strength is 5/5 in the right arm and leg.   Strength is 4/5 in the left arm with drift and 4+/5 in leftleg.    Sensation: Intact to light touch and pin in 4 extremities    Reflexes: 2+ throughout and plantar responses are flexor right, extensor left.    Cerebellar: There is no dysmetria on finger to nose testing on right, mild dysmetria on left.    Gait : deferred    LABS:                         14.0   13.3  )-----------( 412      ( 13 Oct 2018 04:46 )             40.7       10-13    137  |  94<L>  |  15.0  ----------------------------<  118<H>  2.7<LL>   |  26.0  |  0.78    Ca    9.1      13 Oct 2018 04:46  Phos  4.7     10-13  Mg     2.2     10-13        PT/INR - ( 13 Oct 2018 04:46 )   PT: 13.7 sec;   INR: 1.24 ratio         PTT - ( 13 Oct 2018 04:46 )  PTT:38.2 sec    RADIOLOGY & ADDITIONAL STUDIES (independently reviewed unless otherwise noted):  CT head stable midline pontine ICH, no ischemic acute CVA or masses seen  (+) SVID/mild atrophy  CTA head/neck- 50 % left ICA stenosis.  Now COW stenosis, intracerebral AVM or aneurysm

## 2018-10-13 NOTE — CONSULT NOTE ADULT - ASSESSMENT
The patient is a 68y Female who is followed by neurology because of ICH    Pontine ICH  likely due to HTN  neurologically with minor deficits  - avoid anti platelet medications  - avoid anti coagulant medications  - control BP, avoid SBP>160    HTN  Control BP  -160 for now  will need improved management of BP post d/c    A fib  would not consider restart of an antiplatelet for 7-10 days and a/c for 10-14 days unless she has embolic event  can consider LARIAT or WATCHMAN  time frame will depend in some part to repeated imaging of ICH  rate control for now    will follow with you    Maged Rodas MD PhD   [id number]

## 2018-10-13 NOTE — SWALLOW BEDSIDE ASSESSMENT ADULT - ORAL PHASE
Within functional limits Delayed oral transit time Decreased anterior-posterior movement of the bolus/Delayed oral transit time

## 2018-10-14 DIAGNOSIS — J44.9 CHRONIC OBSTRUCTIVE PULMONARY DISEASE, UNSPECIFIED: ICD-10-CM

## 2018-10-14 LAB
ANION GAP SERPL CALC-SCNC: 11 MMOL/L — SIGNIFICANT CHANGE UP (ref 5–17)
BUN SERPL-MCNC: 18 MG/DL — SIGNIFICANT CHANGE UP (ref 8–20)
CALCIUM SERPL-MCNC: 9 MG/DL — SIGNIFICANT CHANGE UP (ref 8.6–10.2)
CHLORIDE SERPL-SCNC: 101 MMOL/L — SIGNIFICANT CHANGE UP (ref 98–107)
CO2 SERPL-SCNC: 25 MMOL/L — SIGNIFICANT CHANGE UP (ref 22–29)
CREAT SERPL-MCNC: 0.87 MG/DL — SIGNIFICANT CHANGE UP (ref 0.5–1.3)
GLUCOSE SERPL-MCNC: 90 MG/DL — SIGNIFICANT CHANGE UP (ref 70–115)
HCT VFR BLD CALC: 38.7 % — SIGNIFICANT CHANGE UP (ref 37–47)
HGB BLD-MCNC: 13 G/DL — SIGNIFICANT CHANGE UP (ref 12–16)
MAGNESIUM SERPL-MCNC: 2.4 MG/DL — SIGNIFICANT CHANGE UP (ref 1.6–2.6)
MCHC RBC-ENTMCNC: 28.3 PG — SIGNIFICANT CHANGE UP (ref 27–31)
MCHC RBC-ENTMCNC: 33.6 G/DL — SIGNIFICANT CHANGE UP (ref 32–36)
MCV RBC AUTO: 84.1 FL — SIGNIFICANT CHANGE UP (ref 81–99)
PHOSPHATE SERPL-MCNC: 4.5 MG/DL — SIGNIFICANT CHANGE UP (ref 2.4–4.7)
PLATELET # BLD AUTO: 349 K/UL — SIGNIFICANT CHANGE UP (ref 150–400)
POTASSIUM SERPL-MCNC: 4.3 MMOL/L — SIGNIFICANT CHANGE UP (ref 3.5–5.3)
POTASSIUM SERPL-SCNC: 4.3 MMOL/L — SIGNIFICANT CHANGE UP (ref 3.5–5.3)
RBC # BLD: 4.6 M/UL — SIGNIFICANT CHANGE UP (ref 4.4–5.2)
RBC # FLD: 14.2 % — SIGNIFICANT CHANGE UP (ref 11–15.6)
SODIUM SERPL-SCNC: 137 MMOL/L — SIGNIFICANT CHANGE UP (ref 135–145)
WBC # BLD: 11.5 K/UL — HIGH (ref 4.8–10.8)
WBC # FLD AUTO: 11.5 K/UL — HIGH (ref 4.8–10.8)

## 2018-10-14 PROCEDURE — 99232 SBSQ HOSP IP/OBS MODERATE 35: CPT

## 2018-10-14 RX ORDER — ALPRAZOLAM 0.25 MG
0.5 TABLET ORAL EVERY 6 HOURS
Qty: 0 | Refills: 0 | Status: DISCONTINUED | OUTPATIENT
Start: 2018-10-14 | End: 2018-10-18

## 2018-10-14 RX ORDER — BUDESONIDE, MICRONIZED 100 %
0.25 POWDER (GRAM) MISCELLANEOUS
Qty: 0 | Refills: 0 | Status: DISCONTINUED | OUTPATIENT
Start: 2018-10-14 | End: 2018-10-17

## 2018-10-14 RX ADMIN — Medication 0.5 MILLIGRAM(S): at 23:27

## 2018-10-14 RX ADMIN — LISINOPRIL 20 MILLIGRAM(S): 2.5 TABLET ORAL at 05:53

## 2018-10-14 RX ADMIN — Medication 0.25 MILLIGRAM(S): at 20:35

## 2018-10-14 RX ADMIN — Medication 240 MILLIGRAM(S): at 21:56

## 2018-10-14 RX ADMIN — Medication 0.5 MILLIGRAM(S): at 17:16

## 2018-10-14 NOTE — PROGRESS NOTE ADULT - SUBJECTIVE AND OBJECTIVE BOX
INTERVAL EVENTS:  Remains stable without overnight events. Patient is anxious about MRI and could not receive sedation. no new complaints.    Vital Signs Last 24 Hrs  T(C): 36.6 (14 Oct 2018 03:40), Max: 36.9 (13 Oct 2018 16:03)  T(F): 97.8 (14 Oct 2018 03:40), Max: 98.4 (13 Oct 2018 16:03)  HR: 56 (14 Oct 2018 05:00) (56 - 98)  BP: 136/61 (14 Oct 2018 05:00) (109/59 - 194/74)  BP(mean): 88 (14 Oct 2018 05:00) (78 - 106)  RR: 16 (14 Oct 2018 05:00) (14 - 30)  SpO2: 97% (14 Oct 2018 05:00) (92% - 98%)  PHYSICAL EXAM:  GENERAL: NAD, well-groomed, well-developed  HEAD:  Atraumatic, normocephalic  EYES: Conjunctiva and sclera clear  ENMT: moist mucous membranes, good dentition, no lesions  NECK: Supple, no JVD  MENTAL STATUS: AAO x3; Appropriately conversant without aphasia with high and low frequency objects; repetition is intact, good concentration, following simple commands  CRANIAL NERVES: PERRL; EOMI; no dysconjugate gaze, diminished L NLF with symmetric activation, facial sensation grossly intact to light touch b/l; mild dysarthria, tongue midline  MOTOR: strength 4+/5 LUE 5-/5 LLE throughout, 5/5 RUE/RLE   COORDINATION: + L Pronator drift; + b/l dysmetria  SENSATION: grossly intact to light touch all extremities  HEART: Bradycardic, regular rhythm  ABDOMEN: Soft, nontender, nondistended  EXTREMITIES:  2+ peripheral pulses, no clubbing, cyanosis, or edema  SKIN: Warm, dry; no rashes or lesions      LABS:             14.0   13.3  )-----------( 412             40.7     135  |  98  |  19.0  ----------------------------<  132<H>  4.3   |  24.0  |  1.11    Ca    9.0       Phos  4.7       Mg     2.2           RADIOLOGY & ADDITIONAL STUDIES:  CT Head No Cont (10.12.18 @ 13:15)  Impression: Small mid pontine hemorrhage. Diffuse atrophy and   microvascular disease consistent with age.    CT Head No Cont (10.12.18 @ 21:10)  IMPRESSION:   1. Area of high attenuation noted in the brainstem/richard as on prior study   earlier this evening. It is noted that this patient had a recent CTA of   the   brain and high attenuation may represent hemorrhage and/or parenchymal   staining   due to underlying area of abnormality-ischemic area/mass/underlying   vascular   malformation. I have requested results from recent CTA of neck and brain   embolization addendum followingreview.   2. Likely underlying microvascular ischemic disease.

## 2018-10-14 NOTE — PROGRESS NOTE ADULT - SUBJECTIVE AND OBJECTIVE BOX
SUBJECTIVE    REVIEW OF SYSTEMS    General: Not in any pain	    Skin/Breast: No rash  	  ENMT: No visual problems, no sore throat	    Respiratory and Thorax: No cough, No CP, No SOB  	  Cardiovascular: No CP, No palpitations    Gastrointestinal: No Abd pain, No N/V/D    Musculoskeletal: No Joint pain, No back pain	    Neurological: No headache    Psychiatric: No anxiety      OBJECTIVE    Vital Signs Last 24 Hrs  T(C): 36.4 (10-14-18 @ 11:52), Max: 36.9 (10-13-18 @ 16:03)  T(F): 97.6 (10-14-18 @ 11:52), Max: 98.4 (10-13-18 @ 16:03)  HR: 72 (10-14-18 @ 12:00) (56 - 79)  BP: 150/72 (10-14-18 @ 12:00) (109/59 - 162/72)  BP(mean): 103 (10-14-18 @ 12:00) (78 - 104)  RR: 20 (10-14-18 @ 12:00) (14 - 29)  SpO2: 96% (10-14-18 @ 12:00) (93% - 97%)    PHYSICAL EXAM:    Constitutional: Not in any distress    Eyes: No conjunctival injection    ENMT: No oral lesions    Neck: No nodes, no adenopathy    Back: Straight, no defects    Respiratory: clear b/l    Cardiovascular: RRR, no murmur    Gastrointestinal: soft, NT, ND    Extremities: No edema, no erythema    Neurological: mild left side weakness, mild dysarthria    Skin: No rash      MEDICATIONS  (STANDING):  diltiazem    milliGRAM(s) Oral daily  influenza   Vaccine 0.5 milliLiter(s) IntraMuscular once  lisinopril 20 milliGRAM(s) Oral daily    MEDICATIONS  (PRN):  ALPRAZolam 0.5 milliGRAM(s) Oral every 6 hours PRN anxiety                              13.0   11.5  )-----------( 349      ( 14 Oct 2018 07:27 )             38.7     14 Oct 2018 07:27    137    |  101    |  18.0   ----------------------------<  90     4.3     |  25.0   |  0.87     Ca    9.0        14 Oct 2018 07:27  Phos  4.5       14 Oct 2018 07:27  Mg     2.4       14 Oct 2018 07:27      Allergies    No Known Allergies    Intolerances

## 2018-10-14 NOTE — PROGRESS NOTE ADULT - SUBJECTIVE AND OBJECTIVE BOX
[facility]                                     [facility] [place]                                 [address]                                  [address]                                        [place], [place], [address]                                             [phone]    CC:   ICH- dysarthria/left side weakness  HPI:  The patient is a 68y Female who presented to ED with dysarthria being told by family members that she sounded drunk on the phone and hearing abnormalities.  She also noted left arm weakness.  this was sudden and moderate severeity.  She was hypertensive in the 240's per chart.  Imaging revealed pontine ICH.  She was admitted to MICU and neurology was consulted.    Interval history:  doing well.  mild dysarthria and left UE weakness    ROS neurology: Denies headache or dizziness. (+) left sided weakness. no numbness.  (+) dysarthria.  no language deficits. Denies diplopia/blurred vision.  Denies confusion    MEDICATIONS  (STANDING):  diltiazem    milliGRAM(s) Oral daily  influenza   Vaccine 0.5 milliLiter(s) IntraMuscular once  lisinopril 20 milliGRAM(s) Oral daily    MEDICATIONS  (PRN):  ALPRAZolam 0.5 milliGRAM(s) Oral every 6 hours PRN anxiety      Vital Signs Last 24 Hrs  T(C): 36.4 (14 Oct 2018 11:52), Max: 36.9 (13 Oct 2018 16:03)  T(F): 97.6 (14 Oct 2018 11:52), Max: 98.4 (13 Oct 2018 16:03)  HR: 72 (14 Oct 2018 12:00) (56 - 79)  BP: 150/72 (14 Oct 2018 12:00) (109/59 - 162/72)  BP(mean): 103 (14 Oct 2018 12:00) (78 - 104)  RR: 20 (14 Oct 2018 12:00) (14 - 29)  SpO2: 96% (14 Oct 2018 12:00) (93% - 97%)    Detailed Neurologic Exam:    Mental status: The patient is awake and alert and has normal attention span.  The patient is fully oriented in 3 spheres. The patient is oriented to current events. The patient is able to name objects, follow commands, repeat sentences.    Cranial nerves: Pupils equal and react symmetrically to light. There is no visual field deficit to confrontation. Extraocular motion is full with no nystagmus. There is no ptosis. Facial sensation is intact. Facial musculature is asymmetric- flat left NLF. Palate elevates symmetrically. Shoulder shrug is normal. Tongue is midline. Mild dsyarthria    Motor: There is normal bulk and tone.  There is no tremor.  Strength is 5/5 in the right arm and leg.   Strength is 4/5 in the left arm with drift and 4+/5 in left leg.    Sensation: Intact to light touch and pin in 4 extremities    Reflexes: 2+ throughout and plantar responses are flexor right, extensor left.    Cerebellar: There is no dysmetria on finger to nose testing on right, mild dysmetria on left- improving from yesterday  Gait : deferred    LABS:                                    13.0   11.5  )-----------( 349      ( 14 Oct 2018 07:27 )             38.7     10-14    137  |  101  |  18.0  ----------------------------<  90  4.3   |  25.0  |  0.87    Ca    9.0      14 Oct 2018 07:27  Phos  4.5     10-14  Mg     2.4     10-14        PT/INR - ( 13 Oct 2018 04:46 )   PT: 13.7 sec;   INR: 1.24 ratio         PTT - ( 13 Oct 2018 04:46 )  PTT:38.2 sec    RADIOLOGY & ADDITIONAL STUDIES (independently reviewed unless otherwise noted):  CT head stable midline pontine ICH, no ischemic acute CVA or masses seen  (+) SVID/mild atrophy  CTA head/neck- 50 % left ICA stenosis.  Now COW stenosis, intracerebral AVM or aneurysm

## 2018-10-14 NOTE — DIETITIAN INITIAL EVALUATION ADULT. - OTHER INFO
Pt s/p stroke on [facility] soft diet with thin liquids per SLP recommendations. Pt reports completing about 50% of lunch today and is tolerating the diet consistency well.

## 2018-10-14 NOTE — DIETITIAN INITIAL EVALUATION ADULT. - SIGNS/SYMPTOMS
as evidenced by results of swallow evaluation. as evidenced by results of swallow evaluation (mild oral dysphagia).

## 2018-10-15 PROCEDURE — 99222 1ST HOSP IP/OBS MODERATE 55: CPT

## 2018-10-15 PROCEDURE — 70553 MRI BRAIN STEM W/O & W/DYE: CPT | Mod: 26

## 2018-10-15 RX ORDER — ALPRAZOLAM 0.25 MG
0.5 TABLET ORAL ONCE
Qty: 0 | Refills: 0 | Status: DISCONTINUED | OUTPATIENT
Start: 2018-10-15 | End: 2018-10-15

## 2018-10-15 RX ORDER — METOPROLOL TARTRATE 50 MG
25 TABLET ORAL
Qty: 0 | Refills: 0 | Status: DISCONTINUED | OUTPATIENT
Start: 2018-10-15 | End: 2018-10-18

## 2018-10-15 RX ADMIN — LISINOPRIL 20 MILLIGRAM(S): 2.5 TABLET ORAL at 05:52

## 2018-10-15 RX ADMIN — Medication 240 MILLIGRAM(S): at 21:50

## 2018-10-15 RX ADMIN — Medication 25 MILLIGRAM(S): at 18:02

## 2018-10-15 RX ADMIN — Medication 25 MILLIGRAM(S): at 10:33

## 2018-10-15 RX ADMIN — Medication 0.5 MILLIGRAM(S): at 10:33

## 2018-10-15 RX ADMIN — Medication 0.25 MILLIGRAM(S): at 20:11

## 2018-10-15 RX ADMIN — Medication 0.25 MILLIGRAM(S): at 09:35

## 2018-10-15 NOTE — PHYSICAL THERAPY INITIAL EVALUATION ADULT - PERTINENT HX OF CURRENT PROBLEM, REHAB EVAL
pt presents to [facility] due to left sided weakness, acute pontine hemorrhage, HTN crisis

## 2018-10-15 NOTE — PHYSICAL THERAPY INITIAL EVALUATION ADULT - GAIT PATTERN USED, PT EVAL
left toe drag at times, unsteadiness throughout especially c turns, decreased gait velocity, verbal cues for sequencing

## 2018-10-15 NOTE — PHYSICAL THERAPY INITIAL EVALUATION ADULT - ADDITIONAL COMMENTS
owns no DME, lives in a condo, "some" steps to enter home however no stairs within home owns no DME, lives in a condo,1 step to enter home no rails however no stairs within home

## 2018-10-15 NOTE — OCCUPATIONAL THERAPY INITIAL EVALUATION ADULT - TRANSFER SAFETY CONCERNS NOTED: BED/CHAIR, REHAB EVAL
decreased safety awareness/losing balance/decreased balance during turns/decreased weight-shifting ability

## 2018-10-15 NOTE — PHYSICAL THERAPY INITIAL EVALUATION ADULT - OCCUPATION
Subjective   Sterling Graves is a 87 y.o. male.     History of Present Illness   reevaluation hypertension BPH mild osteoarthritis generalized senescence.  In interim hearing is become worse.  Mobility slightly lessened due to arthritic changes but states is remains active every day.  It is time for refill on medicines and lab work.   is maintaining weight watching diet family is becoming more involved.    The following portions of the patient's history were reviewed and updated as appropriate: allergies, current medications, past family history, past medical history, past social history, past surgical history and problem list.    Review of Systems   Constitutional: Negative for activity change, appetite change, fatigue and unexpected weight change.   HENT: Positive for hearing loss. Negative for trouble swallowing and voice change.    Eyes: Negative for redness and visual disturbance.   Respiratory: Negative for cough and wheezing.    Cardiovascular: Negative for chest pain and palpitations.   Gastrointestinal: Negative for abdominal pain, constipation, diarrhea, nausea and vomiting.   Genitourinary: Negative for urgency.   Musculoskeletal: Positive for arthralgias. Negative for joint swelling.   Neurological: Negative for syncope and headaches.   Hematological: Negative for adenopathy.   Psychiatric/Behavioral: Negative for sleep disturbance.       Objective   Physical Exam   Constitutional: He is oriented to person, place, and time. He appears well-developed.   HENT:   Head: Normocephalic.   Nose: Nose normal.   Eyes: Pupils are equal, round, and reactive to light.   Neck: Normal range of motion. No thyromegaly present.   Cardiovascular: Normal rate, regular rhythm, normal heart sounds and intact distal pulses.  Exam reveals no gallop and no friction rub.    No murmur heard.  Pulmonary/Chest: Breath sounds normal.   Abdominal: Soft. He exhibits no distension and no mass. There is no tenderness.    Musculoskeletal: Normal range of motion.   Finger approximately 5-6 seconds.  Does use a cane.  Gait slightly shuffling but minimally so   Neurological: He is alert and oriented to person, place, and time. He has normal reflexes.   Skin: Skin is warm and dry.   Psychiatric: His speech is normal and behavior is normal. His mood appears anxious.   Hard of hearing       Assessment/Plan   Sterling was seen today for hypertension.    Diagnoses and all orders for this visit:    Essential hypertension  -     Comprehensive Metabolic Panel  -     Magnesium  -     indapamide (LOZOL) 2.5 MG tablet; Take 1 tablet by mouth Daily.  -     metoprolol succinate XL (TOPROL-XL) 50 MG 24 hr tablet; Take 1 tablet by mouth Daily.  -     potassium chloride (K-DUR,KLOR-CON) 20 MEQ CR tablet; Take 1 tablet by mouth Daily.    Benign prostatic hyperplasia without lower urinary tract symptoms    Gastroesophageal reflux disease without esophagitis  -     esomeprazole (NEXIUM) 40 MG capsule; Take 1 capsule by mouth Daily.    Vitamin D deficiency  -     vitamin D (ERGOCALCIFEROL) 56571 units capsule capsule; 1 month    Other orders  -     dutasteride (AVODART) 0.5 MG capsule; Take 1 capsule by mouth Daily.        Counseled summer hydration fall prevention nutrition etc.  Recheck 6 months        [facility]

## 2018-10-15 NOTE — CONSULT NOTE ADULT - SUBJECTIVE AND OBJECTIVE BOX
68yF was admitted on 10-12 with slurred speech and left sided weakness. SBP was 240 and s/p Labetalol. A head CT showed a small mid pontine hemorrhage. CTA head was negative for AV malformation or aneurysm. MRI is pending.     Patient noting left sided weakness and balance difficulties.     REVIEW OF SYSTEMS  Constitutional - No fever, No weight loss, +fatigue  HEENT - No eye pain, No visual disturbances, No difficulty hearing, No tinnitus, No vertigo, No neck pain  Respiratory - No cough, No wheezing, No shortness of breath  Cardiovascular - No chest pain, No palpitations  Gastrointestinal - No abdominal pain, No nausea, No vomiting, No diarrhea, No constipation  Genitourinary - No dysuria, No frequency, No hematuria, No incontinence  Neurological - No headaches, No memory loss, +loss of strength, No numbness, No tremors  Skin - No itching, No rashes, No lesions   Endocrine - No temperature intolerance  Musculoskeletal - No joint pain, No joint swelling, No muscle pain  Psychiatric - No depression, No anxiety    VITALS  T(C): 36.6 (10-15-18 @ 05:00), Max: 36.9 (10-14-18 @ 16:00)  HR: 85 (10-15-18 @ 09:37) (71 - 90)  BP: 155/79 (10-15-18 @ 05:51) (142/69 - 179/86)  RR: 19 (10-14-18 @ 20:45) (19 - 27)  SpO2: 98% (10-15-18 @ 09:37) (95% - 99%)  Wt(kg): --    PAST MEDICAL & SURGICAL HISTORY  Endometriosis  Hypertension  Fibroid tumor  History of ovarian cyst      SOCIAL HISTORY  Smoking - Quit  EtOH - Denied   Drugs - Denied    FUNCTIONAL HISTORY  Independent    CURRENT FUNCTIONAL STATUS  Needs assist for standing    FAMILY HISTORY   Family history of premature CAD (Sibling)  Family history of hypertension (Sibling)  Family history of heart disease      RECENT LABS/IMAGING  CBC Full  -  ( 14 Oct 2018 07:27 )  WBC Count : 11.5 K/uL  Hemoglobin : 13.0 g/dL  Hematocrit : 38.7 %  Platelet Count - Automated : 349 K/uL  Mean Cell Volume : 84.1 fl  Mean Cell Hemoglobin : 28.3 pg  Mean Cell Hemoglobin Concentration : 33.6 g/dL  Auto Neutrophil # : x  Auto Lymphocyte # : x  Auto Monocyte # : x  Auto Eosinophil # : x  Auto Basophil # : x  Auto Neutrophil % : x  Auto Lymphocyte % : x  Auto Monocyte % : x  Auto Eosinophil % : x  Auto Basophil % : x    10-14    137  |  101  |  18.0  ----------------------------<  90  4.3   |  25.0  |  0.87    Ca    9.0      14 Oct 2018 07:27  Phos  4.5     10-14  Mg     2.4     10-14          ALLERGIES  No Known Allergies      MEDICATIONS   ALPRAZolam 0.5 milliGRAM(s) Oral every 6 hours PRN  buDESOnide   0.25 milliGRAM(s) Respule 0.25 milliGRAM(s) Inhalation two times a day  diltiazem    milliGRAM(s) Oral daily  influenza   Vaccine 0.5 milliLiter(s) IntraMuscular once  lisinopril 20 milliGRAM(s) Oral daily  metoprolol tartrate 25 milliGRAM(s) Oral two times a day      ----------------------------------------------------------------------------------------  PHYSICAL EXAM  Constitutional - NAD, Comfortable  HEENT - NCAT, EOMI  Neck - Supple, No limited ROM  Chest - Breathing comfortably, No wheezing  Cardiovascular - S1S2   Abdomen - Soft   Extremities -   No calf tenderness   Neurologic Exam -                    Cognitive - Awake, Alert, AAO to self, place, date, year, situation     Communication - Fluent, Mild dysarthria     Cranial Nerves - Mild left lip droop     Motor -                     LEFT    UE - ShAB 4+/5, EF 4/5, EE 4+/5,  4/5                    RIGHT UE - ShAB 5/5, EF 5/5, EE 5/5,  5/5                    LEFT    LE - HF 4/5, KE 5/5, DF 4/5, PF 4/5                    RIGHT LE - HF 5/5, KE 5/5, DF 5/5, PF 5/5        Sensory - Intact to LT  Psychiatric - Mood stable, Affect WNL  ----------------------------------------------------------------------------------------  ASSESSMENT/PLAN  68yFemale with functional deficits after hemorrhagic CVA related to HTN	  HTN - Lopressor, Cardizem  DVT PPX - SCDs  Rehab - PT and OT pending.  Recommend ACUTE inpatient rehabilitation for the functional deficits consisting of 3 hours of therapy/day & 24 hour RN/daily PMR physician for comorbid medical management. Will continue to follow for ongoing rehab needs and recommendations. Patient will be able to tolerate 3 hours a day.

## 2018-10-15 NOTE — OCCUPATIONAL THERAPY INITIAL EVALUATION ADULT - ADDITIONAL COMMENTS
Pt lives alone in a condo with 1 step to enter and 12 steps to bedroom and shower bathroom. Pt was independent prior

## 2018-10-15 NOTE — PHYSICAL THERAPY INITIAL EVALUATION ADULT - MANUAL MUSCLE TESTING RESULTS, REHAB EVAL
except left LE: hip 4-/5, knee 3+/5, ankles 3+/5, right LE: 4/5 throughout, see UE findings via OT eval/no strength deficits were identified

## 2018-10-15 NOTE — OCCUPATIONAL THERAPY INITIAL EVALUATION ADULT - TRANSFER SAFETY CONCERNS NOTED: SIT/STAND, REHAB EVAL
decreased weight-shifting ability/losing balance/decreased safety awareness/decreased balance during turns

## 2018-10-15 NOTE — OCCUPATIONAL THERAPY INITIAL EVALUATION ADULT - TRANSFER SAFETY CONCERNS NOTED: TOILET, REHAB EVAL
losing balance/decreased safety awareness/decreased weight-shifting ability/decreased balance during turns

## 2018-10-15 NOTE — PHYSICAL THERAPY INITIAL EVALUATION ADULT - CRITERIA FOR SKILLED THERAPEUTIC INTERVENTIONS
impairments found/rehab potential/therapy frequency/predicted duration of therapy intervention/anticipated discharge recommendation/functional limitations in following categories

## 2018-10-15 NOTE — PROGRESS NOTE ADULT - SUBJECTIVE AND OBJECTIVE BOX
INTERVAL HPI/OVERNIGHT EVENTS:  Admitted on 10/12/18  Pt presented with 2 days of weakness and dysarthria.  Today pt is sitting in bed her speech is slighly slurred. Pt denies headache and is following commands.   Pt has weakness of the left side.      MEDICATIONS  (STANDING):  buDESOnide   0.25 milliGRAM(s) Respule 0.25 milliGRAM(s) Inhalation two times a day  diltiazem    milliGRAM(s) Oral daily  influenza   Vaccine 0.5 milliLiter(s) IntraMuscular once  lisinopril 20 milliGRAM(s) Oral daily  metoprolol tartrate 25 milliGRAM(s) Oral two times a day    MEDICATIONS  (PRN):  ALPRAZolam 0.5 milliGRAM(s) Oral every 6 hours PRN anxiety  ALPRAZolam 0.5 milliGRAM(s) Oral once PRN on call for MRI      Allergies  No Known Allergies  Intolerances      Vital Signs Last 24 Hrs  T(C): 36.6 (15 Oct 2018 05:00), Max: 36.9 (14 Oct 2018 16:00)  T(F): 97.9 (15 Oct 2018 05:00), Max: 98.5 (14 Oct 2018 16:00)  HR: 85 (15 Oct 2018 09:37) (71 - 90)  BP: 155/79 (15 Oct 2018 05:51) (142/69 - 179/86)  BP(mean): 87 (14 Oct 2018 17:40) (87 - 111)  RR: 19 (14 Oct 2018 20:45) (19 - 27)  SpO2: 98% (15 Oct 2018 09:37) (95% - 99%)    PHYSICAL EXAM:    GENERAL: NAD, well-groomed, well-developed  HEAD:  Atraumatic, Normocephalic  EYES: EOMI, PERRLA, conjunctiva and sclera clear  ENMT: No tonsillar erythema, exudates, or enlargement; Moist mucous membranes, Good dentition, No lesions  NECK: Supple, No JVD,   NERVOUS SYSTEM:  Alert & Oriented X3, Good concentration; Motor Strength 5/5 right upper and lower extremities; Left sided 4/5 upper extrem DTRs 2+ intact and symmetric  CHEST/LUNG: Clear BS bilaterally; No rales, rhonchi, wheezing, or rubs  HEART: Regular rate and rhythm; No murmurs, rubs, or gallops  ABDOMEN: Soft, Nontender, Nondistended; Bowel sounds present  EXTREMITIES:  2+ Peripheral Pulses, No clubbing, cyanosis, or edema    LABS:                        13.0   11.5  )-----------( 349      ( 14 Oct 2018 07:27 )             38.7     10-14    137  |  101  |  18.0  ----------------------------<  90  4.3   |  25.0  |  0.87    Ca    9.0      14 Oct 2018 07:27  Phos  4.5     10-14  Mg     2.4     10-14      RADIOLOGY & ADDITIONAL TESTS:  < from: CT Head No Cont (10.13.18 @ 14:24) >   EXAM:  CT BRAIN                          PROCEDURE DATE:  10/13/2018    IMPRESSION:   Stable hemorrhage in the richard without hydrocephalus or new hemorrhage.  < end of copied text >    < from: CT Angio Neck w/ IV Cont (10.12.18 @ 15:25) >   EXAM:  CT ANGIO NECK (W)AW IC                         EXAM:  CT ANGIO BRAIN (W)AW IC                        < IMPRESSION:        No evidence of arterial venous malformation or aneurysm. Stable   pontine hemorrhage. 50% stenosis in the proximal left internal carotid   artery.  < end of copied text >

## 2018-10-15 NOTE — PROGRESS NOTE ADULT - SUBJECTIVE AND OBJECTIVE BOX
SUBJECTIVE    REVIEW OF SYSTEMS    General: Not in any pain	    Skin/Breast: No rash  	  ENMT: No visual problems, no sore throat	    Respiratory and Thorax: No cough, No CP, No SOB  	  Cardiovascular: No CP, No palpitations    Gastrointestinal: No Abd pain, No N/V/D    Musculoskeletal: No Joint pain, No back pain	    Neurological: No headache    Psychiatric: No anxiety      OBJECTIVE    Vital Signs Last 24 Hrs  T(C): 36.6 (10-15-18 @ 05:00), Max: 36.9 (10-14-18 @ 16:00)  T(F): 97.9 (10-15-18 @ 05:00), Max: 98.5 (10-14-18 @ 16:00)  HR: 81 (10-15-18 @ 05:51) (71 - 90)  BP: 155/79 (10-15-18 @ 05:51) (132/64 - 179/86)  BP(mean): 87 (10-14-18 @ 17:40) (87 - 111)  RR: 19 (10-14-18 @ 20:45) (19 - 27)  SpO2: 96% (10-14-18 @ 20:45) (95% - 99%)    PHYSICAL EXAM:    Constitutional: Not in any distress    Eyes: No conjunctival injection    ENMT: No oral lesions    Neck: No nodes, no adenopathy    Back: Straight, no defects    Respiratory: clear b/l    Cardiovascular: RRR, no murmur    Gastrointestinal: soft, NT, ND    Extremities: No edema, no erythema    Neurological: left side weakness    Skin: No rash      MEDICATIONS  (STANDING):  buDESOnide   0.25 milliGRAM(s) Respule 0.25 milliGRAM(s) Inhalation two times a day  diltiazem    milliGRAM(s) Oral daily  influenza   Vaccine 0.5 milliLiter(s) IntraMuscular once  lisinopril 20 milliGRAM(s) Oral daily    MEDICATIONS  (PRN):  ALPRAZolam 0.5 milliGRAM(s) Oral every 6 hours PRN anxiety  ALPRAZolam 0.5 milliGRAM(s) Oral once PRN on call for MRI                              13.0   11.5  )-----------( 349      ( 14 Oct 2018 07:27 )             38.7     14 Oct 2018 07:27    137    |  101    |  18.0   ----------------------------<  90     4.3     |  25.0   |  0.87     Ca    9.0        14 Oct 2018 07:27  Phos  4.5       14 Oct 2018 07:27  Mg     2.4       14 Oct 2018 07:27      Allergies    No Known Allergies    Intolerances

## 2018-10-16 ENCOUNTER — TRANSCRIPTION ENCOUNTER (OUTPATIENT)
Age: 68
End: 2018-10-16

## 2018-10-16 PROCEDURE — 99232 SBSQ HOSP IP/OBS MODERATE 35: CPT

## 2018-10-16 PROCEDURE — 99233 SBSQ HOSP IP/OBS HIGH 50: CPT

## 2018-10-16 RX ORDER — METOPROLOL TARTRATE 50 MG
1 TABLET ORAL
Qty: 0 | Refills: 0 | COMMUNITY
Start: 2018-10-16

## 2018-10-16 RX ORDER — HYDRALAZINE HCL 50 MG
25 TABLET ORAL EVERY 8 HOURS
Qty: 0 | Refills: 0 | Status: DISCONTINUED | OUTPATIENT
Start: 2018-10-16 | End: 2018-10-18

## 2018-10-16 RX ORDER — HYDRALAZINE HCL 50 MG
1 TABLET ORAL
Qty: 0 | Refills: 0 | COMMUNITY
Start: 2018-10-16

## 2018-10-16 RX ORDER — LISINOPRIL 2.5 MG/1
1 TABLET ORAL
Qty: 0 | Refills: 0 | COMMUNITY
Start: 2018-10-16

## 2018-10-16 RX ADMIN — Medication 25 MILLIGRAM(S): at 05:39

## 2018-10-16 RX ADMIN — Medication 25 MILLIGRAM(S): at 22:18

## 2018-10-16 RX ADMIN — LISINOPRIL 20 MILLIGRAM(S): 2.5 TABLET ORAL at 05:38

## 2018-10-16 RX ADMIN — Medication 0.25 MILLIGRAM(S): at 20:34

## 2018-10-16 RX ADMIN — Medication 25 MILLIGRAM(S): at 17:31

## 2018-10-16 RX ADMIN — Medication 240 MILLIGRAM(S): at 22:19

## 2018-10-16 RX ADMIN — Medication 0.25 MILLIGRAM(S): at 09:22

## 2018-10-16 NOTE — DISCHARGE NOTE ADULT - MEDICATION SUMMARY - MEDICATIONS TO TAKE
I will START or STAY ON the medications listed below when I get home from the hospital:    lisinopril 20 mg oral tablet  -- 1 tab(s) by mouth once a day  -- Indication: For Hypertension    DilTIAZem Hydrochloride  mg/24 hours oral capsule, extended release  -- 1 cap(s) by mouth once a day  -- Indication: For Hypertension    ALPRAZolam 0.5 mg oral tablet  -- 0.5 tab(s) by mouth once a day  -- Indication: For anxiety    metoprolol tartrate 25 mg oral tablet  -- 1 tab(s) by mouth 2 times a day  -- Indication: For Hypertension    budesonide-formoterol 160 mcg-4.5 mcg/inh inhalation aerosol  -- 160 microgram(s) inhaled 2 times a day  -- Indication: For COPD (chronic obstructive pulmonary disease)    hydrALAZINE 25 mg oral tablet  -- 1 tab(s) by mouth every 8 hours  -- Indication: For Hypertension    Vitamin B12 500 mcg oral tablet  -- 1 tab(s) by mouth once a day  -- Indication: For vitamin    Vitamin D3 1000 intl units oral capsule  -- 1 cap(s) by mouth once a day  -- Indication: For vitamin

## 2018-10-16 NOTE — DISCHARGE NOTE ADULT - PATIENT PORTAL LINK FT
You can access the [facility] Portal, offered by [facility], by registering with the following website: [email or web address]

## 2018-10-16 NOTE — PROGRESS NOTE ADULT - SUBJECTIVE AND OBJECTIVE BOX
SUBJECTIVE    REVIEW OF SYSTEMS    General: Not in any pain	    Skin/Breast: No rash  	  ENMT: No visual problems, no sore throat	    Respiratory and Thorax: No cough, No CP, No SOB  	  Cardiovascular: No CP, No palpitations    Gastrointestinal: No Abd pain, No N/V/D    Musculoskeletal: No Joint pain, No back pain	    Neurological: No headache    Psychiatric: No anxiety      OBJECTIVE    Vital Signs Last 24 Hrs  T(C): 36.4 (10-16-18 @ 16:59), Max: 36.9 (10-15-18 @ 21:46)  T(F): 97.5 (10-16-18 @ 16:59), Max: 98.4 (10-15-18 @ 21:46)  HR: 70 (10-16-18 @ 19:05) (67 - 75)  BP: 154/76 (10-16-18 @ 19:05) (154/76 - 176/82)  BP(mean): --  RR: 20 (10-16-18 @ 10:45) (18 - 20)  SpO2: 98% (10-16-18 @ 09:24) (98% - 100%)    PHYSICAL EXAM:    Constitutional: Not in any distress    Eyes: No conjunctival injection    ENMT: No oral lesions    Neck: No nodes, no adenopathy    Back: Straight, no defects    Respiratory: clear b/l    Cardiovascular: RRR, no murmur    Gastrointestinal: soft, NT, ND    Extremities: No edema, no erythema    Neurological: still with slurred speech and left side weakness    Skin: No rash      MEDICATIONS  (STANDING):  buDESOnide   0.25 milliGRAM(s) Respule 0.25 milliGRAM(s) Inhalation two times a day  diltiazem    milliGRAM(s) Oral daily  hydrALAZINE 25 milliGRAM(s) Oral every 8 hours  influenza   Vaccine 0.5 milliLiter(s) IntraMuscular once  lisinopril 20 milliGRAM(s) Oral daily  metoprolol tartrate 25 milliGRAM(s) Oral two times a day    MEDICATIONS  (PRN):  ALPRAZolam 0.5 milliGRAM(s) Oral every 6 hours PRN anxiety                Allergies    No Known Allergies    Intolerances

## 2018-10-16 NOTE — DISCHARGE NOTE ADULT - HOSPITAL COURSE
67 yo female w/hx of lung ca, a-fib on eliquis, asa with left side weakness and dysarthria  admitted for acute hemorrhagic cva  ct/mri brain shows mild-mod pontine bleed  anti coagulants/anti platelets d/c for now  hytension controlled  for acute inpt rehab

## 2018-10-16 NOTE — PROGRESS NOTE ADULT - SUBJECTIVE AND OBJECTIVE BOX
Patient in chair, wants to know results of MRI.   Briefly discussed and becomes tearful.  Discussed that patient is performing fairly well and that rehab will assist in her improving     FUNCTIONAL PROGRESS  10/15  Bed Mobility: Rolling/Turning:     · Level of Craig	minimum assist (75% patients effort)	    Bed Mobility: Supine to Sit:     · Level of Craig	moderate assist (50% patients effort)	    Transfer: Sit to Stand:     · Level of Craig	moderate assist (50% patients effort)	    Transfer: Stand to Sit:     · Level of Craig	moderate assist (50% patients effort)	    Gait Skills:     · Level of Craig	45 feet RW modA	    Gait Analysis:     · Gait Pattern Used	left toe drag at times, unsteadiness throughout especially c turns, decreased gait velocity, verbal cues for sequencing	    Stair Negotiation:     · Level of Craig	unable to perform; safety	      REVIEW OF SYSTEMS  Constitutional - No fever,  +fatigue  HEENT - No vertigo, No neck pain  Neurological - No headaches, No memory loss, +loss of strength, No numbness, No tremors  Skin - No rashes, No lesions   Musculoskeletal - No joint pain, No joint swelling, No muscle pain  Psychiatric - +depression, +anxiety    VITALS  T(C): 36.7 (10-16-18 @ 06:07), Max: 36.9 (10-15-18 @ 21:46)  HR: 70 (10-16-18 @ 09:24) (65 - 84)  BP: 161/79 (10-16-18 @ 06:07) (136/72 - 165/86)  RR: 18 (10-16-18 @ 06:07) (16 - 18)  SpO2: 98% (10-16-18 @ 09:24) (95% - 100%)  Wt(kg): --    MEDICATIONS   ALPRAZolam 0.5 milliGRAM(s) every 6 hours PRN  buDESOnide   0.25 milliGRAM(s) Respule 0.25 milliGRAM(s) two times a day  diltiazem    milliGRAM(s) daily  influenza   Vaccine 0.5 milliLiter(s) once  lisinopril 20 milliGRAM(s) daily  metoprolol tartrate 25 milliGRAM(s) two times a day      RECENT LABS/IMAGING          ----------------------------------------------------------------------------------------  PHYSICAL EXAM  Constitutional - NAD, Comfortable  Extremities -   No calf tenderness   Neurologic Exam -                    Communication - Fluent, Mild dysarthria     Cranial Nerves - Mild left lip droop     Motor -                     LEFT    UE - ShAB 4+/5, EF 4/5, EE 4+/5,  4/5                    RIGHT UE - ShAB 5/5, EF 5/5, EE 5/5,  5/5                    LEFT    LE - HF 4/5, KE 5/5, DF 4/5, PF 4/5                    RIGHT LE - HF 5/5, KE 5/5, DF 5/5, PF 5/5        Sensory - Intact to LT  Psychiatric - Mood anxious, depressed	  ----------------------------------------------------------------------------------------  ASSESSMENT/PLAN  68yFemale with functional deficits after hemorrhagic CVA related to HTN	  HTN - Lopressor, Cardizem, Lisinopril  Mood/Motor recovery - Add Prozac 10mg, Xanax PRN  DVT PPX - SCDs  Rehab - PT and OT pending.  Recommend ACUTE inpatient rehabilitation for the functional deficits consisting of 3 hours of therapy/day & 24 hour RN/daily PMR physician for comorbid medical management. Will continue to follow for ongoing rehab needs and recommendations. Patient will be able to tolerate 3 hours a day.

## 2018-10-16 NOTE — PROGRESS NOTE ADULT - SUBJECTIVE AND OBJECTIVE BOX
[facility]                                        [facility]                                 Clark Still, & Fuad                                  [address]                                        [place], [place], [address]                                             [phone]        CC: intracranial hemorrhage     HPI:   The patient is a 68y Female who presented to ED with dysarthria being told by family members that she sounded drunk on the phone and hearing abnormalities.  She also noted left arm weakness.  this was sudden and moderate severeity.  She was hypertensive in the 240's per chart.  Imaging revealed pontine ICH.    Interim history:  No new neurologic symptoms.    ROS:   Denies headache or dizziness.  Denies chest pain.  Denies shortness of breath.    MEDICATIONS  (STANDING):  buDESOnide   0.25 milliGRAM(s) Respule 0.25 milliGRAM(s) Inhalation two times a day  diltiazem    milliGRAM(s) Oral daily  influenza   Vaccine 0.5 milliLiter(s) IntraMuscular once  lisinopril 20 milliGRAM(s) Oral daily  metoprolol tartrate 25 milliGRAM(s) Oral two times a day      Vital Signs Last 24 Hrs  T(C): 36.7 (16 Oct 2018 06:07), Max: 36.9 (15 Oct 2018 21:46)  T(F): 98.1 (16 Oct 2018 06:07), Max: 98.4 (15 Oct 2018 21:46)  HR: 70 (16 Oct 2018 09:24) (65 - 84)  BP: 161/79 (16 Oct 2018 06:07) (136/72 - 165/86)  RR: 18 (16 Oct 2018 06:07) (16 - 18)  SpO2: 98% (16 Oct 2018 09:24) (95% - 100%)    Detailed Neurologic Exam:    Mental status: The patient is awake and alert. There is no aphasia. There is mild dysarthria.     Cranial nerves: Pupils equal and react symmetrically to light. There is no visual field deficit to confrontation. Extraocular motion is full with no nystagmus. There is no ptosis. Facial sensation is intact. Facial musculature is asymmetric with a slight depression of the left nasolabial fold. Palate elevates symmetrically. Shoulder shrug is normal. Tongue is midline.     Motor: There is normal bulk and tone.  There is no tremor.  Strength is 5/5 in the right arm and leg.   Strength is 4/5 in the left arm with drift and 4+/5 in left leg.    Sensation: Intact to light touch and pin in 4 extremities    Reflexes: 2+ throughout and plantar responses are flexor right, extensor left.    Cerebellar: There is no dysmetria on finger to nose testing on right, mild dysmetria on left- improving from yesterday      Rad:  Brain MRI reviewed. 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   This is improved compared to the prior head CT.   There are multiple tiny infarcts in the bilateral corona radiata.

## 2018-10-16 NOTE — DISCHARGE NOTE ADULT - CARE PLAN
Principal Discharge DX:	Hemorrhagic stroke  Goal:	recover from stroke  Assessment and plan of treatment:	low salt diet  Secondary Diagnosis:	Hypertensive crisis, unspecified  Secondary Diagnosis:	COPD (chronic obstructive pulmonary disease)

## 2018-10-16 NOTE — DISCHARGE NOTE ADULT - MEDICATION SUMMARY - MEDICATIONS TO STOP TAKING
I will STOP taking the medications listed below when I get home from the hospital:    aspirin 81 mg oral tablet  -- 1 tab(s) by mouth once a day    viviscal  -- 1 tab(s) by mouth 2 times a day    Eliquis 5 mg oral tablet  -- 1 tab(s) by mouth 2 times a day   -- Check with your doctor before becoming pregnant.  It is very important that you take or use this exactly as directed.  Do not skip doses or discontinue unless directed by your doctor.  Obtain medical advice before taking any non-prescription drugs as some may affect the action of this medication.

## 2018-10-17 PROCEDURE — 99232 SBSQ HOSP IP/OBS MODERATE 35: CPT

## 2018-10-17 PROCEDURE — 99233 SBSQ HOSP IP/OBS HIGH 50: CPT

## 2018-10-17 RX ORDER — BUDESONIDE AND FORMOTEROL FUMARATE DIHYDRATE 160; 4.5 UG/1; UG/1
160 AEROSOL RESPIRATORY (INHALATION)
Qty: 0 | Refills: 0 | COMMUNITY
Start: 2018-10-17

## 2018-10-17 RX ORDER — BUDESONIDE AND FORMOTEROL FUMARATE DIHYDRATE 160; 4.5 UG/1; UG/1
2 AEROSOL RESPIRATORY (INHALATION)
Qty: 0 | Refills: 0 | Status: DISCONTINUED | OUTPATIENT
Start: 2018-10-17 | End: 2018-10-18

## 2018-10-17 RX ORDER — ASPIRIN/CALCIUM CARB/MAGNESIUM 324 MG
1 TABLET ORAL
Qty: 0 | Refills: 0 | COMMUNITY

## 2018-10-17 RX ORDER — FLUOXETINE HCL 10 MG
10 CAPSULE ORAL DAILY
Qty: 0 | Refills: 0 | Status: DISCONTINUED | OUTPATIENT
Start: 2018-10-17 | End: 2018-10-18

## 2018-10-17 RX ADMIN — BUDESONIDE AND FORMOTEROL FUMARATE DIHYDRATE 2 PUFF(S): 160; 4.5 AEROSOL RESPIRATORY (INHALATION) at 21:38

## 2018-10-17 RX ADMIN — Medication 25 MILLIGRAM(S): at 21:52

## 2018-10-17 RX ADMIN — LISINOPRIL 20 MILLIGRAM(S): 2.5 TABLET ORAL at 06:11

## 2018-10-17 RX ADMIN — Medication 0.25 MILLIGRAM(S): at 20:20

## 2018-10-17 RX ADMIN — Medication 25 MILLIGRAM(S): at 17:00

## 2018-10-17 RX ADMIN — Medication 0.25 MILLIGRAM(S): at 09:07

## 2018-10-17 RX ADMIN — Medication 25 MILLIGRAM(S): at 06:11

## 2018-10-17 RX ADMIN — Medication 240 MILLIGRAM(S): at 21:52

## 2018-10-17 RX ADMIN — Medication 25 MILLIGRAM(S): at 13:36

## 2018-10-17 NOTE — PROGRESS NOTE ADULT - SUBJECTIVE AND OBJECTIVE BOX
SUBJECTIVE    REVIEW OF SYSTEMS    General: Not in any pain	    Skin/Breast: No rash  	  ENMT: No visual problems, no sore throat	    Respiratory and Thorax: No cough, No CP, No SOB  	  Cardiovascular: No CP, No palpitations    Gastrointestinal: No Abd pain, No N/V/D    Musculoskeletal: No Joint pain, No back pain	    Neurological: No headache    Psychiatric: No anxiety      OBJECTIVE    Vital Signs Last 24 Hrs  T(C): 36.9 (10-17-18 @ 21:30), Max: 36.9 (10-17-18 @ 21:30)  T(F): 98.4 (10-17-18 @ 21:30), Max: 98.4 (10-17-18 @ 21:30)  HR: 71 (10-17-18 @ 21:30) (68 - 83)  BP: 156/77 (10-17-18 @ 21:30) (136/76 - 166/89)  BP(mean): --  RR: 20 (10-17-18 @ 21:30) (18 - 20)  SpO2: 98% (10-17-18 @ 21:03) (97% - 98%)    PHYSICAL EXAM:    Constitutional: Not in any distress    Eyes: No conjunctival injection    ENMT: No oral lesions    Neck: No nodes, no adenopathy    Back: Straight, no defects    Respiratory: clear b/l    Cardiovascular: RRR, no murmur    Gastrointestinal: soft, NT, ND    Extremities: No edema, no erythema    Neurological: mild slurred speech and left side weakness    Skin: No rash      MEDICATIONS  (STANDING):  buDESOnide 160 MICROgram(s)/formoterol 4.5 MICROgram(s) Inhaler 2 Puff(s) Inhalation two times a day  diltiazem    milliGRAM(s) Oral daily  FLUoxetine 10 milliGRAM(s) Oral daily  hydrALAZINE 25 milliGRAM(s) Oral every 8 hours  influenza   Vaccine 0.5 milliLiter(s) IntraMuscular once  lisinopril 20 milliGRAM(s) Oral daily  metoprolol tartrate 25 milliGRAM(s) Oral two times a day    MEDICATIONS  (PRN):  ALPRAZolam 0.5 milliGRAM(s) Oral every 6 hours PRN anxiety                Allergies    No Known Allergies    Intolerances

## 2018-10-17 NOTE — PROGRESS NOTE ADULT - SUBJECTIVE AND OBJECTIVE BOX
[facility]                                        [facility] [place]                                 Clark Still, [address]                                  [address]                                        [place], [place], [address]                                             [phone]        CC: intracranial hemorrhage     HPI:   The patient is a 68y Female who presented to ED with dysarthria being told by family members that she sounded drunk on the phone and hearing abnormalities.  She also noted left arm weakness.  this was sudden and moderate severeity.  She was hypertensive in the 240's per chart.  Imaging revealed pontine ICH.    Interim history: No new neurologic symptoms. Mild left sided weakness    ROS neurology: Denies headache or dizziness. (+) left sided weakness. denies numbness.  Denies speech/language deficits. Denies diplopia/blurred vision.  Denies confusion    MEDICATIONS  (STANDING):  buDESOnide   0.25 milliGRAM(s) Respule 0.25 milliGRAM(s) Inhalation two times a day  buDESOnide 160 MICROgram(s)/formoterol 4.5 MICROgram(s) Inhaler 2 Puff(s) Inhalation two times a day  diltiazem    milliGRAM(s) Oral daily  hydrALAZINE 25 milliGRAM(s) Oral every 8 hours  influenza   Vaccine 0.5 milliLiter(s) IntraMuscular once  lisinopril 20 milliGRAM(s) Oral daily  metoprolol tartrate 25 milliGRAM(s) Oral two times a day    MEDICATIONS  (PRN):  ALPRAZolam 0.5 milliGRAM(s) Oral every 6 hours PRN anxiety      Vital Signs Last 24 Hrs  T(C): 36.7 (17 Oct 2018 11:39), Max: 36.8 (17 Oct 2018 05:16)  T(F): 98.1 (17 Oct 2018 11:39), Max: 98.3 (17 Oct 2018 05:16)  HR: 78 (17 Oct 2018 16:29) (68 - 83)  BP: 136/76 (17 Oct 2018 16:29) (136/76 - 176/82)  BP(mean): --  RR: 18 (17 Oct 2018 11:39) (18 - 20)  SpO2: 98% (17 Oct 2018 11:39) (97% - 98%)    Detailed Neurologic Exam:    Mental status: The patient is awake and alert. There is no aphasia. There is mild dysarthria.     Cranial nerves: Pupils equal and react symmetrically to light. There is no visual field deficit to confrontation. Extraocular motion is full with no nystagmus. There is no ptosis. Facial sensation is intact. Facial musculature is asymmetric with a slight depression of the left nasolabial fold. Palate elevates symmetrically. Shoulder shrug is normal. Tongue is midline.     Motor: There is normal bulk and tone.  There is no tremor.  Strength is 5/5 in the right arm and leg.   Strength is 4/5 in the left arm with drift and 4+/5 in left leg.    Sensation: Intact to light touch and pin in 4 extremities    Reflexes: 2+ throughout and plantar responses are flexor right, extensor left.    Cerebellar: There is no dysmetria on finger to nose testing on right, mild dysmetria on left- improving from yesterday    Rad:  Brain MRI reviewed. 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   This is improved compared to the prior head CT.   There are multiple tiny infarcts in the bilateral corona radiata.

## 2018-10-17 NOTE — PROGRESS NOTE ADULT - SUBJECTIVE AND OBJECTIVE BOX
Patient in bed, Dr. Mancini at bedside attempting to convince patient and talk to family member about going to [place].   Patient continues to have left sided deficits and inability to mobilize on her own.   Noted to have memory impairments about ongoing events.     FUNCTIONAL PROGRESS  10/16  Sit-Stand Transfer Training  Transfer Training Sit-to-Stand Transfer: Shelby  Transfer Training Stand-to-Sit Transfer: [place]    Gait Training  Gait Trainin'x2 RW modA   Gait Analysis: unsteadiness throughout requiring assist for safety, decreased left heel strike, decreased gait velocity, increased unsteadiness c turns, verbal cues to increase enviromental scanning     Bed Mobility  Bed Mobility Training Sit-to-Supine: minimum assist (75% patient effort);  1 person assist;  to assist LLE  Bed Mobility Training Supine-to-Sit: supervsion;  supervision;  verbal cues;  cues for safety    Sit-Stand Transfer Training  Transfer Training Sit-to-Stand Transfer: minimum assist (75% patient effort);  1 person assist;  rolling walker  Transfer Training Stand-to-Sit Transfer: minimum assist (75% patient effort);  1 person assist;  rolling walker  Sit-to-Stand Transfer Training Transfer Safety Analysis: decreased balance;  decreased sequencing ability;  impaired coordination;  impaired balance    Toilet Transfer Training  Transfer Training Toilet Transfer: minimum assist (75% patient effort);  1 person assist;  rolling walker;  with steadying to lower to toilet  Toilet Transfer Training Transfer Safety Analysis: decreased cognition;  impaired balance;  impaired coordination    Lower Body Dressing Training  Lower Body Dressing Training Assistance: minimum assist (75% patient effort);  1 person assist;  to karlie bilateral socks. assist to maintain balance when crossing left foot over right knee    Upper Body Dressing Training  Upper Body Dressing Training Assistance: supervsion;  to karlie gowns;  verbal cues    Grooming Training  Grooming Training Assistance: contact guard;  1 person assist;  for steadying to wash hands standing sinkside    Toilet Training  Toilet Training Assistance: minimum assist (75% patient effort);  1 person assist;  for steadying and safety      REVIEW OF SYSTEMS  Constitutional - No fever,  +fatigue  HEENT - No vertigo, No neck pain  Neurological - No headaches, +memory loss, +loss of strength, No numbness, No tremors  Skin - No rashes, No lesions   Musculoskeletal - No joint pain, No joint swelling, No muscle pain  Psychiatric - No depression, +anxiety    VITALS  T(C): 36.8 (10-17-18 @ 05:16), Max: 36.8 (10-17-18 @ 05:16)  HR: 68 (10-17-18 @ 09:19) (68 - 83)  BP: 162/80 (10-17-18 @ 05:16) (154/76 - 176/82)  RR: 20 (10-17-18 @ 05:16) (20 - 20)  SpO2: 97% (10-17-18 @ 05:16) (97% - 98%)  Wt(kg): --    MEDICATIONS   ALPRAZolam 0.5 milliGRAM(s) every 6 hours PRN  buDESOnide   0.25 milliGRAM(s) Respule 0.25 milliGRAM(s) two times a day  buDESOnide 160 MICROgram(s)/formoterol 4.5 MICROgram(s) Inhaler 2 Puff(s) two times a day  diltiazem    milliGRAM(s) daily  hydrALAZINE 25 milliGRAM(s) every 8 hours  influenza   Vaccine 0.5 milliLiter(s) once  lisinopril 20 milliGRAM(s) daily  metoprolol tartrate 25 milliGRAM(s) two times a day      RECENT LABS/IMAGING          ----------------------------------------------------------------------------------------  PHYSICAL EXAM  Constitutional - NAD, Comfortable  Extremities -   No calf tenderness   Neurologic Exam -                    Communication - Fluent, Mild dysarthria     Cranial Nerves - Left lip droop     Motor -                     LEFT    UE - ShAB 4+/5, EF 4/5, EE 4+/5,  4/5                    RIGHT UE - ShAB 5/5, EF 5/5, EE 5/5,  5/5                    LEFT    LE - HF 4/5, KE 5/5, DF 4/5, PF 4/5                    RIGHT LE - HF 5/5, KE 5/5, DF 5/5, PF 5/5        Sensory - Intact to LT  Psychiatric - Flat, anxious  ----------------------------------------------------------------------------------------  ASSESSMENT/PLAN  68yFemale with functional deficits after hemorrhagic CVA related to HTN	  HTN - Hydralazine, Lopressor, Cardizem, Lisinopril  Mood/Motor recovery - Add Prozac 10mg, Xanax PRN  DVT PPX - SCDs  Rehab - Recommend ACUTE inpatient rehabilitation for the functional deficits consisting of 3 hours of therapy/day & 24 hour RN/daily PMR physician for comorbid medical management. Will continue to follow for ongoing rehab needs and recommendations. Patient will be able to tolerate 3 hours a day.

## 2018-10-17 NOTE — PROGRESS NOTE ADULT - ASSESSMENT
68y Female who is followed by neurology because of ICH    Pontine ICH  Likely due to HTN  Avoid anti platelet medications  Avoid anti coagulant medications  Control BP, avoid SBP>160    HTN  Control BP    A fib  Currently in sinus rhythm   Would not consider restart of an antiplatelet for 7-10 days and a/c for 10-14 days from onset of ICH.  Can consider LARIAT or WATCHMAN  Time frame will depend in some part to repeated imaging of ICH.    Discharge planning.   Seen by PM&R.  Recommending acute rehab.     will follow with you    Maged Rodas MD PhD   [id number]
68y Female who is followed by neurology because of ICH    Pontine ICH  Likely due to HTN  Avoid anti platelet medications  Avoid anti coagulant medications  Control BP, avoid SBP>160    HTN  Control BP    A fib  Currently in sinus rhythm   Would not consider restart of an antiplatelet for 7-10 days and a/c for 10-14 days.  Can consider LARIAT or WATCHMAN  Time frame will depend in some part to repeated imaging of ICH.    Discharge planning.   Seen by PM&R.  Recommending acute rehab.     Case discussed with Dr Mancini.
68yf s/p left sided weakness.   CT of the brain pos for a hemorrhage in the richard.  Angio of the brain 50% stenosis in the proximal left internal carotid.    Plan   1. MRi of the brain with and without HERON  2. Physical therapy and occupational therapy.  3. Speech therapy pt on dysphagia diet will have pt reevaluated for upgrade of diet.   4. Rehab planned  5. Will follow up on MRI yet, at this time it no Neurosurgical intervention needed.
69y/o female with HTN and a.fib on Eliquis last dose this morning who presents with 48 hours of LUE>LLE weakness and mild dysarthria. Presenting BP >240mmHg systolic and HCT with small mid pontine hemorrhage.  - CTA H/N unremarkable for aneurysm of AVM. 50% ICA stenosis noted  - Repeat HCT stable    PLAN:  - Pending MRI brain w/wo  - Strict BP control goal <160mmHg  - HOB30  - Hold ACT/APT  - Discussed with attending
69y/o female with HTN and a.fib on Eliquis last dose this morning who presents with 48 hours of LUE>LLE weakness and mild dysarthria. Presenting BP >240mmHg systolic and HCT with small mid pontine hemorrhage.  - CTA H/N unremarkable for aneurysm of AVM. 50% ICA stenosis noted  - Repeat HCT stable    PLAN:  - q1h neurochecks xtotal of 24hrs  - Pending MRI brain w/wo  - Strict BP control goal <160mmHg - cardene drip started  - HOB30  - Hold ACT/APT  - Discussed with attending
The patient is a 68y Female who is followed by neurology because of ICH    Pontine ICH  likely due to HTN  neurologically with minor deficits  - avoid anti platelet medications  - avoid anti coagulant medications  - control BP, avoid SBP>160  Await MRI brain    HTN  Control BP  -160 for now  will need improved management of BP post d/c    A fib  would not consider restart of an antiplatelet for 7-10 days and a/c for 10-14 days unless she has embolic event  can consider LARIAT or WATCHMAN  time frame will depend in some part to repeated imaging of ICH  rate control for now    will follow with you    OK from neuro for transfer to [address] [place] [address] step down with q4H neuro checks.  d/w Dr. Magdalene Rodas MD PhD   [id number]
68 YOF with HTN crisis, ICH of richard, left sided weakness

## 2018-10-18 ENCOUNTER — INPATIENT (INPATIENT)
Facility: HOSPITAL | Age: 68
LOS: 14 days | Discharge: HOME | DRG: 949 | End: 2018-11-02
Attending: PSYCHIATRY & NEUROLOGY | Admitting: SPECIALIST
Payer: MEDICARE

## 2018-10-18 VITALS
RESPIRATION RATE: 20 BRPM | DIASTOLIC BLOOD PRESSURE: 78 MMHG | TEMPERATURE: 98 F | SYSTOLIC BLOOD PRESSURE: 136 MMHG | HEART RATE: 61 BPM

## 2018-10-18 VITALS
OXYGEN SATURATION: 98 % | DIASTOLIC BLOOD PRESSURE: 90 MMHG | HEART RATE: 62 BPM | RESPIRATION RATE: 16 BRPM | SYSTOLIC BLOOD PRESSURE: 160 MMHG | TEMPERATURE: 98 F

## 2018-10-18 DIAGNOSIS — Z87.42 PERSONAL HISTORY OF OTHER DISEASES OF THE FEMALE GENITAL TRACT: Chronic | ICD-10-CM

## 2018-10-18 DIAGNOSIS — I63.9 CEREBRAL INFARCTION, UNSPECIFIED: ICD-10-CM

## 2018-10-18 DIAGNOSIS — I48.91 UNSPECIFIED ATRIAL FIBRILLATION: ICD-10-CM

## 2018-10-18 DIAGNOSIS — R45.86 EMOTIONAL LABILITY: ICD-10-CM

## 2018-10-18 DIAGNOSIS — I10 ESSENTIAL (PRIMARY) HYPERTENSION: ICD-10-CM

## 2018-10-18 DIAGNOSIS — D25.9 LEIOMYOMA OF UTERUS, UNSPECIFIED: Chronic | ICD-10-CM

## 2018-10-18 DIAGNOSIS — C34.90 MALIGNANT NEOPLASM OF UNSPECIFIED PART OF UNSPECIFIED BRONCHUS OR LUNG: ICD-10-CM

## 2018-10-18 PROCEDURE — 99223 1ST HOSP IP/OBS HIGH 75: CPT | Mod: GC,AI

## 2018-10-18 PROCEDURE — 90792 PSYCH DIAG EVAL W/MED SRVCS: CPT

## 2018-10-18 PROCEDURE — 99232 SBSQ HOSP IP/OBS MODERATE 35: CPT

## 2018-10-18 RX ORDER — ALPRAZOLAM 0.25 MG
0.25 TABLET ORAL AT BEDTIME
Qty: 0 | Refills: 0 | Status: DISCONTINUED | OUTPATIENT
Start: 2018-10-18 | End: 2018-10-25

## 2018-10-18 RX ORDER — DILTIAZEM HCL 120 MG
240 CAPSULE, EXT RELEASE 24 HR ORAL
Qty: 0 | Refills: 0 | Status: DISCONTINUED | OUTPATIENT
Start: 2018-10-18 | End: 2018-11-02

## 2018-10-18 RX ORDER — METOPROLOL TARTRATE 50 MG
25 TABLET ORAL
Qty: 0 | Refills: 0 | Status: DISCONTINUED | OUTPATIENT
Start: 2018-10-18 | End: 2018-11-02

## 2018-10-18 RX ORDER — DOCUSATE SODIUM 100 MG
100 CAPSULE ORAL
Qty: 0 | Refills: 0 | Status: DISCONTINUED | OUTPATIENT
Start: 2018-10-18 | End: 2018-10-19

## 2018-10-18 RX ORDER — LISINOPRIL 2.5 MG/1
20 TABLET ORAL DAILY
Qty: 0 | Refills: 0 | Status: DISCONTINUED | OUTPATIENT
Start: 2018-10-18 | End: 2018-10-31

## 2018-10-18 RX ORDER — DILTIAZEM HCL 120 MG
240 CAPSULE, EXT RELEASE 24 HR ORAL DAILY
Qty: 0 | Refills: 0 | Status: DISCONTINUED | OUTPATIENT
Start: 2018-10-18 | End: 2018-10-18

## 2018-10-18 RX ORDER — ESCITALOPRAM OXALATE 10 MG/1
5 TABLET, FILM COATED ORAL DAILY
Qty: 0 | Refills: 0 | Status: DISCONTINUED | OUTPATIENT
Start: 2018-10-19 | End: 2018-10-24

## 2018-10-18 RX ORDER — ALPRAZOLAM 0.25 MG
0.25 TABLET ORAL EVERY 8 HOURS
Qty: 0 | Refills: 0 | Status: DISCONTINUED | OUTPATIENT
Start: 2018-10-18 | End: 2018-10-24

## 2018-10-18 RX ORDER — HYDRALAZINE HCL 50 MG
25 TABLET ORAL EVERY 8 HOURS
Qty: 0 | Refills: 0 | Status: DISCONTINUED | OUTPATIENT
Start: 2018-10-18 | End: 2018-10-29

## 2018-10-18 RX ADMIN — Medication 25 MILLIGRAM(S): at 17:50

## 2018-10-18 RX ADMIN — LISINOPRIL 20 MILLIGRAM(S): 2.5 TABLET ORAL at 05:38

## 2018-10-18 RX ADMIN — Medication 240 MILLIGRAM(S): at 21:50

## 2018-10-18 RX ADMIN — Medication 0.25 MILLIGRAM(S): at 17:53

## 2018-10-18 RX ADMIN — Medication 25 MILLIGRAM(S): at 05:38

## 2018-10-18 RX ADMIN — Medication 100 MILLIGRAM(S): at 17:50

## 2018-10-18 RX ADMIN — BUDESONIDE AND FORMOTEROL FUMARATE DIHYDRATE 2 PUFF(S): 160; 4.5 AEROSOL RESPIRATORY (INHALATION) at 08:05

## 2018-10-18 RX ADMIN — Medication 0.25 MILLIGRAM(S): at 21:50

## 2018-10-18 RX ADMIN — Medication 25 MILLIGRAM(S): at 21:50

## 2018-10-18 NOTE — CONSULT NOTE ADULT - SUBJECTIVE AND OBJECTIVE BOX
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 HEALTH ISSUES - PROBLEM Dx:  Labile mood, Lung cancer, Hypertension, Atrial fibrillation, CVA (cerebral vascular accident).    Chief Complaint: " I'm scared."    History of Present Illness:  69 yo female ex smoker w/hx of lung ca (5/18 VATs/lobectomy), HTN and Afib on Eliquis and ASA presents to [facility] with sudden onset left side weakness and dysarthria. CT head shows acute pontine ICH. CTA head neg stenosis R, Left ICA 50%. . Eliquis held. Hypertensive on admission, BP>200. No neurosurgical intervention. Seen by neurology. BP control. Resume ASA/Eliquis 10-14 days per Neuro plan. Stable for acute inpt rehab. (18 Oct 2018 10:46)    Symptoms and concerns-Patient presents supine in bed, pleasant, co-operative with interview. Tearful during conversation "my blood pressure was high, I don't know why, I take my medication as I was supposed to."  Anxious over current state of health and fear of residual disability.  "How will I manage?" Reports poor sleep when hospitalized at [facility] but attributes it partially to being in a room by the nurses station.  "This is the first time I have cried since my lung surgery."  Describes fear of ability to live the life she had including traveling " I have a trip planned to go to [facility] with a friend."  " My niece is getting engaged tonight."  " I live in [place] 6 mos out of the year.  Will I be able to keep doing that?"                Premorbid functioning/ status-patient was fully independent prior to Lung surgery, working as a  in [place], living there for summer and traveling back to [facility] when summer over.  Reports to have used Xanax in past for mild anxiousness & insomnia, prescribed by her primary MD with good effect.  Denies having had psychiatric care or counseling for any reason.       Psychiatric Review of Systems:  Mood: dysphoric, anxious  Sleep Disturbance-including insomnia  Anxiety-including worries related to current state of health with recent hospitalization and potential for residual functional impairment.      MEDICATIONS  (STANDING):  diltiazem    milliGRAM(s) Oral daily  docusate sodium 100 milliGRAM(s) Oral two times a day  hydrALAZINE 25 milliGRAM(s) Oral every 8 hours  lisinopril 20 milliGRAM(s) Oral daily  metoprolol tartrate 25 milliGRAM(s) Oral two times a day    Allergies  No Known Allergies    PAST MEDICAL & SURGICAL HISTORY:  Lung cancer  Atrial fibrillation  Endometriosis  Hypertension  Fibroid tumor  History of ovarian cyst    Past Psychiatric History:  Reports to have been prescribed Xanax in past for anxiousness & insomnia by primary MD.  Denies inpatient psychiatric treatment or ever having seen a psychiatrist or therapist in the past.   Suicidality/ Aggression-denies SI/SA and or plan.  No suicidal history.     Substance Use History:  -tobacco-history of cigarette smoking quit 1 year ago    Social and Personal History:  female, no children, lives alone.  One sister and one brother, mother alive 93yo.  Works as a  in [place] in summer months.   Hobbies/ Activities of Daily Living-traveling   Legal, Trauma, Violence background-denies    Family History: denies family history of anxiety, depression or other mental health diagnoses.    Family history of premature CAD (Sibling)  Family history of hypertension (Sibling)  Family history of heart disease    T(C): 36.4 (10-18-18 @ 11:34), Max: 36.9 (10-17-18 @ 21:30)  HR: 61 (10-18-18 @ 11:34) (61 - 71)  BP: 136/78 (10-18-18 @ 11:34) (136/78 - 156/77)  RR: 20 (10-18-18 @ 11:34) (20 - 20)  SpO2: 96% (10-18-18 @ 08:05) (96% - 98%)  Wt(kg): --    Mental Status Examination:  General Appearance-blonde haired female wearing glasses.  -hygiene/grooming-good.  Behavior-pleasant, co-operative  -eye contact-good.  Speech-aphasic  Thought Process-logical  Thought Content-focused on concerns related to medical issues/hospitalization, potential to miss out on social activities planned in her life.   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  Lexapro 5 mg po daily.      Hospital course Follow-up  Will continue to follow with you. 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 HEALTH ISSUES - PROBLEM Dx:  Labile mood, Lung cancer, Hypertension, Atrial fibrillation, CVA (cerebral vascular accident).    Chief Complaint: "I'm scared."    History of Present Illness:  69 yo female ex smoker w/hx of lung ca (5/18 VATs/lobectomy), HTN and Afib on Eliquis and ASA presents to [facility] with sudden onset left side weakness and dysarthria. CT head shows acute pontine ICH. CTA head neg stenosis R, Left ICA 50%. . Eliquis held. Hypertensive on admission, BP>200. No neurosurgical intervention. Seen by neurology. BP control. Resume ASA/Eliquis 10-14 days per Neuro plan. Stable for acute inpt rehab. (18 Oct 2018 10:46)    Symptoms and concerns-Patient presents supine in bed, pleasant, co-operative with interview. Tearful during conversation "my blood pressure was high, I don't know why, I take my medication as I was supposed to."  Anxious over current state of health and fear of residual disability.  "How will I manage?" Reports poor sleep when hospitalized at [facility] but attributes it partially to being in a room by the nurses station.  "This is the first time I have cried since my lung surgery."  Describes fear of ability to live the life she had including traveling " I have a trip planned to go to [facility] with a friend."  " My niece is getting engaged tonight."  " I live in [place] 6 mos out of the year.  Will I be able to keep doing that?"                Premorbid functioning/ status-patient was fully independent prior to Lung surgery, working as a  in [place], living there for summer and traveling back to [facility] when summer over.  Reports to have used Xanax in past for mild anxiousness & insomnia, prescribed by her primary MD with good effect.  Denies having had psychiatric care or counseling for any reason.       Psychiatric Review of Systems:  Mood: dysphoric, anxious  Sleep Disturbance-including insomnia  Anxiety-including worries related to current state of health with recent hospitalization and potential for residual functional impairment.      MEDICATIONS  (STANDING):  diltiazem    milliGRAM(s) Oral daily  docusate sodium 100 milliGRAM(s) Oral two times a day  hydrALAZINE 25 milliGRAM(s) Oral every 8 hours  lisinopril 20 milliGRAM(s) Oral daily  metoprolol tartrate 25 milliGRAM(s) Oral two times a day    Allergies  No Known Allergies    PAST MEDICAL & SURGICAL HISTORY:  Lung cancer  Atrial fibrillation  Endometriosis  Hypertension  Fibroid tumor  History of ovarian cyst    Past Psychiatric History:  Reports to have been prescribed Xanax in past for anxiousness & insomnia by primary MD.  Denies inpatient psychiatric treatment or ever having seen a psychiatrist or therapist in the past.   Suicidality/ Aggression-denies SI/SA and or plan.  No suicidal history.     Substance Use History:  -tobacco-history of cigarette smoking quit 1 year ago    Social and Personal History:  female, no children, lives alone.  One sister and one brother, mother alive 91yo.  Works as a  in [place] in summer months.   Hobbies/ Activities of Daily Living-traveling   Legal, Trauma, Violence background-denies    Family History: denies family history of anxiety, depression or other mental health diagnoses.    Family history of premature CAD (Sibling)  Family history of hypertension (Sibling)  Family history of heart disease    T(C): 36.4 (10-18-18 @ 11:34), Max: 36.9 (10-17-18 @ 21:30)  HR: 61 (10-18-18 @ 11:34) (61 - 71)  BP: 136/78 (10-18-18 @ 11:34) (136/78 - 156/77)  RR: 20 (10-18-18 @ 11:34) (20 - 20)  SpO2: 96% (10-18-18 @ 08:05) (96% - 98%)  Wt(kg): --    Mental Status Examination:  General Appearance-blonde haired female wearing glasses.  -hygiene/grooming-good.  Behavior-pleasant, co-operative  -eye contact-good.  Speech-aphasic  Thought Process-logical  Thought Content-focused on concerns related to medical issues/hospitalization, potential to miss out on social activities planned in her life.   Mood-dysphoric, anxious  Affect-congruent  Cognition-alert, oriented x 3  -attention-grossly normal  -recall-grossly normal  Impulse Control-not impaired  Insight/Judgment-not impaired    Imaging-   EXAM:  MR BRAIN WAW IC                          PROCEDURE DATE:  10/15/2018      INTERPRETATION:  CLINICAL HISTORY: Pontine hemorrhage, history of lung   cancer    COMPARISON: CT head dated 10/13/2018    TECHNIQUE: MRI brain: Multiplanar, multisequence MR imaging of the brain   are obtained with and without the administration of 7.5 cc intravenous   Gadavist contrast.     FINDINGS:    1.3 cm pontine hemorrhage with subacute products of blood is   demonstrated. Moderate edema in the richard. There are nonspecific cystic   lesion in the anterior left frontal lobe on image 10, series 6, without   enhancement.    There are multiple tiny foci of diffusion hyperintensity some with low   ADC signal in the bilateral corona radiata on image 24, series 4,   compatible with acute/subacute tiny infarcts.     Scattered periventricular and subcortical white matter T2 /FLAIR   hyperintensities are seen without mass effect, nonspecific, likely   representing moderate to severe chronic microvascular changes.    Normal T2 flow-voids are seen within  the intracranial vasculature. The   lateral ventricles and cortical sulci are age-appropriate in size and   configuration. There is no other mass, mass effect, or extra-axial fluid   collection. There is no other susceptibility artifact to suggest   hemorrhage. Midline structures are normal.     The patient is status post bilateral ocular lens replacement surgery. The   visualized paranasal sinuses, mastoid air cells and orbits are otherwise   unremarkable.      IMPRESSION: Improving/resolving moderate-sized pontine hemorrhage as   compared to the prior head CT. Multiple tiny acute/subacute bilateral   corona radiata infarcts.      SHERRI BLEVINS M.D., ATTENDING RADIOLOGIST  This document has been electronically signed. 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 HOld for SBP less than 100mm/hg, HR less than 60/min, Resps less than 12/min and/or somnolence. Lexapro 5 mg po daily.    Hospital course Follow-up  Will continue to follow with you. 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 HEALTH ISSUES - PROBLEM Dx:  Labile mood, Lung cancer, Hypertension, Atrial fibrillation, CVA (cerebral vascular accident).    Chief Complaint: "I'm scared."    History of Present Illness:  69 yo female ex smoker w/hx of lung ca (5/18 VATs/lobectomy), HTN and Afib on Eliquis and ASA presents to [facility] with sudden onset left side weakness and dysarthria. CT head shows acute pontine ICH. CTA head neg stenosis R, Left ICA 50%. . Eliquis held. Hypertensive on admission, BP>200. No neurosurgical intervention. Seen by neurology. BP control. Resume ASA/Eliquis 10-14 days per Neuro plan. Stable for acute inpt rehab. (18 Oct 2018 10:46)    Symptoms and concerns-Patient presents supine in bed, pleasant, co-operative with interview. Tearful during conversation "my blood pressure was high, I don't know why, I take my medication as I was supposed to."  Anxious over current state of health and fear of residual disability.  "How will I manage?" Reports poor sleep when hospitalized at [facility] but attributes it partially to being in a room by the nurses station.  "This is the first time I have cried since my lung surgery."  Describes fear of ability to live the life she had including traveling " I have a trip planned to go to [facility] with a friend."  " My niece is getting engaged tonight."  " I live in [place] 6 mos out of the year.  Will I be able to keep doing that?"                Premorbid functioning/ status-patient was fully independent prior to Lung surgery, working as a  in [place], living there for summer and traveling back to [facility] when summer over.  Reports to have used Xanax in past for mild anxiousness & insomnia, prescribed by her primary MD with good effect.  Denies having had psychiatric care or counseling for any reason.       Psychiatric Review of Systems:  Mood: dysphoric, anxious  Sleep Disturbance-including insomnia  Anxiety-including worries related to current state of health with recent hospitalization and potential for residual functional impairment.      MEDICATIONS  (STANDING):  diltiazem    milliGRAM(s) Oral daily  docusate sodium 100 milliGRAM(s) Oral two times a day  hydrALAZINE 25 milliGRAM(s) Oral every 8 hours  lisinopril 20 milliGRAM(s) Oral daily  metoprolol tartrate 25 milliGRAM(s) Oral two times a day    Allergies  No Known Allergies    PAST MEDICAL & SURGICAL HISTORY:  Lung cancer  Atrial fibrillation  Endometriosis  Hypertension  Fibroid tumor  History of ovarian cyst    Past Psychiatric History:  Reports to have been prescribed Xanax in past for anxiousness & insomnia by primary MD.  Denies inpatient psychiatric treatment or ever having seen a psychiatrist or therapist in the past.   Suicidality/ Aggression-denies SI/SA and or plan.  No suicidal history.     Substance Use History:  -tobacco-history of cigarette smoking quit 1 year ago    Social and Personal History:  female, no children, lives alone.  One sister and one brother, mother alive 93yo.  Works as a  in [place] in summer months.   Hobbies/ Activities of Daily Living-traveling   Legal, Trauma, Violence background-denies    Family History: denies family history of anxiety, depression or other mental health diagnoses.    Family history of premature CAD (Sibling)  Family history of hypertension (Sibling)  Family history of heart disease    T(C): 36.4 (10-18-18 @ 11:34), Max: 36.9 (10-17-18 @ 21:30)  HR: 61 (10-18-18 @ 11:34) (61 - 71)  BP: 136/78 (10-18-18 @ 11:34) (136/78 - 156/77)  RR: 20 (10-18-18 @ 11:34) (20 - 20)  SpO2: 96% (10-18-18 @ 08:05) (96% - 98%)  Wt(kg): --    Mental Status Examination:  General Appearance-blonde haired female wearing glasses.  -hygiene/grooming-good.  Behavior-pleasant, co-operative  -eye contact-good.  Speech-not pressured  Thought Process-logical  Thought Content-focused on concerns related to medical issues/hospitalization, potential to miss out on social activities planned in her life.   Mood-dysphoric, anxious  Affect-congruent  Cognition-alert, oriented x 3  -attention-grossly normal  -recall-grossly normal  Impulse Control-not impaired  Insight/Judgment-not impaired    Imaging-   EXAM:  MR BRAIN WAW IC                          PROCEDURE DATE:  10/15/2018      INTERPRETATION:  CLINICAL HISTORY: Pontine hemorrhage, history of lung   cancer    COMPARISON: CT head dated 10/13/2018    TECHNIQUE: MRI brain: Multiplanar, multisequence MR imaging of the brain   are obtained with and without the administration of 7.5 cc intravenous   Gadavist contrast.     FINDINGS:    1.3 cm pontine hemorrhage with subacute products of blood is   demonstrated. Moderate edema in the richard. There are nonspecific cystic   lesion in the anterior left frontal lobe on image 10, series 6, without   enhancement.    There are multiple tiny foci of diffusion hyperintensity some with low   ADC signal in the bilateral corona radiata on image 24, series 4,   compatible with acute/subacute tiny infarcts.     Scattered periventricular and subcortical white matter T2 /FLAIR   hyperintensities are seen without mass effect, nonspecific, likely   representing moderate to severe chronic microvascular changes.    Normal T2 flow-voids are seen within  the intracranial vasculature. The   lateral ventricles and cortical sulci are age-appropriate in size and   configuration. There is no other mass, mass effect, or extra-axial fluid   collection. There is no other susceptibility artifact to suggest   hemorrhage. Midline structures are normal.     The patient is status post bilateral ocular lens replacement surgery. The   visualized paranasal sinuses, mastoid air cells and orbits are otherwise   unremarkable.      IMPRESSION: Improving/resolving moderate-sized pontine hemorrhage as   compared to the prior head CT. Multiple tiny acute/subacute bilateral   corona radiata infarcts.      SHERRI BLEVINS M.D., ATTENDING RADIOLOGIST  This document has been electronically signed. 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 HOld for SBP less than 100mm/hg, HR less than 60/min, Resps less than 12/min and/or somnolence. Lexapro 5 mg po daily.    Hospital course Follow-up  Will continue to follow with you.

## 2018-10-18 NOTE — PROGRESS NOTE ADULT - SUBJECTIVE AND OBJECTIVE BOX
SUBJECTIVE    REVIEW OF SYSTEMS    General: Not in any pain	    Skin/Breast: No rash  	  ENMT: No visual problems, no sore throat	    Respiratory and Thorax: No cough, No CP, No SOB  	  Cardiovascular: No CP, No palpitations    Gastrointestinal: No Abd pain, No N/V/D    Musculoskeletal: No Joint pain, No back pain	    Neurological: No headache    Psychiatric: No anxiety      OBJECTIVE    Vital Signs Last 24 Hrs  T(C): 36.8 (10-18-18 @ 17:43), Max: 36.9 (10-17-18 @ 21:30)  T(F): 98.3 (10-18-18 @ 17:43), Max: 98.4 (10-17-18 @ 21:30)  HR: 67 (10-18-18 @ 17:43) (61 - 71)  BP: 161/84 (10-18-18 @ 17:43) (136/78 - 161/84)  BP(mean): --  RR: 16 (10-18-18 @ 17:43) (16 - 20)  SpO2: 97% (10-18-18 @ 17:43) (96% - 98%)    PHYSICAL EXAM:    Constitutional: Not in any distress    Eyes: No conjunctival injection    ENMT: No oral lesions    Neck: No nodes, no adenopathy    Back: Straight, no defects    Respiratory: clear b/l    Cardiovascular: RRR, no murmur    Gastrointestinal: soft, NT, ND    Extremities: No edema, no erythema    Neurological: speech slightly improved    Skin: No rash      MEDICATIONS  (STANDING):  buDESOnide 160 MICROgram(s)/formoterol 4.5 MICROgram(s) Inhaler 2 Puff(s) Inhalation two times a day  diltiazem    milliGRAM(s) Oral daily  FLUoxetine 10 milliGRAM(s) Oral daily  hydrALAZINE 25 milliGRAM(s) Oral every 8 hours  influenza   Vaccine 0.5 milliLiter(s) IntraMuscular once  lisinopril 20 milliGRAM(s) Oral daily  metoprolol tartrate 25 milliGRAM(s) Oral two times a day    MEDICATIONS  (PRN):  ALPRAZolam 0.5 milliGRAM(s) Oral every 6 hours PRN anxiety                Allergies    No Known Allergies    Intolerances

## 2018-10-18 NOTE — H&P ADULT - NSHPLABSRESULTS_GEN_ALL_CORE
EXAM:  MR BRAIN WAW IC                          PROCEDURE DATE:  10/15/2018          INTERPRETATION:  CLINICAL HISTORY: Pontine hemorrhage, history of lung   cancer    COMPARISON: CT head dated 10/13/2018    TECHNIQUE: MRI brain: Multiplanar, multisequence MR imaging of the brain   are obtained with and without the administration of 7.5 cc intravenous   Gadavist contrast.     FINDINGS:    1.3 cm pontine hemorrhage with subacute products of blood is   demonstrated. Moderate edema in the richard. There are nonspecific cystic   lesion in the anterior left frontal lobe on image 10, series 6, without   enhancement.    There are multiple tiny foci of diffusion hyperintensity some with low   ADC signal in the bilateral corona radiata on image 24, series 4,   compatible with acute/subacute tiny infarcts.     Scattered periventricular and subcortical white matter T2 /FLAIR   hyperintensities are seen without mass effect, nonspecific, likely   representing moderate to severe chronic microvascular changes.    Normal T2 flow-voids are seen within  the intracranial vasculature. The   lateral ventricles and cortical sulci are age-appropriate in size and   configuration. There is no other mass, mass effect, or extra-axial fluid   collection. There is no other susceptibility artifact to suggest   hemorrhage. Midline structures are normal.     The patient is status post bilateral ocular lens replacement surgery. The   visualized paranasal sinuses, mastoid air cells and orbits are otherwise   unremarkable.      IMPRESSION: Improving/resolving moderate-sized pontine hemorrhage as   compared to the prior head CT. Multiple tiny acute/subacute bilateral   corona radiata infarcts.                  SHERRI BLEVINS M.D., ATTENDING RADIOLOGIST  This document has been electronically signed. Oct 15 2018  2:00PM

## 2018-10-18 NOTE — PROGRESS NOTE ADULT - PROBLEM SELECTOR PROBLEM 1
Hemorrhagic stroke
Hypertension
Hypertension
Hypertensive crisis, unspecified
Hemorrhagic stroke
Hemorrhagic stroke

## 2018-10-18 NOTE — PROGRESS NOTE ADULT - PROBLEM SELECTOR PLAN 3
stable, will add pulmicort today
change steroid neb to LABA/inhaled steroid (Symbicort)
cont current rx
cont pulmicort
hold anticoagulation until pt seen in my office as outpt

## 2018-10-18 NOTE — PROGRESS NOTE ADULT - REASON FOR ADMISSION
ICH/ hypertensive emergency

## 2018-10-18 NOTE — PROGRESS NOTE ADULT - PROBLEM SELECTOR PROBLEM 2
COPD (chronic obstructive pulmonary disease)
Hemorrhagic stroke
Hemorrhagic stroke
Hypertension
Hypertension
Hypertensive crisis, unspecified

## 2018-10-18 NOTE — H&P ADULT - NSHPREVIEWOFSYSTEMS_GEN_ALL_CORE
REVIEW OF SYSTEMS:  CONSTITUTIONAL: No fever, weight loss, or fatigue  EYES: No eye pain, visual disturbances, or discharge  ENMT:  No difficulty hearing, tinnitus, vertigo; No sinus or throat pain  NECK: No pain or stiffness  BREASTS: No pain, masses, or nipple discharge  RESPIRATORY: No cough, wheezing, chills or hemoptysis; No shortness of breath  CARDIOVASCULAR: No chest pain, palpitations, dizziness, or leg swelling  GASTROINTESTINAL: No abdominal or epigastric pain. No nausea, vomiting, or hematemesis; No diarrhea or constipation. No melena or hematochezia.  GENITOURINARY: No dysuria, frequency, hematuria, or incontinence.   NEUROLOGICAL: No headaches, or memory loss, + loss of strength on Left, + numbness on left  SKIN: No itching, burning, rashes, or lesions   LYMPH NODES: No enlarged glands  ENDOCRINE: No heat or cold intolerance; No hair loss  MUSCULOSKELETAL: No joint pain or swelling; No muscle, back, or extremity pain  PSYCHIATRIC:+ mood swings, emotional, poor sleep.

## 2018-10-18 NOTE — PROGRESS NOTE ADULT - PROVIDER SPECIALTY LIST ADULT
Family Medicine
Neurology
Neurosurgery
Rehab Medicine
Critical Care
Family Medicine

## 2018-10-18 NOTE — H&P ADULT - NSHPSOCIALHISTORY_GEN_ALL_CORE
Lives alone in  [address] [place]/no rail, 1st floor half bath, bedroom upstairs 1 flight to climb c rail. Close by family members in the area.   Ex smoker, quit year ago after Lung CA Dx. No drug use.

## 2018-10-18 NOTE — PROGRESS NOTE ADULT - SUBJECTIVE AND OBJECTIVE BOX
Patient in bed, feels well.   Looking to go to rehab and is more receptive and understanding of her deficits.   Reports no pain.     FUNCTIONAL PROGRESS  10/16  Sit-Stand Transfer Training  Transfer Training Sit-to-Stand Transfer: Shelby  Transfer Training Stand-to-Sit Transfer: Shelby    Gait Training  Gait Trainin'x2 RW modA   Gait Analysis: unsteadiness throughout requiring assist for safety, decreased left heel strike, decreased gait velocity, increased unsteadiness c turns, verbal cues to increase enviromental scanning       REVIEW OF SYSTEMS  Constitutional - No fever,  +fatigue  Neurological - +loss of strength, No numbness, No tremors  Skin - No rashes, No lesions   Musculoskeletal - No joint pain, No joint swelling, No muscle pain  ·	Psychiatric - +depression, +anxiety    VITALS  T(C): 36.4 (10-18-18 @ 11:34), Max: 36.9 (10-17-18 @ 21:30)  HR: 61 (10-18-18 @ 11:34) (61 - 78)  BP: 136/78 (10-18-18 @ 11:34) (136/76 - 156/77)  RR: 20 (10-18-18 @ 11:34) (20 - 20)  SpO2: 96% (10-18-18 @ 08:05) (96% - 98%)  Wt(kg): --    MEDICATIONS   ALPRAZolam 0.5 milliGRAM(s) every 6 hours PRN  buDESOnide 160 MICROgram(s)/formoterol 4.5 MICROgram(s) Inhaler 2 Puff(s) two times a day  diltiazem    milliGRAM(s) daily  FLUoxetine 10 milliGRAM(s) daily  hydrALAZINE 25 milliGRAM(s) every 8 hours  influenza   Vaccine 0.5 milliLiter(s) once  lisinopril 20 milliGRAM(s) daily  metoprolol tartrate 25 milliGRAM(s) two times a day      RECENT LABS/IMAGING          ----------------------------------------------------------------------------------------  PHYSICAL EXAM  Constitutional - NAD, Comfortable  Extremities -   No calf tenderness   Neurologic Exam -                    Communication - Fluent, Mild dysarthria     Cranial Nerves - Left lip droop     Motor -                     LEFT    UE - ShAB 4+/5, EF 4/5, EE 4+/5,  4/5                    RIGHT UE - ShAB 5/5, EF 5/5, EE 5/5,  5/5                    LEFT    LE - HF 4/5, KE 5/5, DF 4/5, PF 4/5                    RIGHT LE - HF 5/5, KE 5/5, DF 5/5, PF 5/5        Sensory - Intact to LT  Psychiatric - Flat, anxious  ----------------------------------------------------------------------------------------  ASSESSMENT/PLAN  68yFemale with functional deficits after hemorrhagic CVA related to HTN	  HTN - Hydralazine, Lopressor, Cardizem, Lisinopril  Mood/Motor recovery - Prozac 10mg (10/18), Xanax PRN  DVT PPX - SCDs  Rehab - Recommend ACUTE inpatient rehabilitation for the functional deficits consisting of 3 hours of therapy/day & 24 hour RN/daily PMR physician for comorbid medical management. Will continue to follow for ongoing rehab needs and recommendations. Patient will be able to tolerate 3 hours a day.

## 2018-10-18 NOTE — H&P ADULT - HISTORY OF PRESENT ILLNESS
69 yo female w/hx of lung ca (5/18 VATs/lobectomy), HTN and Afib on Eliquis and ASA presents to [facility] with sudden onset left side weakness and dysarthria. CT head shows acute pontine ICH. Eliquis held. Hypertensive on admission, BP>200. No neurosurgical intervention. Seen by neurology. BP control. Resume ASA/Eliquis 10-14 days per Neuro plan. Stable for acute inpt rehab. 69 yo female w/hx of lung ca (5/18 VATs/lobectomy), HTN and Afib on Eliquis and ASA presents to [facility] with sudden onset left side weakness and dysarthria. CT head shows acute pontine ICH. CTA head neg stenosis R, Left ICA 50%. . Eliquis held. Hypertensive on admission, BP>200. No neurosurgical intervention. Seen by neurology. BP control. Resume ASA/Eliquis 10-14 days per Neuro plan. Stable for acute inpt rehab. 69 yo female ex smoker w/hx of lung ca (5/18 VATs/lobectomy), HTN and Afib on Eliquis and ASA presents to [facility] with sudden onset left side weakness and dysarthria. CT head shows acute pontine ICH. CTA head neg stenosis R, Left ICA 50%. . Eliquis held. Hypertensive on admission, BP>200. No neurosurgical intervention. Seen by neurology. BP control. Resume ASA/Eliquis 10-14 days per Neuro plan. Stable for acute inpt rehab.

## 2018-10-18 NOTE — H&P ADULT - NSHPPHYSICALEXAM_GEN_ALL_CORE
PHYSICAL EXAM  Constitutional - Emotional, cry/laughter inappropriately  HEENT - NCAT, EOMI  Neck - Supple, No limited ROM  Chest - CTA bilaterally, No wheeze, No rhonchi, No crackles  Cardiovascular - RRR, S1S2, No murmurs  Abdomen - BS+, Soft, NTND  Extremities - No C/C/E, No calf tenderness   Skin-no rash  Woumds-na      Neurologic Exam -                   HIF  - Awake, Alert, AAO to self, place, date, year, situation      No aphasia, normal attention, recollects 2/3 after 5min and 3rd item c clues, no apraxia, agnosia     Cranial Nerves - EOMI, No nystagmus/No CAROL, PERRLA, Full VF, Mild NLF decrease on Left, no dysarthria     Motor - Right side 5/5 throughout, Left UE 4/5 proximally and distally (clumsy fine motor L fingers), LLE 5/5                            Sensory - Dimnished/altered left UE/LE sensation as compared to right side.      Reflexes - DTR Intact     Coordination- Clumsy left hand/dysmetria             Balance - impaired     Psychiatric - Labile

## 2018-10-18 NOTE — H&P ADULT - PROBLEM SELECTOR PLAN 1
Start PT/OT/SLP program. Aspiration and fall precautions. Repeat CTH in 10-14days for possible AC start. Monitor BP closely.

## 2018-10-18 NOTE — CHART NOTE - NSCHARTNOTEFT_GEN_A_CORE
REHABILITATION DIAGNOSIS/IMPAIRMENT GROUP CODE:  s/p pontine ICH c left sided weakness    COMORBIDITIES/COMPLICATING CONDITIONS IMPACTING REHABILITATION:  HEALTH ISSUES - PROBLEM Dx:  Labile mood: Labile mood  Lung cancer: Lung cancer  Hypertension: Hypertension  Atrial fibrillation: Atrial fibrillation  CVA (cerebral vascular accident): CVA (cerebral vascular accident)        PAST MEDICAL & SURGICAL HISTORY:  Lung cancer  Atrial fibrillation  Endometriosis  Hypertension  Fibroid tumor  History of ovarian cyst      Based upon consideration of the patient's impairments, functional status, complicating conditions and any other contributing factors and after information garnered from the assessments of all therapy disciplines involved in treating the patient and other pertinent clinicians:    INTERDISCIPLINARY REHABILITATION INTERVENTIONS:    [ x ] Transfer Training  [ x  ] Bed Mobility  [ x  ] Therapeutic Exercise  [ x  ] Balance/Coordination Exercises  [ x  ] Locomotion retraining  [ x  ] Stairs  [ x  ] Functional Transfer Training  [ x  ] Bowel/Bladder program  [ x  ] Pain Management  [ x  ] Skin/Wound Care  [ x  ] Visual/Perceptual Training  [ x  ] Therapeutic Recreation Activities  [ x  ] Neuromuscular Re-education  [ x  ] Activities of Daily Living  [  x ] Speech Exercise  [ x  ] Swallowing Exercises  [ x  ] Vital Stim  [ x  ] Dietary Supplements  [   ] Calorie Count  [ x  ] Cognitive Exercises  [ x  ] Congnitive/Linguistic Treatment  [ x  ] Behavior Program  [ x  ] Neuropsych Therapy  [ x  ] Patient/Family Counseling  [ x  ] Family Training  [ x  ] Community Re-entry  [ x  ] Orthotic Evaluation  [   ] Prosthetic Eval/Training    MEDICAL PROGNOSIS:  good    REHAB POTENTIAL:  excellent  EXPECTED DAILY THERAPY:         PT: 1h       OT:1h       ST: 1h       S&O: eval    EXPECTED INTENSITY OF PROGRAM:  3h day/weekly    EXPECTED FREQUENCY OF PROGRAM:  daily    ESTIMATED LOS:  14 d    ESTIMATED DISPOSITION:  home c home care    INTERDISCIPLINARY FUNCTIONAL OUTCOMES?GOALS:         Gait/Mobility:       Transfers:       ADLs:       Functional Transfers:       Medication Management:       Communication:       Cognitive:       Dysphagia:       Bladder       Bowel: REHABILITATION DIAGNOSIS/IMPAIRMENT GROUP CODE:  s/p pontine ICH c left sided weakness    COMORBIDITIES/COMPLICATING CONDITIONS IMPACTING REHABILITATION:  HEALTH ISSUES - PROBLEM Dx:  Labile mood: Labile mood  Lung cancer: Lung cancer  Hypertension: Hypertension  Atrial fibrillation: Atrial fibrillation  CVA (cerebral vascular accident): CVA (cerebral vascular accident)        PAST MEDICAL & SURGICAL HISTORY:  Lung cancer  Atrial fibrillation  Endometriosis  Hypertension  Fibroid tumor  History of ovarian cyst      Based upon consideration of the patient's impairments, functional status, complicating conditions and any other contributing factors and after information garnered from the assessments of all therapy disciplines involved in treating the patient and other pertinent clinicians:    INTERDISCIPLINARY REHABILITATION INTERVENTIONS:    [ x ] Transfer Training  [ x  ] Bed Mobility  [ x  ] Therapeutic Exercise  [ x  ] Balance/Coordination Exercises  [ x  ] Locomotion retraining  [ x  ] Stairs  [ x  ] Functional Transfer Training  [ x  ] Bowel/Bladder program  [ x  ] Pain Management  [ x  ] Skin/Wound Care  [ x  ] Visual/Perceptual Training  [ x  ] Therapeutic Recreation Activities  [ x  ] Neuromuscular Re-education  [ x  ] Activities of Daily Living  [  x ] Speech Exercise  [  ] Swallowing Exercises  [  ] Vital Stim  [ x  ] Dietary Supplements  [   ] Calorie Count  [ x  ] Cognitive Exercises  [ x  ] Congnitive/Linguistic Treatment  [ x  ] Behavior Program  [ x  ] Neuropsych Therapy  [ x  ] Patient/Family Counseling  [ x  ] Family Training  [   ] Community Re-entry  [   ] Orthotic Evaluation  [   ] Prosthetic Eval/Training    MEDICAL PROGNOSIS:  good    REHAB POTENTIAL:  excellent  EXPECTED DAILY THERAPY:         PT: 1h       OT:1h       ST: 1h       S&O: eval    EXPECTED INTENSITY OF PROGRAM:  3h day/weekly    EXPECTED FREQUENCY OF PROGRAM:  daily    ESTIMATED LOS:  14 d    ESTIMATED DISPOSITION:  home c home care    INTERDISCIPLINARY FUNCTIONAL OUTCOMES?GOALS:         Gait/Mobility: Modified Independent       Transfers:Modified Independent       ADLs:Modified Independent       Functional Transfers:Modified Independent       Medication Management:Modified Independent       Communication:Modified Independent       Cognitive:Supervision       Dysphagia:na       Bladder:Modified Independent       Bowel:Modified Independent

## 2018-10-18 NOTE — H&P ADULT - PROBLEM SELECTOR PLAN 2
Repeat CTH 10-14 days for possible Eliquis start. Hold for now pending clinical course. Cardizem for rate control. Monitor VS closely.

## 2018-10-18 NOTE — PROGRESS NOTE ADULT - PROBLEM SELECTOR PROBLEM 3
COPD (chronic obstructive pulmonary disease)
Atrial fibrillation
COPD (chronic obstructive pulmonary disease)

## 2018-10-18 NOTE — H&P ADULT - ASSESSMENT
67 yo Female c hx of AF on Eliquis s/p pontine ICH now with hemiplegia and functional deficits.       Precautions:    fall, aspiration                                                                              Diet: reg/dash    DVT Prophylaxis:     hold for recent ich                                                               Medical Prognosis: fair    Prescreen Comparison: I have reviewed the prescreen information and I found no relevant changes between the preadmission  screening and my post admission evaluation.     Expected Therapy:   P.T.    1h    hrs/day           O. T. 1     hrs/day           S.L.P.    1    hrs/day                    P&O     eval                                              Excpected Frequency: 5 days/7 day period    Rehab Potential:          good                                 Estimated Disposition:                home c home care          ELOS:    21          days      Rationale For Inpatient Rehab Admission- Patient demonstrates the following:     [X] Medically appropriate for rehabilitation admission   [X] Has attainable rehab goals with an approrpriate discharge plan  [X] Has rehabilitation potential (expected to make significant improvement within a reasonable period of time)  [X] Requires close medical management by a rehab physician, rehab nursing care and comprehensive interdisciplinary team (including         PT, OT, SLP and/or prosthetics and orthotics)

## 2018-10-18 NOTE — H&P ADULT - ATTENDING COMMENTS
68 year old right handed female, working as a , Lung Ca s/p VATS ( did not need RT or chemo), afib started on NOAC- eliquis, admitted to [facility] one week ago with symptoms of left sided weakness, dysarthria and w/u revealed right pontine bleed. Anticoagulants and antiplatelets held. fu scans stable. Pt also with elevated BP at admission. Medications adjusted.   Now admitted for comprehensive rehab. Patient tearful during exam. Mildly dysarthric, left facial droop, left hemiparesis UE> LE, impaired coordination.   Begin full rehab program    2. Essential HTN: On metoprolol BID, hydralazine 25mg q8h, lisinopril, diltiazem    3. Afib: on rate control meds.     4. DVT prophylaxis: SCD, TEDS due to recent ICH

## 2018-10-18 NOTE — PROGRESS NOTE ADULT - PROBLEM SELECTOR PLAN 1
add metoprolol
await approval for acute inpt rehab
better controlled now
will add hydralazine and cont to monitor
Pontine hemorrhage f/u CT today stable  NS/neurology following  PT/OT/Rehab eval  passed dysphagia screen for mech soft with thin liquids  advance diet  Will need MRI w/wo [place] at present pt too "afraid" and wants to be sedated for MRI will have to make arrangements in future   Will downgrade to SD unit today
improved, for transfer to step-down unit; pt eval; acute rehab eval; decr neurochecks to q4

## 2018-10-18 NOTE — PROGRESS NOTE ADULT - PROBLEM SELECTOR PLAN 2
Off cardene gtt   Restarted PO medications
appears stable for transfer to acute in rehab
cont symbicort during aftercare
for MRI brain today; xanax on call
would consider clonidine tomorrow if still running high; avoid beta-blocker secondary to copd; avoid diuretic secondary to electrolyte disturbance in an afib pt not anticoag; consider incr Cardizem; on ace inhib and hydralazine
stable today, cont monitor

## 2018-10-19 DIAGNOSIS — R19.7 DIARRHEA, UNSPECIFIED: ICD-10-CM

## 2018-10-19 DIAGNOSIS — D72.829 ELEVATED WHITE BLOOD CELL COUNT, UNSPECIFIED: ICD-10-CM

## 2018-10-19 LAB
ALBUMIN SERPL ELPH-MCNC: 2.6 G/DL — LOW (ref 3.3–5)
ALP SERPL-CCNC: 107 U/L — SIGNIFICANT CHANGE UP (ref 40–120)
ALT FLD-CCNC: 18 U/L DA — SIGNIFICANT CHANGE UP (ref 10–45)
ANION GAP SERPL CALC-SCNC: 8 MMOL/L — SIGNIFICANT CHANGE UP (ref 5–17)
AST SERPL-CCNC: 16 U/L — SIGNIFICANT CHANGE UP (ref 10–40)
BASOPHILS # BLD AUTO: 0.2 K/UL — SIGNIFICANT CHANGE UP (ref 0–0.2)
BASOPHILS NFR BLD AUTO: 1.8 % — SIGNIFICANT CHANGE UP (ref 0–2)
BILIRUB SERPL-MCNC: 0.3 MG/DL — SIGNIFICANT CHANGE UP (ref 0.2–1.2)
BUN SERPL-MCNC: 21 MG/DL — SIGNIFICANT CHANGE UP (ref 7–23)
CALCIUM SERPL-MCNC: 9 MG/DL — SIGNIFICANT CHANGE UP (ref 8.4–10.5)
CHLORIDE SERPL-SCNC: 106 MMOL/L — SIGNIFICANT CHANGE UP (ref 96–108)
CO2 SERPL-SCNC: 26 MMOL/L — SIGNIFICANT CHANGE UP (ref 22–31)
CREAT SERPL-MCNC: 1.03 MG/DL — SIGNIFICANT CHANGE UP (ref 0.5–1.3)
EOSINOPHIL # BLD AUTO: 0.3 K/UL — SIGNIFICANT CHANGE UP (ref 0–0.5)
EOSINOPHIL NFR BLD AUTO: 2.5 % — SIGNIFICANT CHANGE UP (ref 0–6)
GLUCOSE SERPL-MCNC: 97 MG/DL — SIGNIFICANT CHANGE UP (ref 70–99)
HCT VFR BLD CALC: 41.1 % — SIGNIFICANT CHANGE UP (ref 34.5–45)
HGB BLD-MCNC: 13.4 G/DL — SIGNIFICANT CHANGE UP (ref 11.5–15.5)
LYMPHOCYTES # BLD AUTO: 19.9 % — SIGNIFICANT CHANGE UP (ref 13–44)
LYMPHOCYTES # BLD AUTO: 2.6 K/UL — SIGNIFICANT CHANGE UP (ref 1–3.3)
MCHC RBC-ENTMCNC: 28.8 PG — SIGNIFICANT CHANGE UP (ref 27–34)
MCHC RBC-ENTMCNC: 32.5 GM/DL — SIGNIFICANT CHANGE UP (ref 32–36)
MCV RBC AUTO: 88.5 FL — SIGNIFICANT CHANGE UP (ref 80–100)
MONOCYTES # BLD AUTO: 1.3 K/UL — HIGH (ref 0–0.9)
MONOCYTES NFR BLD AUTO: 9.9 % — HIGH (ref 1–9)
NEUTROPHILS # BLD AUTO: 8.6 K/UL — HIGH (ref 1.8–7.4)
NEUTROPHILS NFR BLD AUTO: 66 % — SIGNIFICANT CHANGE UP (ref 43–77)
PLATELET # BLD AUTO: 348 K/UL — SIGNIFICANT CHANGE UP (ref 150–400)
POTASSIUM SERPL-MCNC: 4.4 MMOL/L — SIGNIFICANT CHANGE UP (ref 3.5–5.3)
POTASSIUM SERPL-SCNC: 4.4 MMOL/L — SIGNIFICANT CHANGE UP (ref 3.5–5.3)
PROT SERPL-MCNC: 6.1 G/DL — SIGNIFICANT CHANGE UP (ref 6–8.3)
RBC # BLD: 4.64 M/UL — SIGNIFICANT CHANGE UP (ref 3.8–5.2)
RBC # FLD: 12.5 % — SIGNIFICANT CHANGE UP (ref 10.3–14.5)
SODIUM SERPL-SCNC: 140 MMOL/L — SIGNIFICANT CHANGE UP (ref 135–145)
WBC # BLD: 13 K/UL — HIGH (ref 3.8–10.5)
WBC # FLD AUTO: 13 K/UL — HIGH (ref 3.8–10.5)

## 2018-10-19 PROCEDURE — 99223 1ST HOSP IP/OBS HIGH 75: CPT

## 2018-10-19 PROCEDURE — 99232 SBSQ HOSP IP/OBS MODERATE 35: CPT | Mod: GC

## 2018-10-19 PROCEDURE — 99232 SBSQ HOSP IP/OBS MODERATE 35: CPT

## 2018-10-19 PROCEDURE — 96116 NUBHVL XM PHYS/QHP 1ST HR: CPT

## 2018-10-19 RX ADMIN — Medication 25 MILLIGRAM(S): at 16:50

## 2018-10-19 RX ADMIN — LISINOPRIL 20 MILLIGRAM(S): 2.5 TABLET ORAL at 05:22

## 2018-10-19 RX ADMIN — Medication 25 MILLIGRAM(S): at 05:22

## 2018-10-19 RX ADMIN — Medication 25 MILLIGRAM(S): at 17:12

## 2018-10-19 RX ADMIN — Medication 240 MILLIGRAM(S): at 21:09

## 2018-10-19 RX ADMIN — Medication 100 MILLIGRAM(S): at 05:21

## 2018-10-19 RX ADMIN — ESCITALOPRAM OXALATE 5 MILLIGRAM(S): 10 TABLET, FILM COATED ORAL at 12:11

## 2018-10-19 RX ADMIN — Medication 25 MILLIGRAM(S): at 21:09

## 2018-10-19 RX ADMIN — Medication 0.25 MILLIGRAM(S): at 21:09

## 2018-10-19 NOTE — PROGRESS NOTE ADULT - ATTENDING COMMENTS
Chart reviewed. Patient seen. Sitting in front of  nursing station in .   Hospital evaluation requested for management of medical comorbidities  Labs show mild leucocytosis. patient afebrile. will monitor    2. Begin full rehab program Chart reviewed. Patient seen. Sitting in front of  nursing station in .   Hospital evaluation requested for management of medical comorbidities  Labs show mild leucocytosis. patient afebrile. will monitor.  Had 2 BMs today. hold off on laxatives      2. Begin full rehab program

## 2018-10-19 NOTE — CONSULT NOTE ADULT - ASSESSMENT
67 yo Female pmh lung cancer 5/18 VATS/lobectomy, essential HTN, chronic afib, pw acute pontine ICH    1. acute pontine ICH: c/w pt/ot   2. left sided weakness secondary to acute cva being actively treated with PT/OT  3. Essential hypertension: in the setting of ICH bp goals 140-160 cw current meds c/w monitoring bp  4. chronic afib rate controlled c/w current meds no a/c due to ICH  5. lung cancer baseline stable  6. depression: c/w current meds  7. dvt ppx: scd 69 yo Female pmh lung cancer 5/18 VATS/lobectomy, essential HTN, chronic afib, pw acute pontine ICH    1. acute pontine ICH: c/w pt/ot   2. left sided weakness secondary to acute cva being actively treated with PT/OT  3. Essential hypertension: in the setting of ICH bp goals 140-160 cw current meds c/w monitoring bp  4. chronic afib rate controlled c/w current meds no a/c due to ICH  5. lung cancer baseline stable  6. depression: c/w current meds  7. dvt ppx: scd  8. leukocytosis which is chronic most likely reactive patient is not complaining of any infectious symptoms she is afebrile will c/w monitoring at this time

## 2018-10-19 NOTE — DIETITIAN INITIAL EVALUATION ADULT. - SOURCE
69 yo female ex smoker w/hx of lung ca (5/18 VATs/lobectomy), HTN and Afib on Eliquis and ASA presents to [facility] with sudden onset left side weakness and dysarthria. CT head shows acute pontine ICH. CTA head neg stenosis R, Left ICA 50%. . Eliquis held. Hypertensive on admission, BP>200. No neurosurgical intervention. Seen by neurology. BP control. Resume ASA/Eliquis 10-14 days per Neuro plan. Stable for acute inpt rehab./patient

## 2018-10-19 NOTE — CONSULT NOTE ADULT - SUBJECTIVE AND OBJECTIVE BOX
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 (19 Oct 2018 08:43)    HPI:  69 yo female ex smoker w/hx of lung ca (5/18 VATs/lobectomy), HTN and Afib on Eliquis and ASA presents to [facility] with sudden onset left side weakness and dysarthria. CT head shows acute pontine ICH. CTA head neg stenosis R, Left ICA 50%. . Eliquis held. Hypertensive on admission, BP>200. No neurosurgical intervention. Seen by neurology. BP control. Resume ASA/Eliquis 10-14 days per Neuro plan. Stable for acute inpt rehab. (18 Oct 2018 10:46)    PAST MEDICAL & SURGICAL HISTORY:  Lung cancer  Atrial fibrillation  Endometriosis  Hypertension  Fibroid tumor  History of ovarian cyst    SOCIAL HISTORY:  Tobacco Usage:  (   ) never smoked   (  x ) former smoker   (   ) current smoker  (     ) pack years    Tobacco Quit Date: 2017  Substance Use (Street drugs): ( x ) never used  (  ) other:  Alcohol Usage: none     Family history reviewed and otherwise non-contributory    ALLERGIES:  No Known Allergies    MEDICATIONS  (STANDING):  ALPRAZolam 0.25 milliGRAM(s) Oral at bedtime  diltiazem    milliGRAM(s) Oral <User Schedule>  escitalopram 5 milliGRAM(s) Oral daily  hydrALAZINE 25 milliGRAM(s) Oral every 8 hours  lisinopril 20 milliGRAM(s) Oral daily  metoprolol tartrate 25 milliGRAM(s) Oral two times a day    MEDICATIONS  (PRN):  ALPRAZolam 0.25 milliGRAM(s) Oral every 8 hours PRN anxiousness    Review of Systems: Refer to HPI for pertinent positives and negatives. All other ROS reviewed and negative except as otherwise stated above.    Vital Signs Last 24 Hrs  T(F): 97.7 (19 Oct 2018 08:39), Max: 98.3 (18 Oct 2018 17:43)  HR: 90 (19 Oct 2018 08:39) (62 - 90)  BP: 137/64 (19 Oct 2018 08:39) (137/64 - 162/86)  RR: 16 (19 Oct 2018 08:39) (15 - 16)  SpO2: 96% (19 Oct 2018 08:39) (96% - 98%)  I&O's Summary    18 Oct 2018 07:01  -  19 Oct 2018 07:00  --------------------------------------------------------  IN: 477 mL / OUT: 0 mL / NET: 477 mL      PHYSICAL EXAM:  GENERAL: NAD, well-groomed, well-developed  HEAD:  Atraumatic, Normocephalic  EYES: EOMI, PERRLA, conjunctiva and sclera clear  ENMT: Moist mucous membranes, Good dentition  NECK: Supple, No JVD  CHEST/LUNG: Clear to auscultation bilaterally; No rales, rhonchi, wheezing, or rubs  HEART: Regular rate and rhythm; S1/S2, No murmurs, rubs, or gallops  ABDOMEN: Soft, Nontender, Nondistended; Bowel sounds present  VASCULAR: Normal pulses, Normal capillary refill  EXTREMITIES: No cyanosis, No edema, moves all 4 extremities  LYMPH: No lymphadenopathy noted  SKIN: Warm, Intact  PSYCH: Normal mood and affect  NERVOUS SYSTEM: left extremity weakness    LABS:                        13.4   13.0  )-----------( 348      ( 19 Oct 2018 05:40 )             41.1     10-19    140  |  106  |  21  ----------------------------<  97  4.4   |  26  |  1.03    Ca    9.0      19 Oct 2018 05:40    TPro  6.1  /  Alb  2.6  /  TBili  0.3  /  DBili  x   /  AST  16  /  ALT  18  /  AlkPhos  107  10-19    eGFR if Non African American: 56 mL/min/1.73M2 (10-19-18 @ 05:40)  eGFR if African American: 65 mL/min/1.73M2 (10-19-18 @ 05:40)                  CAPILLARY BLOOD GLUCOSE              RADIOLOGY & ADDITIONAL TESTS:    Care Discussed with Consultants/Other Providers:

## 2018-10-19 NOTE — CONSULT NOTE ADULT - SUBJECTIVE AND OBJECTIVE BOX
Psychiatric Consult Follow up note:    Identifying Data: 69yo Single female.     History of Present Illness:   69 yo female ex smoker w/hx of lung ca (5/18 VATs/lobectomy), HTN and Afib on Eliquis and ASA presents to [facility] with sudden onset left side weakness and dysarthria. CT head shows acute pontine ICH. CTA head neg stenosis R, Left ICA 50%. . Eliquis held. Hypertensive on admission, BP>200. No neurosurgical intervention. Seen by neurology. BP control. Resume ASA/Eliquis 10-14 days per Neuro plan. Stable for acute inpt rehab. (18 Oct 2018 10:46)    Chief Complaint/Reason for Consult:  Psychiatry consulted due to concern regarding tearfulness, anxiety, depression, insomnia, adjustment to overall major change in health status since having lung cancer and   a CVA. Pt concerned with loss of income, loss of job, being able to pay bills, realistic worries about her future. Pt yesterday was very tearful, reported in part that insomnia was  due to hospital environment and that noise level and lighting played a role. She reports Xanax typically helps with both anxiety and sleep.     Health Issues: Cerebral infarction    Family history of premature CAD (Sibling)  Family history of hypertension (Sibling)  Family history of heart disease  Lung cancer  Atrial fibrillation  Endometriosis  Hypertension  Leukocytosis  Diarrhea  Fibroid tumor  History of ovarian cyst      Psychiatric Review of Systems: Today pt reports less tearfulness, but did have crying spell this am, she also reports being anxious about her room mate's agitation.  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 She insomnia however better last night. No abby, no psychosis, no panic attacks, rest of psych review of systems unremarkable.   Pt cannot tolerate therapeutic dining is isolating not as a symptom of depression, but as a coping skill. She feels "time outs"  help her manage anxiety.     Current medications: ALPRAZolam 0.25 milliGRAM(s) Oral at bedtime  ALPRAZolam 0.25 milliGRAM(s) Oral every 8 hours PRN  diltiazem    milliGRAM(s) Oral <User Schedule>  escitalopram 5 milliGRAM(s) Oral daily  hydrALAZINE 25 milliGRAM(s) Oral every 8 hours  lisinopril 20 milliGRAM(s) Oral daily  metoprolol tartrate 25 milliGRAM(s) Oral two times a day      Labs:      Vital Signs Last 24 hours: T(C): 36.5 (10-19-18 @ 08:39), Max: 36.8 (10-18-18 @ 20:05)  HR: 65 (10-19-18 @ 16:50) (65 - 90)  BP: 163/83 (10-19-18 @ 16:50) (137/64 - 163/83)  RR: 16 (10-19-18 @ 08:39) (15 - 16)  SpO2: 96% (10-19-18 @ 08:39) (96% - 96%)    Allergies: No Known Allergies    Current Mental Status Exam:  Appearance:- well groomed blonde female, wears glasses.   Attitude: - cooperative.   Gait/Station: - walking with walker. , per physiatry or PT notes.  Motor Activity: - calm.  Affect: - appropriate.  Mood: -anxious.   Speech: -normal rate tone and volume.   Thought process: -intact, coherent and goal directed.   Thought content/perceptions: linear, no abnormalities.   Hallucinations:  not present.  Delusions: not present.   Suicidal ideation: denied  Homicidal ideation:   denied  Sensorium: alert.   Orientation: X3  Attention: intact   Concentration: intact grossly   Memory: intact  Insight/Judgment: Good    Assessment and Plan: Continue Lexapro 5 mg, monitor response, continue Xanax both standing and prn as above.     Follow up status: will follow up as needed.       Other recommendations: consider changing rooms for better IADL's (use of WIFI for pt to be able to work in off tx hours).     Level of observation:  Routine

## 2018-10-19 NOTE — PROGRESS NOTE ADULT - SUBJECTIVE AND OBJECTIVE BOX
CHIEF COMPLAINT: loose BM overnight, afebrile. Slept well, denies any new weakness or pain. Alert and awake.       HISTORY OF PRESENT ILLNESS  67 yo female ex smoker w/hx of lung ca (5/18 VATs/lobectomy), HTN and Afib on Eliquis and ASA presents to [facility] with sudden onset left side weakness and dysarthria. CT head shows acute pontine ICH. CTA head neg stenosis R, Left ICA 50%. . Eliquis held. Hypertensive on admission, BP>200. No neurosurgical intervention. Seen by neurology. BP control. Resume ASA/Eliquis 10-14 days per Neuro plan. Stable for acute inpt rehab. (18 Oct 2018 10:46)      PAST MEDICAL & SURGICAL HISTORY:  Lung cancer  Atrial fibrillation  Endometriosis  Hypertension  Fibroid tumor  History of ovarian cyst        REVIEW OF SYMPTOMS  [X] Constitutional WNL     [X] Cardio WNL            [X] Resp WNL           [X] GI WNL                          [X]  WNL                   [X] Heme WNL              [X] Endo WNL                     [X] Skin WNL                 [X] MSK WNL                              [X] Cognitive WNL        [X] Psych WNL      VITALS  Vital Signs Last 24 Hrs  T(C): 36.5 (19 Oct 2018 08:39), Max: 36.8 (18 Oct 2018 17:43)  T(F): 97.7 (19 Oct 2018 08:39), Max: 98.3 (18 Oct 2018 17:43)  HR: 90 (19 Oct 2018 08:39) (61 - 90)  BP: 137/64 (19 Oct 2018 08:39) (136/78 - 162/86)  BP(mean): --  RR: 16 (19 Oct 2018 08:39) (15 - 20)  SpO2: 96% (19 Oct 2018 08:39) (96% - 98%)      PHYSICAL EXAM  Constitutional - NAD, Comfortable  HEENT - NCAT, EOMI  Neck - Supple, No limited ROM  Chest - CTA bilaterally, No wheeze, No rhonchi, No crackles  Cardiovascular - RRR, S1S2, No murmurs  Abdomen - BS+, Soft, NTND  Extremities - No C/C/E, No calf tenderness   Skin-no rash  Wounds-na      Neurologic Exam -                 	  HIF  - Awake, Alert, AAO to self, place, date, year, situation  	    No aphasia, normal attention, recollects 2/3 after 5min and 3rd item c clues, no apraxia, agnosia  	   Cranial Nerves - EOMI, No nystagmus/No CAROL, PERRLA, Full VF, Mild NLF decrease on Left, no dysarthria  	   Motor - Right side 5/5 throughout, Left UE 4/5 proximally and distally (clumsy fine motor L fingers), LLE 5/5  	                       	   Sensory - Dimnished/altered left UE/LE sensation as compared to right side.   	   Reflexes - DTR Intact  	   Coordination- Clumsy left hand/dysmetria left  	        	   Balance - impaired         FUNCTIONAL PROGRESS  Gait - eval  ADLs - eval  Transfers -eval  Functional transfer - eval    RECENT LABS                        13.4   13.0  )-----------( 348      ( 19 Oct 2018 05:40 )             41.1     10-19    140  |  106  |  21  ----------------------------<  97  4.4   |  26  |  1.03    Ca    9.0      19 Oct 2018 05:40    TPro  6.1  /  Alb  2.6<L>  /  TBili  0.3  /  DBili  x   /  AST  16  /  ALT  18  /  AlkPhos  107  10-19      LIVER FUNCTIONS - ( 19 Oct 2018 05:40 )  Alb: 2.6 g/dL / Pro: 6.1 g/dL / ALK PHOS: 107 U/L / ALT: 18 U/L DA / AST: 16 U/L / GGT: x                           RADIOLOGY/OTHER RESULTS      CURRENT MEDICATIONS  MEDICATIONS  (STANDING):  ALPRAZolam 0.25 milliGRAM(s) Oral at bedtime  diltiazem    milliGRAM(s) Oral <User Schedule>  escitalopram 5 milliGRAM(s) Oral daily  hydrALAZINE 25 milliGRAM(s) Oral every 8 hours  lisinopril 20 milliGRAM(s) Oral daily  metoprolol tartrate 25 milliGRAM(s) Oral two times a day    MEDICATIONS  (PRN):  ALPRAZolam 0.25 milliGRAM(s) Oral every 8 hours PRN anxiousness      ASSESSMENT & PLAN          GI/Bowel Management -d/c all laxatives, loose BM overnight   Management - Toilet Q2  Skin - Turn Q2  Pain - Tylenol PRN  DVT PPX - TEDs, SCDs  Diet -reg c thins     Continue comprehensive acute rehab program consisting of 3hrs/day of OT/PT and SLP.

## 2018-10-20 PROCEDURE — 99232 SBSQ HOSP IP/OBS MODERATE 35: CPT

## 2018-10-20 RX ADMIN — Medication 25 MILLIGRAM(S): at 05:43

## 2018-10-20 RX ADMIN — ESCITALOPRAM OXALATE 5 MILLIGRAM(S): 10 TABLET, FILM COATED ORAL at 12:20

## 2018-10-20 RX ADMIN — Medication 25 MILLIGRAM(S): at 21:17

## 2018-10-20 RX ADMIN — LISINOPRIL 20 MILLIGRAM(S): 2.5 TABLET ORAL at 05:43

## 2018-10-20 RX ADMIN — Medication 25 MILLIGRAM(S): at 17:17

## 2018-10-20 RX ADMIN — Medication 25 MILLIGRAM(S): at 13:41

## 2018-10-20 RX ADMIN — Medication 240 MILLIGRAM(S): at 21:17

## 2018-10-20 RX ADMIN — Medication 0.25 MILLIGRAM(S): at 21:16

## 2018-10-20 NOTE — PROGRESS NOTE ADULT - SUBJECTIVE AND OBJECTIVE BOX
CHIEF COMPLAINT: loose BM overnight, afebrile. Slept well, denies any new weakness or pain. Alert and awake.       HISTORY OF PRESENT ILLNESS  69 yo female ex smoker w/hx of lung ca (5/18 VATs/lobectomy), HTN and Afib on Eliquis and ASA presents to [facility] with sudden onset left side weakness and dysarthria. CT head shows acute pontine ICH. CTA head neg stenosis R, Left ICA 50%. . Eliquis held. Hypertensive on admission, BP>200. No neurosurgical intervention. Seen by neurology. BP control. Resume ASA/Eliquis 10-14 days per Neuro plan. Stable for acute inpt rehab. (18 Oct 2018 10:46)      PAST MEDICAL & SURGICAL HISTORY:  Lung cancer  Atrial fibrillation  Endometriosis  Hypertension  Fibroid tumor  History of ovarian cyst        REVIEW OF SYMPTOMS: No SOB, no pain  [X] Constitutional WNL     [X] Cardio WNL            [X] Resp WNL           [X] GI WNL                          [X]  WNL                   [] Heme WNL              [] Endo WNL                     [X] Skin WNL                 [X] MSK WNL                              [X] Cognitive WNL        [X] Psych WNL      VITALSVital Signs Last 24 Hrs  T(C): 36.8 (19 Oct 2018 21:13), Max: 36.8 (19 Oct 2018 21:13)  T(F): 98.2 (19 Oct 2018 21:13), Max: 98.2 (19 Oct 2018 21:13)  HR: 66 (20 Oct 2018 05:44) (65 - 90)  BP: 135/76 (20 Oct 2018 05:44) (135/76 - 163/83)  BP(mean): --  RR: 14 (19 Oct 2018 21:13) (14 - 16)  SpO2: 98% (19 Oct 2018 21:13) (96% - 98%)  Vital Signs Last 24 Hrs  T(C): 36.5 (19 Oct 2018 08:39), Max: 36.8 (18 Oct 2018 17:43)  T(F): 97.7 (19 Oct 2018 08:39), Max: 98.3 (18 Oct 2018 17:43)  HR: 90 (19 Oct 2018 08:39) (61 - 90)  BP: 137/64 (19 Oct 2018 08:39) (136/78 - 162/86)  BP(mean): --  RR: 16 (19 Oct 2018 08:39) (15 - 20)  SpO2: 96% (19 Oct 2018 08:39) (96% - 98%)      PHYSICAL EXAM  Constitutional - NAD, Comfortable  HEENT - NCAT  Neck - Supple, No limited ROM  Chest - CTA bilaterally, No wheeze, No rhonchi, No crackles  Cardiovascular - RRR, S1S2, No murmurs  Abdomen - BS+, Soft, NTND  Extremities - No C/C/E, No calf tenderness   Skin-no rash  Wounds-na      Neurologic Exam -                 	  HIF  - Awake, Alert, AAO to self, place, date  	    No aphasia, normal attention  	   Cranial Nerves - EOMI, No nystagmus, Mild NLF decrease on Left, no dysarthria  	   Motor - Right side 5/5 throughout, Left UE 4/5 proximally and distally (clumsy fine motor L fingers), LLE 5/5  	                       	   Sensory - Dimnished/altered left UE/LE sensation as compared to right side.   	   Reflexes - DTR Intact  	   Coordination- Clumsy left hand/dysmetria left  	        	      RECENT LABSLABS:      Ca    9.0        19 Oct 2018 05:40                              13.4   13.0  )-----------( 348      ( 19 Oct 2018 05:40 )             41.1     10-19    140  |  106  |  21  ----------------------------<  97  4.4   |  26  |  1.03    Ca    9.0      19 Oct 2018 05:40    TPro  6.1  /  Alb  2.6<L>  /  TBili  0.3  /  DBili  x   /  AST  16  /  ALT  18  /  AlkPhos  107  10-19      LIVER FUNCTIONS - ( 19 Oct 2018 05:40 )  Alb: 2.6 g/dL / Pro: 6.1 g/dL / ALK PHOS: 107 U/L / ALT: 18 U/L DA / AST: 16 U/L / GGT: x                           RADIOLOGY/OTHER RESULTS      CURRENT MEDICATIONS  MEDICATIONS  (STANDING):  ALPRAZolam 0.25 milliGRAM(s) Oral at bedtime  diltiazem    milliGRAM(s) Oral <User Schedule>  escitalopram 5 milliGRAM(s) Oral daily  hydrALAZINE 25 milliGRAM(s) Oral every 8 hours  lisinopril 20 milliGRAM(s) Oral daily  metoprolol tartrate 25 milliGRAM(s) Oral two times a day    MEDICATIONS  (PRN):  ALPRAZolam 0.25 milliGRAM(s) Oral every 8 hours PRN anxiousness      ASSESSMENT & PLAN          GI/Bowel Management -d/c all laxatives, loose BM overnight   Management - Toilet Q2  Skin - Turn Q2  Pain - Tylenol PRN  DVT PPX - TEDs, SCDs  Diet -reg c thins

## 2018-10-21 LAB
BASOPHILS # BLD AUTO: 0.2 K/UL — SIGNIFICANT CHANGE UP (ref 0–0.2)
BASOPHILS NFR BLD AUTO: 1.9 % — SIGNIFICANT CHANGE UP (ref 0–2)
EOSINOPHIL # BLD AUTO: 0.3 K/UL — SIGNIFICANT CHANGE UP (ref 0–0.5)
EOSINOPHIL NFR BLD AUTO: 2.8 % — SIGNIFICANT CHANGE UP (ref 0–6)
HCT VFR BLD CALC: 41.6 % — SIGNIFICANT CHANGE UP (ref 34.5–45)
HGB BLD-MCNC: 13.5 G/DL — SIGNIFICANT CHANGE UP (ref 11.5–15.5)
LYMPHOCYTES # BLD AUTO: 27.3 % — SIGNIFICANT CHANGE UP (ref 13–44)
LYMPHOCYTES # BLD AUTO: 3.3 K/UL — SIGNIFICANT CHANGE UP (ref 1–3.3)
MCHC RBC-ENTMCNC: 28.6 PG — SIGNIFICANT CHANGE UP (ref 27–34)
MCHC RBC-ENTMCNC: 32.4 GM/DL — SIGNIFICANT CHANGE UP (ref 32–36)
MCV RBC AUTO: 88.1 FL — SIGNIFICANT CHANGE UP (ref 80–100)
MONOCYTES # BLD AUTO: 1 K/UL — HIGH (ref 0–0.9)
MONOCYTES NFR BLD AUTO: 7.9 % — SIGNIFICANT CHANGE UP (ref 1–9)
NEUTROPHILS # BLD AUTO: 7.4 K/UL — SIGNIFICANT CHANGE UP (ref 1.8–7.4)
NEUTROPHILS NFR BLD AUTO: 60.1 % — SIGNIFICANT CHANGE UP (ref 43–77)
PLATELET # BLD AUTO: 403 K/UL — HIGH (ref 150–400)
RBC # BLD: 4.72 M/UL — SIGNIFICANT CHANGE UP (ref 3.8–5.2)
RBC # FLD: 12.6 % — SIGNIFICANT CHANGE UP (ref 10.3–14.5)
WBC # BLD: 12.3 K/UL — HIGH (ref 3.8–10.5)
WBC # FLD AUTO: 12.3 K/UL — HIGH (ref 3.8–10.5)

## 2018-10-21 PROCEDURE — 99232 SBSQ HOSP IP/OBS MODERATE 35: CPT

## 2018-10-21 RX ADMIN — Medication 240 MILLIGRAM(S): at 21:09

## 2018-10-21 RX ADMIN — Medication 25 MILLIGRAM(S): at 21:09

## 2018-10-21 RX ADMIN — Medication 25 MILLIGRAM(S): at 17:29

## 2018-10-21 RX ADMIN — LISINOPRIL 20 MILLIGRAM(S): 2.5 TABLET ORAL at 05:14

## 2018-10-21 RX ADMIN — Medication 25 MILLIGRAM(S): at 05:15

## 2018-10-21 RX ADMIN — Medication 25 MILLIGRAM(S): at 05:14

## 2018-10-21 RX ADMIN — Medication 25 MILLIGRAM(S): at 13:14

## 2018-10-21 RX ADMIN — Medication 0.25 MILLIGRAM(S): at 21:10

## 2018-10-21 RX ADMIN — ESCITALOPRAM OXALATE 5 MILLIGRAM(S): 10 TABLET, FILM COATED ORAL at 12:57

## 2018-10-21 NOTE — PROGRESS NOTE ADULT - SUBJECTIVE AND OBJECTIVE BOX
CHIEF COMPLAINT: loose BM overnight, afebrile. Slept well, denies any new weakness or pain. Alert and awake.       HISTORY OF PRESENT ILLNESS  69 yo female ex smoker w/hx of lung ca (5/18 VATs/lobectomy), HTN and Afib on Eliquis and ASA presents to [facility] with sudden onset left side weakness and dysarthria. CT head shows acute pontine ICH. CTA head neg stenosis R, Left ICA 50%. . Eliquis held. Hypertensive on admission, BP>200. No neurosurgical intervention. Seen by neurology. BP control. Resume ASA/Eliquis 10-14 days per Neuro plan. Stable for acute inpt rehab. (18 Oct 2018 10:46)      PAST MEDICAL & SURGICAL HISTORY:  Lung cancer  Atrial fibrillation  Endometriosis  Hypertension  Fibroid tumor  History of ovarian cyst        REVIEW OF SYMPTOMS: No SOB, no pain, no n/v, no acure events overnight  [X] Constitutional WNL     [X] Cardio WNL            [X] Resp WNL           [X] GI WNL                          [X]  WNL                   [] Heme WNL              [] Endo WNL                     [X] Skin WNL                 [X] MSK WNL                              [X] Cognitive WNL        [X] Psych WNL      VITALS  Vital Signs Last 24 Hrs  T(C): 36.9 (21 Oct 2018 08:12), Max: 36.9 (20 Oct 2018 21:15)  T(F): 98.4 (21 Oct 2018 08:12), Max: 98.5 (20 Oct 2018 21:15)  HR: 70 (21 Oct 2018 08:12) (62 - 73)  BP: 144/69 (21 Oct 2018 08:12) (121/65 - 160/82)  BP(mean): --  RR: 13 (21 Oct 2018 08:12) (13 - 14)  SpO2: 95% (21 Oct 2018 08:12) (95% - 95%)      PHYSICAL EXAM  Constitutional - NAD, Comfortable  HEENT - NCAT  Neck - Supple, No limited ROM  Chest - CTA bilaterally, No wheeze, No rhonchi, No crackles  Cardiovascular - RRR, S1S2, No murmurs  Abdomen - BS+, Soft, NTND  Extremities - No C/C/E, No calf tenderness   Skin-no rash  Wounds-na      Neurologic Exam -                 	  HIF  - Awake, Alert, AAO to self, place, date  	    No aphasia, normal attention  	   Motor - Right side 5/5 throughout, Left UE 4/5 proximally and distally (clumsy fine motor L fingers), LLE 5/5  	                       	   Sensory - Dimnished/altered left UE/LE sensation as compared to right side.   	   Reflexes - DTR Intact  	   Coordination- Clumsy left hand/dysmetria left  	        	      RECENT LABSLABS:  LABS:                        13.5   12.3  )-----------( 403      ( 21 Oct 2018 05:45 )             41.6                 Ca    9.0        19 Oct 2018 05:40                              13.4   13.0  )-----------( 348      ( 19 Oct 2018 05:40 )             41.1     10-19    140  |  106  |  21  ----------------------------<  97  4.4   |  26  |  1.03    Ca    9.0      19 Oct 2018 05:40    TPro  6.1  /  Alb  2.6<L>  /  TBili  0.3  /  DBili  x   /  AST  16  /  ALT  18  /  AlkPhos  107  10-19      LIVER FUNCTIONS - ( 19 Oct 2018 05:40 )  Alb: 2.6 g/dL / Pro: 6.1 g/dL / ALK PHOS: 107 U/L / ALT: 18 U/L DA / AST: 16 U/L / GGT: x                           RADIOLOGY/OTHER RESULTS      CURRENT MEDICATIONS  MEDICATIONS  (STANDING):  ALPRAZolam 0.25 milliGRAM(s) Oral at bedtime  diltiazem    milliGRAM(s) Oral <User Schedule>  escitalopram 5 milliGRAM(s) Oral daily  hydrALAZINE 25 milliGRAM(s) Oral every 8 hours  lisinopril 20 milliGRAM(s) Oral daily  metoprolol tartrate 25 milliGRAM(s) Oral two times a day    MEDICATIONS  (PRN):  ALPRAZolam 0.25 milliGRAM(s) Oral every 8 hours PRN anxiousness      ASSESSMENT & PLAN          GI/Bowel Management -d/c all laxatives, loose BM overnight   Management - Toilet Q2  Skin - Turn Q2  Pain - Tylenol PRN  DVT PPX - TEDs, SCDs  Diet -reg c thins

## 2018-10-22 DIAGNOSIS — I61.9 NONTRAUMATIC INTRACEREBRAL HEMORRHAGE, UNSPECIFIED: ICD-10-CM

## 2018-10-22 LAB
ALBUMIN SERPL ELPH-MCNC: 2.9 G/DL — LOW (ref 3.3–5)
ALP SERPL-CCNC: 117 U/L — SIGNIFICANT CHANGE UP (ref 40–120)
ALT FLD-CCNC: 16 U/L DA — SIGNIFICANT CHANGE UP (ref 10–45)
ANION GAP SERPL CALC-SCNC: 9 MMOL/L — SIGNIFICANT CHANGE UP (ref 5–17)
AST SERPL-CCNC: 11 U/L — SIGNIFICANT CHANGE UP (ref 10–40)
BILIRUB SERPL-MCNC: 0.3 MG/DL — SIGNIFICANT CHANGE UP (ref 0.2–1.2)
BUN SERPL-MCNC: 21 MG/DL — SIGNIFICANT CHANGE UP (ref 7–23)
CALCIUM SERPL-MCNC: 9.1 MG/DL — SIGNIFICANT CHANGE UP (ref 8.4–10.5)
CHLORIDE SERPL-SCNC: 105 MMOL/L — SIGNIFICANT CHANGE UP (ref 96–108)
CO2 SERPL-SCNC: 24 MMOL/L — SIGNIFICANT CHANGE UP (ref 22–31)
CREAT SERPL-MCNC: 1.09 MG/DL — SIGNIFICANT CHANGE UP (ref 0.5–1.3)
GLUCOSE SERPL-MCNC: 113 MG/DL — HIGH (ref 70–99)
HCT VFR BLD CALC: 42 % — SIGNIFICANT CHANGE UP (ref 34.5–45)
HGB BLD-MCNC: 13.7 G/DL — SIGNIFICANT CHANGE UP (ref 11.5–15.5)
MCHC RBC-ENTMCNC: 28.8 PG — SIGNIFICANT CHANGE UP (ref 27–34)
MCHC RBC-ENTMCNC: 32.6 GM/DL — SIGNIFICANT CHANGE UP (ref 32–36)
MCV RBC AUTO: 88.4 FL — SIGNIFICANT CHANGE UP (ref 80–100)
PLATELET # BLD AUTO: 417 K/UL — HIGH (ref 150–400)
POTASSIUM SERPL-MCNC: 4.4 MMOL/L — SIGNIFICANT CHANGE UP (ref 3.5–5.3)
POTASSIUM SERPL-SCNC: 4.4 MMOL/L — SIGNIFICANT CHANGE UP (ref 3.5–5.3)
PROT SERPL-MCNC: 6.6 G/DL — SIGNIFICANT CHANGE UP (ref 6–8.3)
RBC # BLD: 4.75 M/UL — SIGNIFICANT CHANGE UP (ref 3.8–5.2)
RBC # FLD: 12.6 % — SIGNIFICANT CHANGE UP (ref 10.3–14.5)
SODIUM SERPL-SCNC: 138 MMOL/L — SIGNIFICANT CHANGE UP (ref 135–145)
WBC # BLD: 12.8 K/UL — HIGH (ref 3.8–10.5)
WBC # FLD AUTO: 12.8 K/UL — HIGH (ref 3.8–10.5)

## 2018-10-22 PROCEDURE — 99233 SBSQ HOSP IP/OBS HIGH 50: CPT

## 2018-10-22 PROCEDURE — 99232 SBSQ HOSP IP/OBS MODERATE 35: CPT

## 2018-10-22 PROCEDURE — 90832 PSYTX W PT 30 MINUTES: CPT

## 2018-10-22 RX ADMIN — Medication 25 MILLIGRAM(S): at 13:29

## 2018-10-22 RX ADMIN — Medication 25 MILLIGRAM(S): at 05:58

## 2018-10-22 RX ADMIN — ESCITALOPRAM OXALATE 5 MILLIGRAM(S): 10 TABLET, FILM COATED ORAL at 12:06

## 2018-10-22 RX ADMIN — Medication 0.25 MILLIGRAM(S): at 21:13

## 2018-10-22 RX ADMIN — LISINOPRIL 20 MILLIGRAM(S): 2.5 TABLET ORAL at 05:58

## 2018-10-22 RX ADMIN — Medication 25 MILLIGRAM(S): at 21:13

## 2018-10-22 RX ADMIN — Medication 240 MILLIGRAM(S): at 21:13

## 2018-10-22 RX ADMIN — Medication 25 MILLIGRAM(S): at 17:07

## 2018-10-22 NOTE — PROGRESS NOTE ADULT - ATTENDING COMMENTS
Patient is examined, chart reviewed, treatment plan and discharge plan were discussed with staff.   Will repeat Head CT as scheduled   Tearful, anxious, depressed, continue SSRI/Xanax, emotional support provided, psychiatry is following.

## 2018-10-22 NOTE — PROGRESS NOTE ADULT - SUBJECTIVE AND OBJECTIVE BOX
Psychiatry Consultation-Liaison Follow-up Note  68y  Encounter: 10/22/2018  Chart reviewed,  	  Met with-patient  Case Discussed with-nursing    Interval History:      Symptom progression-    Medication Changes-  ALPRAZolam 0.25 milliGRAM(s) Oral at bedtime  ALPRAZolam 0.25 milliGRAM(s) Oral every 8 hours PRN  diltiazem    milliGRAM(s) Oral <User Schedule>  escitalopram 5 milliGRAM(s) Oral daily  hydrALAZINE 25 milliGRAM(s) Oral every 8 hours  lisinopril 20 milliGRAM(s) Oral daily  metoprolol tartrate 25 milliGRAM(s) Oral two times a day      Mental Status Examination:      Studies:    Laboratory testing-                        13.7   12.8  )-----------( 417      ( 22 Oct 2018 09:56 )             42.0     10-22    138  |  105  |  21  ----------------------------<  113<H>  4.4   |  24  |  1.09    Ca    9.1      22 Oct 2018 09:56    TPro  6.6  /  Alb  2.9<L>  /  TBili  0.3  /  DBili  x   /  AST  11  /  ALT  16  /  AlkPhos  117  10-22  LIVER FUNCTIONS - ( 22 Oct 2018 09:56 )  Alb: 2.9 g/dL / Pro: 6.6 g/dL / ALK PHOS: 117 U/L / ALT: 16 U/L DA / AST: 11 U/L / GGT: x             Assessment:    Recommendations:      Medication-     Other Treatment considerations-    Guidance for team/ family management-    Guidance for patient management-    Referrals-     Hospital course Follow-up  (sign off, follow, re-consult as needed, call for clearance prior to discharge)- Psychiatry Consultation-Liaison Follow-up Note  68y  Encounter: 10/22/2018  Chart reviewed,  	  Met with-patient  Case Discussed with-nursing    History of Present Illness:   67 yo female ex smoker w/hx of lung ca (5/18 VATs/lobectomy), HTN and Afib on Eliquis and ASA presents to [facility] with sudden onset left side weakness and dysarthria. CT head shows acute pontine ICH. CTA head neg stenosis R, Left ICA 50%. . Eliquis held. Hypertensive on admission, BP>200. No neurosurgical intervention. Seen by neurology. BP control. Resume ASA/Eliquis 10-14 days per Neuro plan. Stable for acute inpt rehab. (18 Oct 2018 10:46)    Chief Complaint/Reason for Consult:  Psychiatry consulted due to concern regarding tearfulness, anxiety, depression, insomnia, adjustment to overall major change in health status since having lung cancer and   a CVA. Pt concerned with loss of income, loss of job, being able to pay bills, realistic worries about her future. Pt presents today reporting restful sleep last night with less tearfulness and feeling calmer.  Continues to voice concerns over medical condition and future disability as well as her ability to carry on communication via e-mail in hospital with poor Wi Fi access in room.      Health Issues: Cerebral infarction    Family history of premature CAD (Sibling)  Family history of hypertension (Sibling)  Family history of heart disease  Lung cancer  Atrial fibrillation  Endometriosis  Hypertension  Leukocytosis  Diarrhea  Fibroid tumor  History of ovarian cyst    Psychiatric Review of Systems: Less tearful, feels calmer presently.  Reports to have restful sleep last night.       Allergies: No Known Allergies    Current Mental Status Exam:  Appearance:- well groomed blonde female, wears glasses, sitting up in wheelchair.   Attitude: - cooperative.   Gait/Station: - walking with walker. , per physiatry or PT notes.  Motor Activity: - calm.  Affect: - appropriate.  Mood: -mildly anxious.   Speech: -normal rate tone and volume.   Thought process: -intact, coherent and goal directed.   Thought content/perceptions: linear, no abnormalities.   Hallucinations:  not present.  Delusions: not present.   Suicidal ideation: denied  Homicidal ideation:   denied  Sensorium: alert.   Orientation: X3  Attention: intact   Concentration: intact grossly   Memory: intact  Insight/Judgment: Good    Medication Changes-  ALPRAZolam 0.25 milliGRAM(s) Oral at bedtime  ALPRAZolam 0.25 milliGRAM(s) Oral every 8 hours PRN  diltiazem    milliGRAM(s) Oral <User Schedule>  escitalopram 5 milliGRAM(s) Oral daily  hydrALAZINE 25 milliGRAM(s) Oral every 8 hours  lisinopril 20 milliGRAM(s) Oral daily  metoprolol tartrate 25 milliGRAM(s) Oral two times a day      Laboratory testing-                        13.7   12.8  )-----------( 417      ( 22 Oct 2018 09:56 )             42.0     10-22    138  |  105  |  21  ----------------------------<  113<H>  4.4   |  24  |  1.09    Ca    9.1      22 Oct 2018 09:56    TPro  6.6  /  Alb  2.9<L>  /  TBili  0.3  /  DBili  x   /  AST  11  /  ALT  16  /  AlkPhos  117  10-22  LIVER FUNCTIONS - ( 22 Oct 2018 09:56 )  Alb: 2.9 g/dL / Pro: 6.6 g/dL / ALK PHOS: 117 U/L / ALT: 16 U/L DA / AST: 11 U/L / GGT: x             Assessment and Plan: Continue Lexapro 5 mg, continue to monitor response, continue Xanax both standing and prn as above.     Follow up status: will follow up as needed.       Other recommendations: consider changing rooms for better IADL's (use of WIFI for pt to be able to work in off tx hours).     Level of observation:  Routine Psychiatry Consultation-Liaison Follow-up Note  68y  Encounter: 10/22/2018  Chart reviewed,  	  Met with-patient  Case Discussed with-nursing    History of Present Illness:   67 yo female ex smoker w/hx of lung ca (5/18 VATs/lobectomy), HTN and Afib on Eliquis and ASA presents to [facility] with sudden onset left side weakness and dysarthria. CT head shows acute pontine ICH. CTA head neg stenosis R, Left ICA 50%. . Eliquis held. Hypertensive on admission, BP>200. No neurosurgical intervention. Seen by neurology. BP control. Resume ASA/Eliquis 10-14 days per Neuro plan. Stable for acute inpt rehab. (18 Oct 2018 10:46)    Chief Complaint/Reason for Consult:  Psychiatry consulted due to concern regarding tearfulness, anxiety, depression, insomnia, adjustment to overall major change in health status since having lung cancer and   a CVA. Pt concerned with loss of income, loss of job, being able to pay bills, realistic worries about her future. Pt presents today reporting restful sleep last night with less tearfulness and feeling calmer.  Continues to voice concerns over medical condition and future disability as well as her ability to carry on communication via e-mail in hospital with poor Wi Fi access in room.      Health Issues: Cerebral infarction    Family history of premature CAD (Sibling)  Family history of hypertension (Sibling)  Family history of heart disease  Lung cancer  Atrial fibrillation  Endometriosis  Hypertension  Leukocytosis  Diarrhea  Fibroid tumor  History of ovarian cyst    Psychiatric Review of Systems: Less tearful, feels calmer presently.  Reports to have restful sleep last night.       Allergies: No Known Allergies    Current Mental Status Exam:  Appearance:- well groomed blonde female, wears glasses, sitting up in wheelchair.   Attitude: - cooperative.   Gait/Station: - walking with walker. , per physiatry or PT notes.  Motor Activity: - calm.  Affect: - appropriate.  Mood: -mildly anxious.   Speech: -normal rate tone and volume.   Thought process: -intact, coherent and goal directed.   Thought content/perceptions: linear, no abnormalities.   Hallucinations:  not present.  Delusions: not present.   Suicidal ideation: denied  Homicidal ideation:   denied  Sensorium: alert.   Orientation: X3  Attention: intact   Concentration: intact grossly   Memory: intact  Insight/Judgment: Good    Medication Changes-  ALPRAZolam 0.25 milliGRAM(s) Oral at bedtime  ALPRAZolam 0.25 milliGRAM(s) Oral every 8 hours PRN  diltiazem    milliGRAM(s) Oral <User Schedule>  escitalopram 5 milliGRAM(s) Oral daily  hydrALAZINE 25 milliGRAM(s) Oral every 8 hours  lisinopril 20 milliGRAM(s) Oral daily  metoprolol tartrate 25 milliGRAM(s) Oral two times a day      Laboratory testing-                        13.7   12.8  )-----------( 417      ( 22 Oct 2018 09:56 )             42.0     10-22    138  |  105  |  21  ----------------------------<  113<H>  4.4   |  24  |  1.09    Ca    9.1      22 Oct 2018 09:56    TPro  6.6  /  Alb  2.9<L>  /  TBili  0.3  /  DBili  x   /  AST  11  /  ALT  16  /  AlkPhos  117  10-22  LIVER FUNCTIONS - ( 22 Oct 2018 09:56 )  Alb: 2.9 g/dL / Pro: 6.6 g/dL / ALK PHOS: 117 U/L / ALT: 16 U/L DA / AST: 11 U/L / GGT: x           Vital Signs Last 24 Hrs  T(F): 97.6 (22 Oct 2018 08:12), Max: 98.9 (21 Oct 2018 21:43)  HR: 60 (22 Oct 2018 08:12) (60 - 65)  BP: 125/67 (22 Oct 2018 08:12) (125/67 - 145/79)  RR: 14 (22 Oct 2018 08:12) (14 - 14)  SpO2: 96% (22 Oct 2018 08:12) (96% - 96%)  I&O's Summary    Assessment and Plan: Continue Lexapro 5 mg, continue to monitor response, continue Xanax both standing and prn as above.     Follow up status: will follow up as needed.       Other recommendations: consider changing rooms for better IADL's (use of WIFI for pt to be able to work in off tx hours).     Level of observation:  Routine

## 2018-10-22 NOTE — PROGRESS NOTE ADULT - SUBJECTIVE AND OBJECTIVE BOX
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 (22 Oct 2018 12:14)    Patient seen and examined at bedside.    ALLERGIES:  No Known Allergies    MEDICATIONS  (STANDING):  ALPRAZolam 0.25 milliGRAM(s) Oral at bedtime  diltiazem    milliGRAM(s) Oral <User Schedule>  escitalopram 5 milliGRAM(s) Oral daily  hydrALAZINE 25 milliGRAM(s) Oral every 8 hours  lisinopril 20 milliGRAM(s) Oral daily  metoprolol tartrate 25 milliGRAM(s) Oral two times a day    MEDICATIONS  (PRN):  ALPRAZolam 0.25 milliGRAM(s) Oral every 8 hours PRN anxiousness    Vital Signs Last 24 Hrs  T(F): 97.6 (22 Oct 2018 08:12), Max: 98.9 (21 Oct 2018 21:43)  HR: 60 (22 Oct 2018 08:12) (60 - 65)  BP: 125/67 (22 Oct 2018 08:12) (125/67 - 145/79)  RR: 14 (22 Oct 2018 08:12) (14 - 14)  SpO2: 96% (22 Oct 2018 08:12) (96% - 96%)  I&O's Summary    21 Oct 2018 07:01  -  22 Oct 2018 07:00  --------------------------------------------------------  IN: 540 mL / OUT: 0 mL / NET: 540 mL      PHYSICAL EXAM:  General: NAD, A/O x 3  ENT: MMM  Neck: Supple, No JVD  Lungs: Clear to auscultation bilaterally  Cardio: RRR, S1/S2, No murmurs  Abdomen: Soft, Nontender, Nondistended; Bowel sounds present  Extremities: No calf tenderness, No pitting edema    LABS:                        13.7   12.8  )-----------( 417      ( 22 Oct 2018 09:56 )             42.0     10-22    138  |  105  |  21  ----------------------------<  113  4.4   |  24  |  1.09    Ca    9.1      22 Oct 2018 09:56    TPro  6.6  /  Alb  2.9  /  TBili  0.3  /  DBili  x   /  AST  11  /  ALT  16  /  AlkPhos  117  10-22    eGFR if Non African American: 52 mL/min/1.73M2 (10-22-18 @ 09:56)  eGFR if : 61 mL/min/1.73M2 (10-22-18 @ 09:56)      RADIOLOGY & ADDITIONAL TESTS:  < from: MR Head w/wo IV Cont (10.15.18 @ 12:51) >  IMPRESSION: Improving/resolving moderate-sized pontine hemorrhage as   compared to the prior head CT. Multiple tiny acute/subacute bilateral   corona radiata infarcts.    < end of copied text >      Care Discussed with Consultants/Other Providers: Dr. Lovell

## 2018-10-22 NOTE — PROGRESS NOTE ADULT - SUBJECTIVE AND OBJECTIVE BOX
CHIEF COMPLAINT: Left foot numbness this am, resolved now.       HISTORY OF PRESENT ILLNESS  67 yo female ex smoker w/hx of lung ca (5/18 VATs/lobectomy), HTN and Afib on Eliquis and ASA presents to [facility] with sudden onset left side weakness and dysarthria. CT head shows acute pontine ICH. CTA head neg stenosis R, Left ICA 50%. . Eliquis held. Hypertensive on admission, BP>200. No neurosurgical intervention. Seen by neurology. BP control. Resume ASA/Eliquis 10-14 days per Neuro plan. Stable for acute inpt rehab. (18 Oct 2018 10:46)      PAST MEDICAL & SURGICAL HISTORY:  Lung cancer  Atrial fibrillation  Endometriosis  Hypertension  Fibroid tumor  History of ovarian cyst        REVIEW OF SYMPTOMS  [X] Constitutional WNL     [X] Cardio WNL            [X] Resp WNL           [X] GI WNL                          [X]  WNL                   [X] Heme WNL              [X] Endo WNL                     [X] Skin WNL                 [X] MSK WNL                               [X] Cognitive WNL           VITALS  Vital Signs Last 24 Hrs  T(C): 36.4 (22 Oct 2018 08:12), Max: 37.2 (21 Oct 2018 21:43)  T(F): 97.6 (22 Oct 2018 08:12), Max: 98.9 (21 Oct 2018 21:43)  HR: 60 (22 Oct 2018 08:12) (60 - 65)  BP: 125/67 (22 Oct 2018 08:12) (125/67 - 145/79)  BP(mean): --  RR: 14 (22 Oct 2018 08:12) (14 - 14)  SpO2: 96% (22 Oct 2018 08:12) (96% - 96%)      PHYSICAL EXAM  Constitutional - NAD, Comfortable  HEENT - NCAT, EOMI  Neck - Supple, No limited ROM  Chest - CTA bilaterally, No wheeze, No rhonchi, No crackles  Cardiovascular - RRR, S1S2, No murmurs  Abdomen - BS+, Soft, NTND  Extremities - No C/C/E, No calf tenderness   Skin-no rash  Wounds-na      Neurologic Exam -                 	  HIF  - Awake, Alert, AAO to self, place, date, year, situation  	    No aphasia, normal attention, recollects 2/3 after 5min and 3rd item c clues, no apraxia, agnosia  	   Cranial Nerves - EOMI, No nystagmus/No CAROL, PERRLA, Full VF, Mild NLF decrease on Left, no dysarthria  	   Motor - Right side 5/5 throughout, Left UE 4/5 proximally and distally (clumsy fine motor L fingers), LLE 5/5  	                       	   Sensory - Dimnished/altered left UE/LE sensation as compared to right side.   	   Reflexes - DTR Intact  	   Coordination- Clumsy left hand/dysmetria left  	        	   Balance - impaired         FUNCTIONAL PROGRESS  Gait -   ADLs -   Transfers -  Functional transfer -     RECENT LABS                        13.7   12.8  )-----------( 417      ( 22 Oct 2018 09:56 )             42.0     10-22    138  |  105  |  21  ----------------------------<  113<H>  4.4   |  24  |  1.09    Ca    9.1      22 Oct 2018 09:56    TPro  6.6  /  Alb  2.9<L>  /  TBili  0.3  /  DBili  x   /  AST  11  /  ALT  16  /  AlkPhos  117  10-22      LIVER FUNCTIONS - ( 22 Oct 2018 09:56 )  Alb: 2.9 g/dL / Pro: 6.6 g/dL / ALK PHOS: 117 U/L / ALT: 16 U/L DA / AST: 11 U/L / GGT: x                           RADIOLOGY/OTHER RESULTS      CURRENT MEDICATIONS  MEDICATIONS  (STANDING):  ALPRAZolam 0.25 milliGRAM(s) Oral at bedtime  diltiazem    milliGRAM(s) Oral <User Schedule>  escitalopram 5 milliGRAM(s) Oral daily  hydrALAZINE 25 milliGRAM(s) Oral every 8 hours  lisinopril 20 milliGRAM(s) Oral daily  metoprolol tartrate 25 milliGRAM(s) Oral two times a day    MEDICATIONS  (PRN):  ALPRAZolam 0.25 milliGRAM(s) Oral every 8 hours PRN anxiousness      ASSESSMENT & PLAN      GI/Bowel Management -d/c all laxatives, loose BM overnight   Management - Toilet Q2  Skin - Turn Q2  Pain - Tylenol PRN  DVT PPX - TEDs, SCDs  Diet -reg c thins     Continue comprehensive acute rehab program consisting of 3hrs/day of OT/PT and SLP. CHIEF COMPLAINT: Left foot numbness this am, resolved now.       HISTORY OF PRESENT ILLNESS  69 yo female ex smoker w/hx of lung ca (5/18 VATs/lobectomy), HTN and Afib on Eliquis and ASA presents to [facility] with sudden onset left side weakness and dysarthria. CT head shows acute pontine ICH. CTA head neg stenosis R, Left ICA 50%. . Eliquis held. Hypertensive on admission, BP>200. No neurosurgical intervention. Seen by neurology. BP control. Resume ASA/Eliquis 10-14 days per Neuro plan. Stable for acute inpt rehab. (18 Oct 2018 10:46)      PAST MEDICAL & SURGICAL HISTORY:  Lung cancer  Atrial fibrillation  Endometriosis  Hypertension  Fibroid tumor  History of ovarian cyst        REVIEW OF SYMPTOMS  [X] Constitutional WNL     [X] Cardio WNL            [X] Resp WNL           [X] GI WNL                          [X]  WNL                   [X] Heme WNL              [X] Endo WNL                     [X] Skin WNL                 [X] MSK WNL                               [X] Cognitive WNL           VITALS  Vital Signs Last 24 Hrs  T(C): 36.4 (22 Oct 2018 08:12), Max: 37.2 (21 Oct 2018 21:43)  T(F): 97.6 (22 Oct 2018 08:12), Max: 98.9 (21 Oct 2018 21:43)  HR: 60 (22 Oct 2018 08:12) (60 - 65)  BP: 125/67 (22 Oct 2018 08:12) (125/67 - 145/79)  BP(mean): --  RR: 14 (22 Oct 2018 08:12) (14 - 14)  SpO2: 96% (22 Oct 2018 08:12) (96% - 96%)      PHYSICAL EXAM  Constitutional - NAD, Comfortable  HEENT - NCAT, EOMI  Neck - Supple, No limited ROM  Chest - CTA bilaterally, No wheeze, No rhonchi, No crackles  Cardiovascular - RRR, S1S2, No murmurs  Abdomen - BS+, Soft, NTND  Extremities - No C/C/E, No calf tenderness   Skin-no rash  Wounds-na      Neurologic Exam -                 	  HIF  - Awake, Alert, AAO to self, place, date, year, situation  	    No aphasia, normal attention, recollects 2/3 after 5min and 3rd item c clues, no apraxia, agnosia  	   Cranial Nerves - EOMI, No nystagmus/No CAROL, PERRLA, Full VF, Mild NLF decrease on Left, no dysarthria  	   Motor - Right side 5/5 throughout, Left UE 4/5 proximally and distally (clumsy fine motor L fingers), LLE 5/5  	                       	   Sensory - Dimnished/altered left UE/LE sensation as compared to right side.   	   Reflexes - DTR Intact  	   Coordination- Clumsy left hand/dysmetria left  	        	   Balance - impaired         FUNCTIONAL PROGRESS  Gait - min A 30ft RW  ADLs - min A  Transfers -min a  Functional transfer - min a    RECENT LABS                        13.7   12.8  )-----------( 417      ( 22 Oct 2018 09:56 )             42.0     10-22    138  |  105  |  21  ----------------------------<  113<H>  4.4   |  24  |  1.09    Ca    9.1      22 Oct 2018 09:56    TPro  6.6  /  Alb  2.9<L>  /  TBili  0.3  /  DBili  x   /  AST  11  /  ALT  16  /  AlkPhos  117  10-22      LIVER FUNCTIONS - ( 22 Oct 2018 09:56 )  Alb: 2.9 g/dL / Pro: 6.6 g/dL / ALK PHOS: 117 U/L / ALT: 16 U/L DA / AST: 11 U/L / GGT: x                           RADIOLOGY/OTHER RESULTS      CURRENT MEDICATIONS  MEDICATIONS  (STANDING):  ALPRAZolam 0.25 milliGRAM(s) Oral at bedtime  diltiazem    milliGRAM(s) Oral <User Schedule>  escitalopram 5 milliGRAM(s) Oral daily  hydrALAZINE 25 milliGRAM(s) Oral every 8 hours  lisinopril 20 milliGRAM(s) Oral daily  metoprolol tartrate 25 milliGRAM(s) Oral two times a day    MEDICATIONS  (PRN):  ALPRAZolam 0.25 milliGRAM(s) Oral every 8 hours PRN anxiousness      ASSESSMENT & PLAN      GI/Bowel Management -d/c all laxatives, loose BM overnight   Management - Toilet Q2  Skin - Turn Q2  Pain - Tylenol PRN  DVT PPX - TEDs, SCDs  Diet -reg c thins     Continue comprehensive acute rehab program consisting of 3hrs/day of OT/PT and SLP.

## 2018-10-23 PROCEDURE — 99232 SBSQ HOSP IP/OBS MODERATE 35: CPT

## 2018-10-23 RX ADMIN — Medication 0.25 MILLIGRAM(S): at 20:20

## 2018-10-23 RX ADMIN — LISINOPRIL 20 MILLIGRAM(S): 2.5 TABLET ORAL at 05:32

## 2018-10-23 RX ADMIN — ESCITALOPRAM OXALATE 5 MILLIGRAM(S): 10 TABLET, FILM COATED ORAL at 12:08

## 2018-10-23 RX ADMIN — Medication 25 MILLIGRAM(S): at 05:32

## 2018-10-23 RX ADMIN — Medication 25 MILLIGRAM(S): at 13:32

## 2018-10-23 RX ADMIN — Medication 25 MILLIGRAM(S): at 20:20

## 2018-10-23 RX ADMIN — Medication 25 MILLIGRAM(S): at 16:50

## 2018-10-23 RX ADMIN — Medication 240 MILLIGRAM(S): at 20:20

## 2018-10-23 NOTE — PROGRESS NOTE ADULT - SUBJECTIVE AND OBJECTIVE BOX
CHIEF COMPLAINT: No acute events overnight, afebrile, denies any cough or dysuria. NAD.       HISTORY OF PRESENT ILLNESS  69 yo female ex smoker w/hx of lung ca (5/18 VATs/lobectomy), HTN and Afib on Eliquis and ASA presents to [facility] with sudden onset left side weakness and dysarthria. CT head shows acute pontine ICH. CTA head neg stenosis R, Left ICA 50%. . Eliquis held. Hypertensive on admission, BP>200. No neurosurgical intervention. Seen by neurology. BP control. Resume ASA/Eliquis 10-14 days per Neuro plan. Stable for acute inpt rehab. (18 Oct 2018 10:46)      PAST MEDICAL & SURGICAL HISTORY:  Lung cancer  Atrial fibrillation  Endometriosis  Hypertension  Fibroid tumor  History of ovarian cyst-        REVIEW OF SYMPTOMS  [X] Constitutional WNL     [X] Cardio WNL            [X] Resp WNL           [X] GI WNL                          [X]  WNL                   [X] Heme WNL              [X] Endo WNL                     [X] Skin WNL                 [X] MSK WNL                    [X] Cognitive WNL              VITALS  Vital Signs Last 24 Hrs  T(C): 36.3 (23 Oct 2018 07:24), Max: 36.9 (22 Oct 2018 21:11)  T(F): 97.3 (23 Oct 2018 07:24), Max: 98.5 (22 Oct 2018 21:11)  HR: 64 (23 Oct 2018 07:24) (64 - 75)  BP: 127/68 (23 Oct 2018 07:24) (127/68 - 149/83)  BP(mean): --  RR: 15 (23 Oct 2018 07:24) (14 - 15)  SpO2: 96% (23 Oct 2018 07:24) (96% - 96%)      PHYSICAL EXAM  Constitutional - NAD, Comfortable  HEENT - NCAT, EOMI  Neck - Supple, No limited ROM  Chest - CTA bilaterally, No wheeze, No rhonchi, No crackles  Cardiovascular - RRR, S1S2, No murmurs  Abdomen - BS+, Soft, NTND  Extremities - No C/C/E, No calf tenderness   Skin-no rash  Wounds-na      Neurologic Exam - No new deficits.     FUNCTIONAL PROGRESS  Gait - min A   ADLs - min A  Transfers -min a  Functional transfer - min a    RECENT LABS                        13.7   12.8  )-----------( 417      ( 22 Oct 2018 09:56 )             42.0     10-22    138  |  105  |  21  ----------------------------<  113<H>  4.4   |  24  |  1.09    Ca    9.1      22 Oct 2018 09:56    TPro  6.6  /  Alb  2.9<L>  /  TBili  0.3  /  DBili  x   /  AST  11  /  ALT  16  /  AlkPhos  117  10-22      LIVER FUNCTIONS - ( 22 Oct 2018 09:56 )  Alb: 2.9 g/dL / Pro: 6.6 g/dL / ALK PHOS: 117 U/L / ALT: 16 U/L DA / AST: 11 U/L / GGT: x                           RADIOLOGY/OTHER RESULTS      CURRENT MEDICATIONS  MEDICATIONS  (STANDING):  ALPRAZolam 0.25 milliGRAM(s) Oral at bedtime  diltiazem    milliGRAM(s) Oral <User Schedule>  escitalopram 5 milliGRAM(s) Oral daily  hydrALAZINE 25 milliGRAM(s) Oral every 8 hours  lisinopril 20 milliGRAM(s) Oral daily  metoprolol tartrate 25 milliGRAM(s) Oral two times a day    MEDICATIONS  (PRN):  ALPRAZolam 0.25 milliGRAM(s) Oral every 8 hours PRN anxiousness      ASSESSMENT & PLAN      GI/Bowel Management -d/c all laxatives, loose BM overnight   Management - Toilet Q2  Skin - Turn Q2  Pain - Tylenol PRN  DVT PPX - TEDs, SCDs  Diet -reg c thins     Continue comprehensive acute rehab program consisting of 3hrs/day of OT/PT and SLP.

## 2018-10-24 LAB
ALBUMIN SERPL ELPH-MCNC: 2.5 G/DL — LOW (ref 3.3–5)
ALP SERPL-CCNC: 98 U/L — SIGNIFICANT CHANGE UP (ref 40–120)
ALT FLD-CCNC: 13 U/L DA — SIGNIFICANT CHANGE UP (ref 10–45)
ANION GAP SERPL CALC-SCNC: 10 MMOL/L — SIGNIFICANT CHANGE UP (ref 5–17)
AST SERPL-CCNC: 8 U/L — LOW (ref 10–40)
BILIRUB SERPL-MCNC: 0.2 MG/DL — SIGNIFICANT CHANGE UP (ref 0.2–1.2)
BUN SERPL-MCNC: 22 MG/DL — SIGNIFICANT CHANGE UP (ref 7–23)
CALCIUM SERPL-MCNC: 8.5 MG/DL — SIGNIFICANT CHANGE UP (ref 8.4–10.5)
CHLORIDE SERPL-SCNC: 106 MMOL/L — SIGNIFICANT CHANGE UP (ref 96–108)
CO2 SERPL-SCNC: 23 MMOL/L — SIGNIFICANT CHANGE UP (ref 22–31)
CREAT SERPL-MCNC: 0.94 MG/DL — SIGNIFICANT CHANGE UP (ref 0.5–1.3)
GLUCOSE SERPL-MCNC: 102 MG/DL — HIGH (ref 70–99)
HCT VFR BLD CALC: 38.6 % — SIGNIFICANT CHANGE UP (ref 34.5–45)
HGB BLD-MCNC: 12.6 G/DL — SIGNIFICANT CHANGE UP (ref 11.5–15.5)
MCHC RBC-ENTMCNC: 28.9 PG — SIGNIFICANT CHANGE UP (ref 27–34)
MCHC RBC-ENTMCNC: 32.7 GM/DL — SIGNIFICANT CHANGE UP (ref 32–36)
MCV RBC AUTO: 88.4 FL — SIGNIFICANT CHANGE UP (ref 80–100)
PLATELET # BLD AUTO: 349 K/UL — SIGNIFICANT CHANGE UP (ref 150–400)
POTASSIUM SERPL-MCNC: 4.1 MMOL/L — SIGNIFICANT CHANGE UP (ref 3.5–5.3)
POTASSIUM SERPL-SCNC: 4.1 MMOL/L — SIGNIFICANT CHANGE UP (ref 3.5–5.3)
PROT SERPL-MCNC: 5.9 G/DL — LOW (ref 6–8.3)
RBC # BLD: 4.36 M/UL — SIGNIFICANT CHANGE UP (ref 3.8–5.2)
RBC # FLD: 12.6 % — SIGNIFICANT CHANGE UP (ref 10.3–14.5)
SODIUM SERPL-SCNC: 139 MMOL/L — SIGNIFICANT CHANGE UP (ref 135–145)
WBC # BLD: 12.5 K/UL — HIGH (ref 3.8–10.5)
WBC # FLD AUTO: 12.5 K/UL — HIGH (ref 3.8–10.5)

## 2018-10-24 PROCEDURE — 99233 SBSQ HOSP IP/OBS HIGH 50: CPT

## 2018-10-24 PROCEDURE — 99232 SBSQ HOSP IP/OBS MODERATE 35: CPT

## 2018-10-24 RX ORDER — ALPRAZOLAM 0.25 MG
0.12 TABLET ORAL EVERY 6 HOURS
Qty: 0 | Refills: 0 | Status: DISCONTINUED | OUTPATIENT
Start: 2018-10-24 | End: 2018-10-30

## 2018-10-24 RX ORDER — ESCITALOPRAM OXALATE 10 MG/1
10 TABLET, FILM COATED ORAL DAILY
Qty: 0 | Refills: 0 | Status: DISCONTINUED | OUTPATIENT
Start: 2018-10-25 | End: 2018-11-02

## 2018-10-24 RX ADMIN — Medication 25 MILLIGRAM(S): at 20:00

## 2018-10-24 RX ADMIN — Medication 240 MILLIGRAM(S): at 20:00

## 2018-10-24 RX ADMIN — Medication 25 MILLIGRAM(S): at 05:23

## 2018-10-24 RX ADMIN — ESCITALOPRAM OXALATE 5 MILLIGRAM(S): 10 TABLET, FILM COATED ORAL at 12:17

## 2018-10-24 RX ADMIN — Medication 25 MILLIGRAM(S): at 17:22

## 2018-10-24 RX ADMIN — Medication 0.25 MILLIGRAM(S): at 20:00

## 2018-10-24 RX ADMIN — Medication 25 MILLIGRAM(S): at 13:22

## 2018-10-24 RX ADMIN — LISINOPRIL 20 MILLIGRAM(S): 2.5 TABLET ORAL at 05:23

## 2018-10-24 NOTE — PROGRESS NOTE ADULT - PROBLEM SELECTOR PLAN 3
Elevated, will follow medicine's recommendations. Better controlled, will follow medicine's recommendations.

## 2018-10-24 NOTE — PROGRESS NOTE ADULT - ATTENDING COMMENTS
Patient medically stable. Making progress towards rehab goals.   Continue rehab program.     Multidisciplinary team meeting today:  patient's functional goals and needs, functional and clinical  progress were discussed, barriers to discharge were identified. Anticipate discharge home with home care, 24/7 supervision for safety,  EDOD  11/7/18

## 2018-10-24 NOTE — PROGRESS NOTE ADULT - SUBJECTIVE AND OBJECTIVE BOX
CHIEF COMPLAINT: Severe anxiety in therapy reported. No new neuro deficits. Denies dysuria or pain.       HISTORY OF PRESENT ILLNESS  67 yo female ex smoker w/hx of lung ca (5/18 VATs/lobectomy), HTN and Afib on Eliquis and ASA presents to [facility] with sudden onset left side weakness and dysarthria. CT head shows acute pontine ICH. CTA head neg stenosis R, Left ICA 50%. . Eliquis held. Hypertensive on admission, BP>200. No neurosurgical intervention. Seen by neurology. BP control. Resume ASA/Eliquis 10-14 days per Neuro plan. Stable for acute inpt rehab. (18 Oct 2018 10:46)      PAST MEDICAL & SURGICAL HISTORY:  Lung cancer  Atrial fibrillation  Endometriosis  Hypertension  Fibroid tumor  History of ovarian cyst       REVIEW OF SYMPTOMS  [X] Constitutional WNL     [X] Cardio WNL            [X] Resp WNL           [X] GI WNL                          [X]  WNL                   [X] Heme WNL              [X] Endo WNL                     [X] Skin WNL                 [X] MSK WNL                             [X] Cognitive WNL              VITALS  Vital Signs Last 24 Hrs  T(C): 36.9 (24 Oct 2018 08:09), Max: 37 (23 Oct 2018 20:17)  T(F): 98.4 (24 Oct 2018 08:09), Max: 98.6 (23 Oct 2018 20:17)  HR: 63 (24 Oct 2018 08:09) (63 - 65)  BP: 136/74 (24 Oct 2018 08:09) (125/76 - 142/84)  BP(mean): --  RR: 14 (24 Oct 2018 08:09) (14 - 14)  SpO2: 96% (24 Oct 2018 08:09) (96% - 96%)      PHYSICAL EXAM  Constitutional - NAD, Comfortable  HEENT - NCAT, EOMI  Neck - Supple, No limited ROM  Chest - CTA bilaterally, No wheeze, No rhonchi, No crackles  Cardiovascular - RRR, S1S2, No murmurs  Abdomen - BS+, Soft, NTND  Extremities - No C/C/E, No calf tenderness   Skin-no rash  Wounds-none      Neurologic Exam -  no new deficits.                         FUNCTIONAL PROGRESS  Gait - min A 75ft  ADLs - min/CG A  Transfers - min/mod A  Functional transfer - min A    RECENT LABS                        12.6   12.5  )-----------( 349      ( 24 Oct 2018 06:05 )             38.6     10-24    139  |  106  |  22  ----------------------------<  102<H>  4.1   |  23  |  0.94    Ca    8.5      24 Oct 2018 06:05    TPro  5.9<L>  /  Alb  2.5<L>  /  TBili  0.2  /  DBili  x   /  AST  8<L>  /  ALT  13  /  AlkPhos  98  10-24      LIVER FUNCTIONS - ( 24 Oct 2018 06:05 )  Alb: 2.5 g/dL / Pro: 5.9 g/dL / ALK PHOS: 98 U/L / ALT: 13 U/L DA / AST: 8 U/L / GGT: x                           RADIOLOGY/OTHER RESULTS      CURRENT MEDICATIONS  MEDICATIONS  (STANDING):  ALPRAZolam 0.25 milliGRAM(s) Oral at bedtime  diltiazem    milliGRAM(s) Oral <User Schedule>  escitalopram 10 milliGRAM(s) Oral daily  hydrALAZINE 25 milliGRAM(s) Oral every 8 hours  lisinopril 20 milliGRAM(s) Oral daily  metoprolol tartrate 25 milliGRAM(s) Oral two times a day    MEDICATIONS  (PRN):  ALPRAZolam 0.125 milliGRAM(s) Oral every 6 hours PRN anxiety      ASSESSMENT & PLAN    GI/Bowel Management -d/c all laxatives   Management - Toilet Q2  Skin - Turn Q2  Pain - Tylenol PRN  DVT PPX - TEDs, SCDs  Diet -reg c thins       Continue comprehensive acute rehab program consisting of 3hrs/day of OT/PT and SLP.

## 2018-10-24 NOTE — PROGRESS NOTE ADULT - SUBJECTIVE AND OBJECTIVE BOX
Psychiatry Consultation-Liaison Follow-up Note  68y  Encounter: 10/24/2018  Chart reviewed,  	  Met with-patient  Case Discussed with-nursing    Interval History:      Symptom progression-    Medication Changes-  ALPRAZolam 0.25 milliGRAM(s) Oral at bedtime  ALPRAZolam 0.125 milliGRAM(s) Oral every 6 hours PRN  diltiazem    milliGRAM(s) Oral <User Schedule>  escitalopram 10 milliGRAM(s) Oral daily  hydrALAZINE 25 milliGRAM(s) Oral every 8 hours  lisinopril 20 milliGRAM(s) Oral daily  metoprolol tartrate 25 milliGRAM(s) Oral two times a day    Mental Status Examination:      Studies:    Laboratory testing-                        12.6   12.5  )-----------( 349      ( 24 Oct 2018 06:05 )             38.6     10-24    139  |  106  |  22  ----------------------------<  102<H>  4.1   |  23  |  0.94    Ca    8.5      24 Oct 2018 06:05    TPro  5.9<L>  /  Alb  2.5<L>  /  TBili  0.2  /  DBili  x   /  AST  8<L>  /  ALT  13  /  AlkPhos  98  10-24  LIVER FUNCTIONS - ( 24 Oct 2018 06:05 )  Alb: 2.5 g/dL / Pro: 5.9 g/dL / ALK PHOS: 98 U/L / ALT: 13 U/L DA / AST: 8 U/L / GGT: x             Assessment:    Recommendations:      Medication-       Hospital course Follow-up Psychiatry Consultation-Liaison Follow-up Note  68y  Encounter: 10/24/2018  Chart reviewed,  	  Met with-patient  Case Discussed with-nursing    CC: " I get frustrated and anxious that my left leg doesn't move like it should."    History of Present Illness:   67 yo female ex smoker w/hx of lung ca (5/18 VATs/lobectomy), HTN and Afib on Eliquis and ASA presents to [facility] with sudden onset left side weakness and dysarthria. CT head shows acute pontine ICH. CTA head neg stenosis R, Left ICA 50%. . Eliquis held. Hypertensive on admission, BP>200. No neurosurgical intervention. Seen by neurology. BP control. Resume ASA/Eliquis 10-14 days per Neuro plan. Stable for acute inpt rehab. (18 Oct 2018 10:46)    Chief Complaint/Reason for Consult:  Psychiatry consulted due to concern regarding tearfulness, anxiety, depression, insomnia, adjustment to overall major change in health status since having lung cancer and   a CVA. Pt concerned with loss of income, loss of job, being able to pay bills, realistic worries about her future. Pt presents today reporting restful sleep last night with less tearfulness and feeling calmer.  Continues to voice concerns over medical condition and future disability as well as her ability to carry on communication via e-mail in hospital with poor Wi Fi access in room.      Health Issues: Cerebral infarction    Family history of premature CAD (Sibling)  Family history of hypertension (Sibling)  Family history of heart disease  Lung cancer  Atrial fibrillation  Endometriosis  Hypertension  Leukocytosis  Diarrhea  Fibroid tumor  History of ovarian cyst    Psychiatric Review of Systems: Less tearful, feels calmer presently.  Reports to have had a restful sleep last night.  States " I get frustrated and anxious when I can't move my left leg like I want to.  The therapist's think I should take the Xanax during the day to feel calmer but I'm afraid I won't have energy to participate in therapy."         Allergies: No Known Allergies    Current Mental Status Exam:  Appearance:- well groomed blonde female, wears glasses, sitting up in wheelchair.   Attitude: - cooperative.   Gait/Station: - per physiatry or PT notes.  Motor Activity: - calm.  Affect: - appropriate.  Mood: -mildly anxious.   Speech: -normal rate tone and volume.   Thought process: -intact, coherent and goal directed.   Thought content/perceptions: linear, no abnormalities.   Hallucinations:  not present.  Delusions: not present.   Suicidal ideation: denied  Homicidal ideation:   denied  Sensorium: alert.   Orientation: X3  Attention: intact   Concentration: intact grossly   Memory: intact  Insight/Judgment: Good    Medication Changes-  ALPRAZolam 0.25 milliGRAM(s) Oral at bedtime  ALPRAZolam 0.125 milliGRAM(s) Oral every 6 hours PRN  diltiazem    milliGRAM(s) Oral <User Schedule>  escitalopram 10 milliGRAM(s) Oral daily  hydrALAZINE 25 milliGRAM(s) Oral every 8 hours  lisinopril 20 milliGRAM(s) Oral daily  metoprolol tartrate 25 milliGRAM(s) Oral two times a day      Laboratory testing-                        12.6   12.5  )-----------( 349      ( 24 Oct 2018 06:05 )             38.6     10-24    139  |  106  |  22  ----------------------------<  102<H>  4.1   |  23  |  0.94    Ca    8.5      24 Oct 2018 06:05    TPro  5.9<L>  /  Alb  2.5<L>  /  TBili  0.2  /  DBili  x   /  AST  8<L>  /  ALT  13  /  AlkPhos  98  10-24  LIVER FUNCTIONS - ( 24 Oct 2018 06:05 )  Alb: 2.5 g/dL / Pro: 5.9 g/dL / ALK PHOS: 98 U/L / ALT: 13 U/L DA / AST: 8 U/L / GGT: x           Assessment and Recommendation:    Increase Lexapro to 10 mg daily, continue to monitor response.  Continue 0.25 mg po QHS & change prn dose to 0.125mg po Q6 H as needed for anxiousness.     Follow up status: will continue to follow with you. Psychiatry Consultation-Liaison Follow-up Note  68y  Encounter: 10/24/2018  Chart reviewed,  	  Met with-patient  Case Discussed with-nursing    CC: " I get frustrated and anxious that my left leg doesn't move like it should."    History of Present Illness:   69 yo female ex smoker w/hx of lung ca (5/18 VATs/lobectomy), HTN and Afib on Eliquis and ASA presents to [facility] with sudden onset left side weakness and dysarthria. CT head shows acute pontine ICH. CTA head neg stenosis R, Left ICA 50%. . Eliquis held. Hypertensive on admission, BP>200. No neurosurgical intervention. Seen by neurology. BP control. Resume ASA/Eliquis 10-14 days per Neuro plan. Stable for acute inpt rehab. (18 Oct 2018 10:46)    Chief Complaint/Reason for Consult:  Psychiatry consulted due to concern regarding tearfulness, anxiety, depression, insomnia, adjustment to overall major change in health status since having lung cancer and   a CVA. Pt concerned with loss of income, loss of job, being able to pay bills, realistic worries about her future. Pt presents today reporting restful sleep last night with less tearfulness and feeling calmer.  Continues to voice concerns over medical condition and future disability as well as her ability to carry on communication via e-mail in hospital with poor Wi Fi access in room.      Health Issues: Cerebral infarction    Family history of premature CAD (Sibling)  Family history of hypertension (Sibling)  Family history of heart disease  Lung cancer  Atrial fibrillation  Endometriosis  Hypertension  Leukocytosis  Diarrhea  Fibroid tumor  History of ovarian cyst    Psychiatric Review of Systems: Less tearful, feels calmer presently.  Reports to have had a restful sleep last night.  States " I get frustrated and anxious when I can't move my left leg like I want to.  The therapist's think I should take the Xanax during the day to feel calmer but I'm afraid I won't have energy to participate in therapy."         Allergies: No Known Allergies    Current Mental Status Exam:  Appearance:- well groomed blonde female, wears glasses, sitting up in wheelchair.   Attitude: - cooperative.   Gait/Station: - per physiatry or PT notes.  Motor Activity: - calm.  Affect: - appropriate.  Mood: -mildly anxious.   Speech: -normal rate tone and volume.   Thought process: -intact, coherent and goal directed.   Thought content/perceptions: linear, no abnormalities.   Hallucinations:  not present.  Delusions: not present.   Suicidal ideation: denied  Homicidal ideation:   denied  Sensorium: alert.   Orientation: X3  Attention: intact   Concentration: intact grossly   Memory: intact  Insight/Judgment: Good    Medication Changes-  ALPRAZolam 0.25 milliGRAM(s) Oral at bedtime  ALPRAZolam 0.125 milliGRAM(s) Oral every 6 hours PRN  diltiazem    milliGRAM(s) Oral <User Schedule>  escitalopram 10 milliGRAM(s) Oral daily  hydrALAZINE 25 milliGRAM(s) Oral every 8 hours  lisinopril 20 milliGRAM(s) Oral daily  metoprolol tartrate 25 milliGRAM(s) Oral two times a day      Laboratory testing-                        12.6   12.5  )-----------( 349      ( 24 Oct 2018 06:05 )             38.6     10-24    139  |  106  |  22  ----------------------------<  102<H>  4.1   |  23  |  0.94    Ca    8.5      24 Oct 2018 06:05    TPro  5.9<L>  /  Alb  2.5<L>  /  TBili  0.2  /  DBili  x   /  AST  8<L>  /  ALT  13  /  AlkPhos  98  10-24  LIVER FUNCTIONS - ( 24 Oct 2018 06:05 )  Alb: 2.5 g/dL / Pro: 5.9 g/dL / ALK PHOS: 98 U/L / ALT: 13 U/L DA / AST: 8 U/L / GGT: x           Assessment and Recommendation:    Increase Lexapro to 10 mg daily, continue to monitor response.  Continue 0.25 mg po QHS & change prn dose to 0.125mg po Q6 H as needed for anxiousness.     Discussed with patient her frustration and difficulty she feels in allowing her body time to heal after insult.  Is aware of gains she has made with her therapy sessions, but feels anxious knowing there are tasks in need of her attention related to her job & family responsibilities.  Reports she is committed to putting in time and effort required to heal. Support and encouragement provided.  Informed & transported patient to computers that are available for patient use.          Follow up status: will continue to follow with you.

## 2018-10-25 PROCEDURE — 99232 SBSQ HOSP IP/OBS MODERATE 35: CPT

## 2018-10-25 RX ADMIN — Medication 25 MILLIGRAM(S): at 05:44

## 2018-10-25 RX ADMIN — Medication 25 MILLIGRAM(S): at 21:11

## 2018-10-25 RX ADMIN — ESCITALOPRAM OXALATE 10 MILLIGRAM(S): 10 TABLET, FILM COATED ORAL at 12:21

## 2018-10-25 RX ADMIN — Medication 25 MILLIGRAM(S): at 18:40

## 2018-10-25 RX ADMIN — Medication 0.25 MILLIGRAM(S): at 21:11

## 2018-10-25 RX ADMIN — LISINOPRIL 20 MILLIGRAM(S): 2.5 TABLET ORAL at 05:44

## 2018-10-25 RX ADMIN — Medication 25 MILLIGRAM(S): at 15:05

## 2018-10-25 RX ADMIN — Medication 240 MILLIGRAM(S): at 21:12

## 2018-10-25 NOTE — PROGRESS NOTE ADULT - SUBJECTIVE AND OBJECTIVE BOX
CHIEF COMPLAINT: Poor sleep last night, denies any pain or vision changes, no diarrhea, no dysuria.       HISTORY OF PRESENT ILLNESS  67 yo female ex smoker w/hx of lung ca (5/18 VATs/lobectomy), HTN and Afib on Eliquis and ASA presents to [facility] with sudden onset left side weakness and dysarthria. CT head shows acute pontine ICH. CTA head neg stenosis R, Left ICA 50%. . Eliquis held. Hypertensive on admission, BP>200. No neurosurgical intervention. Seen by neurology. BP control. Resume ASA/Eliquis 10-14 days per Neuro plan. Stable for acute inpt rehab. (18 Oct 2018 10:46)      PAST MEDICAL & SURGICAL HISTORY:  Lung cancer  Atrial fibrillation  Endometriosis  Hypertension  Fibroid tumor  History of ovarian cyst        REVIEW OF SYMPTOMS  [X] Constitutional WNL     [X] Cardio WNL            [X] Resp WNL           [X] GI WNL                          [X]  WNL                   [X] Heme WNL              [X] Endo WNL                     [X] Skin WNL                 [X] MSK WNL                            [X] Cognitive WNL          VITALS  Vital Signs Last 24 Hrs  T(C): 36.4 (25 Oct 2018 08:12), Max: 36.7 (24 Oct 2018 19:53)  T(F): 97.6 (25 Oct 2018 08:12), Max: 98.1 (24 Oct 2018 19:53)  HR: 87 (25 Oct 2018 08:12) (60 - 87)  BP: 137/75 (25 Oct 2018 08:12) (118/66 - 158/85)  BP(mean): --  RR: 14 (25 Oct 2018 08:12) (14 - 14)  SpO2: 95% (25 Oct 2018 08:12) (95% - 95%)      PHYSICAL EXAM  Constitutional - NAD  HEENT - NCAT, EOMI  Neck - Supple, No limited ROM  Chest - CTA bilaterally, No wheeze, No rhonchi, No crackles  Cardiovascular - RRR, S1S2, No murmurs  Abdomen - BS+, Soft, NTND  Extremities - No C/C/E, No calf tenderness   Skin-no rash  Wounds-na      Neurologic Exam -  no new deficits.                         FUNCTIONAL PROGRESS  Gait - min A 75ft  ADLs - min/CG A  Transfers - min/mod A  Functional transfer - min A      RECENT LABS                        12.6   12.5  )-----------( 349      ( 24 Oct 2018 06:05 )             38.6     10-24    139  |  106  |  22  ----------------------------<  102<H>  4.1   |  23  |  0.94    Ca    8.5      24 Oct 2018 06:05    TPro  5.9<L>  /  Alb  2.5<L>  /  TBili  0.2  /  DBili  x   /  AST  8<L>  /  ALT  13  /  AlkPhos  98  10-24      LIVER FUNCTIONS - ( 24 Oct 2018 06:05 )  Alb: 2.5 g/dL / Pro: 5.9 g/dL / ALK PHOS: 98 U/L / ALT: 13 U/L DA / AST: 8 U/L / GGT: x                           RADIOLOGY/OTHER RESULTS      CURRENT MEDICATIONS  MEDICATIONS  (STANDING):  ALPRAZolam 0.25 milliGRAM(s) Oral at bedtime  diltiazem    milliGRAM(s) Oral <User Schedule>  escitalopram 10 milliGRAM(s) Oral daily  hydrALAZINE 25 milliGRAM(s) Oral every 8 hours  lisinopril 20 milliGRAM(s) Oral daily  metoprolol tartrate 25 milliGRAM(s) Oral two times a day    MEDICATIONS  (PRN):  ALPRAZolam 0.125 milliGRAM(s) Oral every 6 hours PRN anxiety      ASSESSMENT & PLAN      GI/Bowel Management -d/c all laxatives   Management - Toilet Q2  Skin - Turn Q2  Pain - Tylenol PRN  DVT PPX - TEDs, SCDs  Diet -reg c thins       Continue comprehensive acute rehab program consisting of 3hrs/day of OT/PT and SLP.

## 2018-10-25 NOTE — PROGRESS NOTE ADULT - REASON FOR ADMISSION
s/p right pontine ICH with left sided weakness and functional deficits. s/p right pontine ICH with left sided weakness, dysarthria, gait disorder.

## 2018-10-26 LAB
ALBUMIN SERPL ELPH-MCNC: 2.6 G/DL — LOW (ref 3.3–5)
ALP SERPL-CCNC: 103 U/L — SIGNIFICANT CHANGE UP (ref 40–120)
ALT FLD-CCNC: 14 U/L DA — SIGNIFICANT CHANGE UP (ref 10–45)
ANION GAP SERPL CALC-SCNC: 8 MMOL/L — SIGNIFICANT CHANGE UP (ref 5–17)
AST SERPL-CCNC: 14 U/L — SIGNIFICANT CHANGE UP (ref 10–40)
BILIRUB SERPL-MCNC: 0.3 MG/DL — SIGNIFICANT CHANGE UP (ref 0.2–1.2)
BUN SERPL-MCNC: 17 MG/DL — SIGNIFICANT CHANGE UP (ref 7–23)
CALCIUM SERPL-MCNC: 8.8 MG/DL — SIGNIFICANT CHANGE UP (ref 8.4–10.5)
CHLORIDE SERPL-SCNC: 106 MMOL/L — SIGNIFICANT CHANGE UP (ref 96–108)
CO2 SERPL-SCNC: 25 MMOL/L — SIGNIFICANT CHANGE UP (ref 22–31)
CREAT SERPL-MCNC: 1 MG/DL — SIGNIFICANT CHANGE UP (ref 0.5–1.3)
GLUCOSE SERPL-MCNC: 85 MG/DL — SIGNIFICANT CHANGE UP (ref 70–99)
HCT VFR BLD CALC: 36.9 % — SIGNIFICANT CHANGE UP (ref 34.5–45)
HGB BLD-MCNC: 12.4 G/DL — SIGNIFICANT CHANGE UP (ref 11.5–15.5)
MCHC RBC-ENTMCNC: 29.9 PG — SIGNIFICANT CHANGE UP (ref 27–34)
MCHC RBC-ENTMCNC: 33.6 GM/DL — SIGNIFICANT CHANGE UP (ref 32–36)
MCV RBC AUTO: 89 FL — SIGNIFICANT CHANGE UP (ref 80–100)
PLATELET # BLD AUTO: 394 K/UL — SIGNIFICANT CHANGE UP (ref 150–400)
POTASSIUM SERPL-MCNC: 4.4 MMOL/L — SIGNIFICANT CHANGE UP (ref 3.5–5.3)
POTASSIUM SERPL-SCNC: 4.4 MMOL/L — SIGNIFICANT CHANGE UP (ref 3.5–5.3)
PROT SERPL-MCNC: 6 G/DL — SIGNIFICANT CHANGE UP (ref 6–8.3)
RBC # BLD: 4.15 M/UL — SIGNIFICANT CHANGE UP (ref 3.8–5.2)
RBC # FLD: 12.3 % — SIGNIFICANT CHANGE UP (ref 10.3–14.5)
SODIUM SERPL-SCNC: 139 MMOL/L — SIGNIFICANT CHANGE UP (ref 135–145)
WBC # BLD: 12.2 K/UL — HIGH (ref 3.8–10.5)
WBC # FLD AUTO: 12.2 K/UL — HIGH (ref 3.8–10.5)

## 2018-10-26 PROCEDURE — 99232 SBSQ HOSP IP/OBS MODERATE 35: CPT

## 2018-10-26 RX ADMIN — Medication 25 MILLIGRAM(S): at 06:29

## 2018-10-26 RX ADMIN — Medication 25 MILLIGRAM(S): at 14:04

## 2018-10-26 RX ADMIN — Medication 240 MILLIGRAM(S): at 21:14

## 2018-10-26 RX ADMIN — Medication 25 MILLIGRAM(S): at 06:30

## 2018-10-26 RX ADMIN — Medication 25 MILLIGRAM(S): at 17:44

## 2018-10-26 RX ADMIN — Medication 25 MILLIGRAM(S): at 21:14

## 2018-10-26 RX ADMIN — LISINOPRIL 20 MILLIGRAM(S): 2.5 TABLET ORAL at 06:29

## 2018-10-26 RX ADMIN — ESCITALOPRAM OXALATE 10 MILLIGRAM(S): 10 TABLET, FILM COATED ORAL at 12:21

## 2018-10-26 NOTE — PROGRESS NOTE ADULT - SUBJECTIVE AND OBJECTIVE BOX
Patient is a 68y old Female who presents with a chief complaint of s/p right pontine ICH with left sided weakness and functional deficits. (26 Oct 2018 11:26)    Patient seen and examined at bedside.    ALLERGIES:  No Known Allergies    MEDICATIONS  (STANDING):  diltiazem    milliGRAM(s) Oral <User Schedule>  escitalopram 10 milliGRAM(s) Oral daily  hydrALAZINE 25 milliGRAM(s) Oral every 8 hours  lisinopril 20 milliGRAM(s) Oral daily  metoprolol tartrate 25 milliGRAM(s) Oral two times a day    MEDICATIONS  (PRN):  ALPRAZolam 0.125 milliGRAM(s) Oral every 6 hours PRN anxiety    Vital Signs Last 24 Hrs  T(F): 98.2 (26 Oct 2018 08:35), Max: 98.8 (25 Oct 2018 21:05)  HR: 58 (26 Oct 2018 08:35) (58 - 68)  BP: 132/79 (26 Oct 2018 08:35) (132/79 - 149/72)  RR: 14 (26 Oct 2018 08:35) (14 - 14)  SpO2: 96% (26 Oct 2018 08:35) (95% - 97%)  I&O's Summary    25 Oct 2018 07:01  -  26 Oct 2018 07:00  --------------------------------------------------------  IN: 711 mL / OUT: 0 mL / NET: 711 mL      PHYSICAL EXAM:  General: NAD, A/O x 3  ENT: MMM  Neck: Supple, No JVD  Lungs: Clear to auscultation bilaterally although breath sounds seem distant  Cardio: RRR, S1/S2, No murmurs  Abdomen: Soft, Nontender, Nondistended; Bowel sounds present  Extremities: No calf tenderness, No pitting edema    LABS:                        12.4   12.2  )-----------( 394      ( 26 Oct 2018 05:20 )             36.9     10-26    139  |  106  |  17  ----------------------------<  85  4.4   |  25  |  1.00    Ca    8.8      26 Oct 2018 05:20    TPro  6.0  /  Alb  2.6  /  TBili  0.3  /  DBili  x   /  AST  14  /  ALT  14  /  AlkPhos  103  10-26    eGFR if : 67 mL/min/1.73M2 (10-26-18 @ 05:20)  eGFR if Non African American: 58 mL/min/1.73M2 (10-26-18 @ 05:20)

## 2018-10-26 NOTE — CHART NOTE - NSCHARTNOTEFT_GEN_A_CORE
NUTRITION FOLLOW UP    SOURCE: Patient/ Medical record    DIET: Regular    Patient receiving a regular diet , noted with a good po intake 100% as per flow sheet. No GI  distress noted (+) BM (10/23) .     CURRENT WEIGHT:  - No follow up weight since admission., no edema noted.      PERTINENT MEDS:   Pertinent Medications: MEDICATIONS  (STANDING):  diltiazem    milliGRAM(s) Oral <User Schedule>  escitalopram 10 milliGRAM(s) Oral daily  hydrALAZINE 25 milliGRAM(s) Oral every 8 hours  lisinopril 20 milliGRAM(s) Oral daily  metoprolol tartrate 25 milliGRAM(s) Oral two times a day    MEDICATIONS  (PRN):  ALPRAZolam 0.125 milliGRAM(s) Oral every 6 hours PRN anxiety      PERTINENT LABS:  Date: 26 Oct 2018 05:20  Hemoglobin 12.4   Hematocrit 36.9     Date: 10-26  Sodium 139  Potassium 4.4  Glucose Serum 85  BUN 17  Creatinine 1.00          SKIN: intact    ESTIMATED NEEDS:   [X] no change since previous assessment  [ ] recalculated:     PREVIOUS NUTRITION DIAGNOSIS:no needs identified      MONITORING AND EVALUATION:   Current diet order is appropriate and is well tolerated, but will monitor for any changes that may be needed and follow clinical course.        NUTRITION RECOMMENDATIONS: Continue current diet regimen.

## 2018-10-26 NOTE — PROGRESS NOTE ADULT - SUBJECTIVE AND OBJECTIVE BOX
CHIEF COMPLAINT: No events overnight.       HISTORY OF PRESENT ILLNESS  67 yo female ex smoker w/hx of lung ca (5/18 VATs/lobectomy), HTN and Afib on Eliquis and ASA presents to [facility] with sudden onset left side weakness and dysarthria. CT head shows acute pontine ICH. CTA head neg stenosis R, Left ICA 50%. . Eliquis held. Hypertensive on admission, BP>200. No neurosurgical intervention. Seen by neurology. BP control. Resume ASA/Eliquis 10-14 days per Neuro plan. Stable for acute inpt rehab. (18 Oct 2018 10:46)      PAST MEDICAL & SURGICAL HISTORY:  Lung cancer  Atrial fibrillation  Endometriosis  Hypertension  Fibroid tumor  History of ovarian cyst       REVIEW OF SYMPTOMS  [X] Constitutional WNL     [X] Cardio WNL            [X] Resp WNL           [X] GI WNL                          [X]  WNL                   [X] Heme WNL              [X] Endo WNL                     [X] Skin WNL                 [X] MSK WNL                   [X] Cognitive WNL        [X] Psych WNL      VITALS  Vital Signs Last 24 Hrs  T(C): 36.8 (26 Oct 2018 08:35), Max: 37.1 (25 Oct 2018 21:05)  T(F): 98.2 (26 Oct 2018 08:35), Max: 98.8 (25 Oct 2018 21:05)  HR: 58 (26 Oct 2018 08:35) (58 - 68)  BP: 132/79 (26 Oct 2018 08:35) (132/79 - 149/72)  BP(mean): --  RR: 14 (26 Oct 2018 08:35) (14 - 14)  SpO2: 96% (26 Oct 2018 08:35) (95% - 97%)      PHYSICAL EXAM  Constitutional - NAD, Comfortable  HEENT - NCAT, EOMI  Neck - Supple, No limited ROM  Chest - CTA bilaterally, No wheeze, No rhonchi, No crackles  Cardiovascular - RRR, S1S2, No murmurs  Abdomen - BS+, Soft, NTND  Extremities - No C/C/E, No calf tenderness   Skin-no rash  Wounds-na      PHYSICAL EXAM  Constitutional - NAD  HEENT - NCAT, EOMI  Neck - Supple, No limited ROM  Chest - CTA bilaterally, No wheeze, No rhonchi, No crackles  Cardiovascular - RRR, S1S2, No murmurs  Abdomen - BS+, Soft, NTND  Extremities - No C/C/E, No calf tenderness   Skin-no rash  Wounds-na      Neurologic Exam -  no new deficits.                         FUNCTIONAL PROGRESS  Gait - min A 75ft  ADLs - min/CG A  Transfers - min/mod A  Functional transfer - min A    RECENT LABS                        12.4   12.2  )-----------( 394      ( 26 Oct 2018 05:20 )             36.9     10-26    139  |  106  |  17  ----------------------------<  85  4.4   |  25  |  1.00    Ca    8.8      26 Oct 2018 05:20    TPro  6.0  /  Alb  2.6<L>  /  TBili  0.3  /  DBili  x   /  AST  14  /  ALT  14  /  AlkPhos  103  10-26      LIVER FUNCTIONS - ( 26 Oct 2018 05:20 )  Alb: 2.6 g/dL / Pro: 6.0 g/dL / ALK PHOS: 103 U/L / ALT: 14 U/L DA / AST: 14 U/L / GGT: x                           RADIOLOGY/OTHER RESULTS      CURRENT MEDICATIONS  MEDICATIONS  (STANDING):  diltiazem    milliGRAM(s) Oral <User Schedule>  escitalopram 10 milliGRAM(s) Oral daily  hydrALAZINE 25 milliGRAM(s) Oral every 8 hours  lisinopril 20 milliGRAM(s) Oral daily  metoprolol tartrate 25 milliGRAM(s) Oral two times a day    MEDICATIONS  (PRN):  ALPRAZolam 0.125 milliGRAM(s) Oral every 6 hours PRN anxiety      ASSESSMENT & PLAN      GI/Bowel Management -d/c all laxatives   Management - Toilet Q2  Skin - Turn Q2  Pain - Tylenol PRN  DVT PPX - TEDs, SCDs  Diet -reg c thins       Continue comprehensive acute rehab program consisting of 3hrs/day of OT/PT and SLP.

## 2018-10-27 PROCEDURE — 99232 SBSQ HOSP IP/OBS MODERATE 35: CPT

## 2018-10-27 RX ADMIN — Medication 25 MILLIGRAM(S): at 17:38

## 2018-10-27 RX ADMIN — Medication 25 MILLIGRAM(S): at 05:38

## 2018-10-27 RX ADMIN — Medication 25 MILLIGRAM(S): at 13:41

## 2018-10-27 RX ADMIN — ESCITALOPRAM OXALATE 10 MILLIGRAM(S): 10 TABLET, FILM COATED ORAL at 11:34

## 2018-10-27 RX ADMIN — Medication 240 MILLIGRAM(S): at 21:15

## 2018-10-27 RX ADMIN — LISINOPRIL 20 MILLIGRAM(S): 2.5 TABLET ORAL at 05:38

## 2018-10-27 RX ADMIN — Medication 25 MILLIGRAM(S): at 21:15

## 2018-10-27 NOTE — PROGRESS NOTE ADULT - SUBJECTIVE AND OBJECTIVE BOX
Chief complaint: no new complaints    Patient is a 68y old  Female who presents with a chief complaint of s/p right pontine ICH with left sided weakness and functional deficits. (26 Oct 2018 13:18)      HISTORY OF PRESENT ILLNESS  HEALTH ISSUES - PROBLEM Dx:  Leukocytosis: Leukocytosis  Diarrhea: Diarrhea  Labile mood: Labile mood  Lung cancer: Lung cancer  Hypertension: Hypertension  Atrial fibrillation: Atrial fibrillation  CVA (cerebral vascular accident): CVA (cerebral vascular accident)            PMHx -   PAST MEDICAL & SURGICAL HISTORY:  Lung cancer  Atrial fibrillation  Endometriosis  Hypertension  Fibroid tumor  History of ovarian cyst           VITALS  Vital Signs Last 24 Hrs  T(C): 36.9 (27 Oct 2018 07:30), Max: 37.1 (26 Oct 2018 21:15)  T(F): 98.5 (27 Oct 2018 07:30), Max: 98.7 (26 Oct 2018 21:15)  HR: 62 (27 Oct 2018 07:30) (62 - 67)  BP: 130/79 (27 Oct 2018 07:30) (119/71 - 140/80)  BP(mean): --  RR: 12 (27 Oct 2018 07:30) (12 - 15)  SpO2: 95% (27 Oct 2018 05:15) (95% - 96%)      PHYSICAL EXAM  Constitutional - NAD, Comfortable  HEENT - NCAT, EOMI  Neck - Supple, No limited ROM  Chest - CTA bilaterally  Cardiovascular - RRR, S1S2, No murmurs  Abdomen - BS+, Soft, NTND  Extremities - No C/C/E, No calf tenderness   Neurologic Exam -                    Cognitive - Awake, Alert, AAO X3     No new focal deficits                    Skin -   Wounds -        RECENT LABS                        12.4   12.2  )-----------( 394      ( 26 Oct 2018 05:20 )             36.9     10-26    139  |  106  |  17  ----------------------------<  85  4.4   |  25  |  1.00    Ca    8.8      26 Oct 2018 05:20    TPro  6.0  /  Alb  2.6<L>  /  TBili  0.3  /  DBili  x   /  AST  14  /  ALT  14  /  AlkPhos  103  10-26            RADIOLOGY/OTHER RESULTS      CURRENT MEDICATIONS    MEDICATIONS  (STANDING):  diltiazem    milliGRAM(s) Oral <User Schedule>  escitalopram 10 milliGRAM(s) Oral daily  hydrALAZINE 25 milliGRAM(s) Oral every 8 hours  lisinopril 20 milliGRAM(s) Oral daily  metoprolol tartrate 25 milliGRAM(s) Oral two times a day    MEDICATIONS  (PRN):  ALPRAZolam 0.125 milliGRAM(s) Oral every 6 hours PRN anxiety    ASSESSMENT & PLAN          GI/Bowel Management - Dulcolax PRN, Fleet PRN   Management - Toilet Q2  Skin - Turn Q2  Pain - Tylenol PRN  DVT PPX -       Continue comprehensive acute rehab program consisting of 3hrs/day of OT/PT and SLP.

## 2018-10-28 PROCEDURE — 99232 SBSQ HOSP IP/OBS MODERATE 35: CPT

## 2018-10-28 RX ADMIN — LISINOPRIL 20 MILLIGRAM(S): 2.5 TABLET ORAL at 05:54

## 2018-10-28 RX ADMIN — Medication 25 MILLIGRAM(S): at 21:48

## 2018-10-28 RX ADMIN — Medication 25 MILLIGRAM(S): at 13:12

## 2018-10-28 RX ADMIN — Medication 25 MILLIGRAM(S): at 05:54

## 2018-10-28 RX ADMIN — Medication 0.12 MILLIGRAM(S): at 15:50

## 2018-10-28 RX ADMIN — ESCITALOPRAM OXALATE 10 MILLIGRAM(S): 10 TABLET, FILM COATED ORAL at 11:29

## 2018-10-28 RX ADMIN — Medication 240 MILLIGRAM(S): at 21:48

## 2018-10-28 RX ADMIN — Medication 25 MILLIGRAM(S): at 17:45

## 2018-10-28 NOTE — CONSULT NOTE ADULT - SUBJECTIVE AND OBJECTIVE BOX
Psychiatry Consultation-Liaison Follow-up Note:     Identifying Data:   68 year old Single Female who presents with anxiety post CVA.     Case Discussed with: Nursing staff.    Interval History:  Patient initially seen by C/L service on October 18th for anxiety in the context of CVA. Pt states she was making progress, but this afternoon her room mate had a  rapid response, which precipitated panic symptoms, writer was present and pt removed from the room. She is in good control after use of prn and taking her out of the  room. Pt had fears post CVA that she would decompensate further. Remains anxious over potentially losing her job. Has psychosocial support of her younger sister.   Last night slept poorly, but previously had been sleeping well. Rest of psychiatric ROS is negative. Lexapro started for brain recovery tolerating well. No other mood symptoms noted   or elicited on exam.     MEDICATIONS  (STANDING):  diltiazem    milliGRAM(s) Oral <User Schedule>  escitalopram 10 milliGRAM(s) Oral daily  hydrALAZINE 25 milliGRAM(s) Oral every 8 hours  lisinopril 20 milliGRAM(s) Oral daily  metoprolol tartrate 25 milliGRAM(s) Oral two times a day    MEDICATIONS  (PRN):  ALPRAZolam 0.125 milliGRAM(s) Oral every 6 hours PRN anxiety      Mental Status Examination:  General Appearance: Blonde female looks her stated age, good eye contact.   Remarkable Features: None  Psychomotor State: mildly restless.   Abnormal movements and posture: Has fatigue with left foot drag and ataxia.   Social interaction and affect: Speech slight slur, normal tone and volume, socially appropriate, affect anxious.   Cognition, perceptual abnormalities: none   Consciousness: Alert   Orientation: to person and place.   Memory: Recent/Past/Remote: Grossly intact.   Attention: some deficits remain.   Naming/Repetition/Comprehension: Some deficits in executive functioning noted.   Insight/Judgment: Good     Studies:    Laboratory testing- slightly decreased albumin , White count 12.2      Vital Signs Last 24 Hrs  T(C): 37.1 (27 Oct 2018 21:23), Max: 37.1 (27 Oct 2018 21:23)  T(F): 98.8 (27 Oct 2018 21:23), Max: 98.8 (27 Oct 2018 21:23)  HR: 65 (28 Oct 2018 13:13) (61 - 68)  BP: 138/65 (28 Oct 2018 13:13) (132/81 - 166/86)  BP(mean): --  RR: 14 (28 Oct 2018 05:40) (14 - 14)  SpO2: 94% (28 Oct 2018 05:40) (94% - 95%)    Psychiatric Diagnosis: Anxiety due to General Medical condition  Left pontine CVA.     Recommendations:  Continue Lexapro 10 mg and Xanax prn 0.125 mg, may increase to 0.25 mg if ineffective for panic symptoms.     Hospital course Follow-up  Psychiatry will follow as needed.

## 2018-10-28 NOTE — PROGRESS NOTE ADULT - SUBJECTIVE AND OBJECTIVE BOX
Chief complaint: no new complaints    Patient is a 68y old  Female who presents with a chief complaint of s/p right pontine ICH with left sided weakness and functional deficits. (27 Oct 2018 11:36)        HEALTH ISSUES - PROBLEM Dx:  Leukocytosis: Leukocytosis  Diarrhea: Diarrhea  Labile mood: Labile mood  Lung cancer: Lung cancer  Hypertension: Hypertension  Atrial fibrillation: Atrial fibrillation  CVA (cerebral vascular accident): CVA (cerebral vascular accident)            PMHx -   PAST MEDICAL & SURGICAL HISTORY:  Lung cancer  Atrial fibrillation  Endometriosis  Hypertension  Fibroid tumor  History of ovarian cyst         VITALS  Vital Signs Last 24 Hrs  T(C): 37.1 (27 Oct 2018 21:23), Max: 37.1 (27 Oct 2018 21:23)  T(F): 98.8 (27 Oct 2018 21:23), Max: 98.8 (27 Oct 2018 21:23)  HR: 61 (28 Oct 2018 05:40) (61 - 68)  BP: 144/78 (28 Oct 2018 05:40) (127/66 - 166/86)  BP(mean): --  RR: 14 (28 Oct 2018 05:40) (14 - 14)  SpO2: 94% (28 Oct 2018 05:40) (94% - 98%)      PHYSICAL EXAM  Constitutional - NAD, Comfortable  HEENT - NCAT, EOMI  Neck - Supple, No limited ROM  Chest - CTA bilaterally  Cardiovascular - RRR, S1S2, No murmurs  Abdomen - BS+, Soft, NTND  Extremities - No C/C/E, No calf tenderness   Neurologic Exam -                    Cognitive - Awake, Alert, AAO X3     No new focal deficits                  CURRENT MEDICATIONS    MEDICATIONS  (STANDING):  diltiazem    milliGRAM(s) Oral <User Schedule>  escitalopram 10 milliGRAM(s) Oral daily  hydrALAZINE 25 milliGRAM(s) Oral every 8 hours  lisinopril 20 milliGRAM(s) Oral daily  metoprolol tartrate 25 milliGRAM(s) Oral two times a day    MEDICATIONS  (PRN):  ALPRAZolam 0.125 milliGRAM(s) Oral every 6 hours PRN anxiety    ASSESSMENT & PLAN          GI/Bowel Management - Dulcolax PRN, Fleet PRN   Management - Toilet Q2  Skin - Turn Q2  Pain - Tylenol PRN        Continue comprehensive acute rehab program consisting of 3hrs/day of OT/PT and SLP.

## 2018-10-28 NOTE — CONSULT NOTE ADULT - REASON FOR ADMISSION
s/p right pontine ICH with left sided weakness and functional deficits.

## 2018-10-29 LAB
ALBUMIN SERPL ELPH-MCNC: 2.6 G/DL — LOW (ref 3.3–5)
ALP SERPL-CCNC: 105 U/L — SIGNIFICANT CHANGE UP (ref 40–120)
ALT FLD-CCNC: 17 U/L DA — SIGNIFICANT CHANGE UP (ref 10–45)
ANION GAP SERPL CALC-SCNC: 8 MMOL/L — SIGNIFICANT CHANGE UP (ref 5–17)
AST SERPL-CCNC: 10 U/L — SIGNIFICANT CHANGE UP (ref 10–40)
BILIRUB SERPL-MCNC: 0.2 MG/DL — SIGNIFICANT CHANGE UP (ref 0.2–1.2)
BUN SERPL-MCNC: 25 MG/DL — HIGH (ref 7–23)
CALCIUM SERPL-MCNC: 8.8 MG/DL — SIGNIFICANT CHANGE UP (ref 8.4–10.5)
CHLORIDE SERPL-SCNC: 104 MMOL/L — SIGNIFICANT CHANGE UP (ref 96–108)
CO2 SERPL-SCNC: 25 MMOL/L — SIGNIFICANT CHANGE UP (ref 22–31)
CREAT SERPL-MCNC: 1.09 MG/DL — SIGNIFICANT CHANGE UP (ref 0.5–1.3)
GLUCOSE SERPL-MCNC: 106 MG/DL — HIGH (ref 70–99)
HCT VFR BLD CALC: 37.3 % — SIGNIFICANT CHANGE UP (ref 34.5–45)
HGB BLD-MCNC: 12.3 G/DL — SIGNIFICANT CHANGE UP (ref 11.5–15.5)
MCHC RBC-ENTMCNC: 29.4 PG — SIGNIFICANT CHANGE UP (ref 27–34)
MCHC RBC-ENTMCNC: 33 GM/DL — SIGNIFICANT CHANGE UP (ref 32–36)
MCV RBC AUTO: 89.1 FL — SIGNIFICANT CHANGE UP (ref 80–100)
PLATELET # BLD AUTO: 380 K/UL — SIGNIFICANT CHANGE UP (ref 150–400)
POTASSIUM SERPL-MCNC: 4.5 MMOL/L — SIGNIFICANT CHANGE UP (ref 3.5–5.3)
POTASSIUM SERPL-SCNC: 4.5 MMOL/L — SIGNIFICANT CHANGE UP (ref 3.5–5.3)
PROT SERPL-MCNC: 6.2 G/DL — SIGNIFICANT CHANGE UP (ref 6–8.3)
RBC # BLD: 4.19 M/UL — SIGNIFICANT CHANGE UP (ref 3.8–5.2)
RBC # FLD: 12.6 % — SIGNIFICANT CHANGE UP (ref 10.3–14.5)
SODIUM SERPL-SCNC: 137 MMOL/L — SIGNIFICANT CHANGE UP (ref 135–145)
WBC # BLD: 13.3 K/UL — HIGH (ref 3.8–10.5)
WBC # FLD AUTO: 13.3 K/UL — HIGH (ref 3.8–10.5)

## 2018-10-29 PROCEDURE — 99232 SBSQ HOSP IP/OBS MODERATE 35: CPT

## 2018-10-29 PROCEDURE — 90832 PSYTX W PT 30 MINUTES: CPT

## 2018-10-29 RX ORDER — HYDRALAZINE HCL 50 MG
50 TABLET ORAL EVERY 8 HOURS
Qty: 0 | Refills: 0 | Status: DISCONTINUED | OUTPATIENT
Start: 2018-10-29 | End: 2018-11-02

## 2018-10-29 RX ORDER — ALPRAZOLAM 0.25 MG
0.12 TABLET ORAL ONCE
Qty: 0 | Refills: 0 | Status: DISCONTINUED | OUTPATIENT
Start: 2018-10-29 | End: 2018-10-29

## 2018-10-29 RX ADMIN — Medication 25 MILLIGRAM(S): at 05:33

## 2018-10-29 RX ADMIN — Medication 240 MILLIGRAM(S): at 22:35

## 2018-10-29 RX ADMIN — Medication 25 MILLIGRAM(S): at 13:28

## 2018-10-29 RX ADMIN — Medication 50 MILLIGRAM(S): at 22:35

## 2018-10-29 RX ADMIN — ESCITALOPRAM OXALATE 10 MILLIGRAM(S): 10 TABLET, FILM COATED ORAL at 12:34

## 2018-10-29 RX ADMIN — LISINOPRIL 20 MILLIGRAM(S): 2.5 TABLET ORAL at 05:34

## 2018-10-29 RX ADMIN — Medication 25 MILLIGRAM(S): at 17:49

## 2018-10-29 RX ADMIN — Medication 0.12 MILLIGRAM(S): at 22:35

## 2018-10-29 RX ADMIN — Medication 0.12 MILLIGRAM(S): at 21:26

## 2018-10-29 NOTE — PROGRESS NOTE ADULT - PROBLEM SELECTOR PLAN 3
Better controlled, will follow medicine's recommendations. /92 in therapy, will follow medicine's recommendations.

## 2018-10-29 NOTE — PROGRESS NOTE ADULT - ATTENDING COMMENTS
Patient medically stable. Making progress towards rehab goals.   Continue rehab program.     Plan is to repeat Head CT at the end of this week to reevaluate for resumption of AC therapy/Eliquis.

## 2018-10-29 NOTE — PROGRESS NOTE ADULT - SUBJECTIVE AND OBJECTIVE BOX
CHIEF COMPLAINT: No new events, no chest pain or palpitations, no new weakness. NAD this am.       HISTORY OF PRESENT ILLNESS  69 yo female ex smoker w/hx of lung ca (5/18 VATs/lobectomy), HTN and Afib on Eliquis and ASA presents to [facility] with sudden onset left side weakness and dysarthria. CT head shows acute pontine ICH. CTA head neg stenosis R, Left ICA 50%. . Eliquis held. Hypertensive on admission, BP>200. No neurosurgical intervention. Seen by neurology. BP control. Resume ASA/Eliquis 10-14 days per Neuro plan. Stable for acute inpt rehab. (18 Oct 2018 10:46)      PAST MEDICAL & SURGICAL HISTORY:  Lung cancer  Atrial fibrillation  Endometriosis  Hypertension  Fibroid tumor  History of ovarian cyst       REVIEW OF SYMPTOMS  [X] Constitutional WNL     [X] Cardio WNL            [X] Resp WNL           [X] GI WNL                          [X]  WNL                   [X] Heme WNL              [X] Endo WNL                     [X] Skin WNL                 [X] MSK WNL                               [X] Cognitive WNL              VITALS  Vital Signs Last 24 Hrs  T(C): 36.7 (29 Oct 2018 07:32), Max: 36.7 (28 Oct 2018 20:55)  T(F): 98 (29 Oct 2018 07:32), Max: 98.1 (28 Oct 2018 20:55)  HR: 59 (29 Oct 2018 07:32) (59 - 65)  BP: 131/76 (29 Oct 2018 07:32) (117/71 - 170/80)  BP(mean): --  RR: 14 (29 Oct 2018 07:32) (13 - 14)  SpO2: 96% (29 Oct 2018 07:32) (95% - 96%)      PHYSICAL EXAM  Constitutional - NAD  HEENT - NCAT, EOMI  Neck - Supple, No limited ROM  Chest - CTA bilaterally, No wheeze, No rhonchi, No crackles  Cardiovascular - RRR, S1S2, No murmurs  Abdomen - BS+, Soft, NTND  Extremities - No C/C/E, No calf tenderness   Skin-no rash  Wounds-na      Neurologic Exam -  no new deficits.                         FUNCTIONAL PROGRESS  Gait - CG A  ADLs - CG A  Transfers - min/CG A  Functional transfer -CG/mod I A      RECENT LABS                        12.3   13.3  )-----------( 380      ( 29 Oct 2018 05:45 )             37.3     10-29    137  |  104  |  25<H>  ----------------------------<  106<H>  4.5   |  25  |  1.09    Ca    8.8      29 Oct 2018 05:45    TPro  6.2  /  Alb  2.6<L>  /  TBili  0.2  /  DBili  x   /  AST  10  /  ALT  17  /  AlkPhos  105  10-29      LIVER FUNCTIONS - ( 29 Oct 2018 05:45 )  Alb: 2.6 g/dL / Pro: 6.2 g/dL / ALK PHOS: 105 U/L / ALT: 17 U/L DA / AST: 10 U/L / GGT: x                           RADIOLOGY/OTHER RESULTS      CURRENT MEDICATIONS  MEDICATIONS  (STANDING):  diltiazem    milliGRAM(s) Oral <User Schedule>  escitalopram 10 milliGRAM(s) Oral daily  hydrALAZINE 25 milliGRAM(s) Oral every 8 hours  lisinopril 20 milliGRAM(s) Oral daily  metoprolol tartrate 25 milliGRAM(s) Oral two times a day    MEDICATIONS  (PRN):  ALPRAZolam 0.125 milliGRAM(s) Oral every 6 hours PRN anxiety      ASSESSMENT & PLAN      GI/Bowel Management -hold laxatives   Management - Toilet Q2  Skin - Turn Q2  Pain - Tylenol PRN  DVT PPX - TEDs, SCDs  Diet -reg c thins     Continue comprehensive acute rehab program consisting of 3hrs/day of OT/PT and SLP. CHIEF COMPLAINT: No new events, no chest pain or palpitations, no new weakness. NAD this am.       HISTORY OF PRESENT ILLNESS  69 yo female ex smoker w/hx of lung ca (5/18 VATs/lobectomy), HTN and Afib on Eliquis and ASA presents to [facility] with sudden onset left side weakness and dysarthria. CT head shows acute pontine ICH. CTA head neg stenosis R, Left ICA 50%. . Eliquis held. Hypertensive on admission, BP>200. No neurosurgical intervention. Seen by neurology. BP control. Resume ASA/Eliquis 10-14 days per Neuro plan. Stable for acute inpt rehab. (18 Oct 2018 10:46)      PAST MEDICAL & SURGICAL HISTORY:  Lung cancer  Atrial fibrillation  Endometriosis  Hypertension  Fibroid tumor  History of ovarian cyst       REVIEW OF SYMPTOMS  [X] Constitutional WNL     [X] Cardio WNL            [X] Resp WNL           [X] GI WNL                          [X]  WNL                   [X] Heme WNL              [X] Endo WNL                     [X] Skin WNL                 [X] MSK WNL                               [X] Cognitive WNL              VITALS  Vital Signs Last 24 Hrs  T(C): 36.7 (29 Oct 2018 07:32), Max: 36.7 (28 Oct 2018 20:55)  T(F): 98 (29 Oct 2018 07:32), Max: 98.1 (28 Oct 2018 20:55)  HR: 59 (29 Oct 2018 07:32) (59 - 65)  BP: 131/76 (29 Oct 2018 07:32) (117/71 - 170/80)  BP(mean): --  RR: 14 (29 Oct 2018 07:32) (13 - 14)  SpO2: 96% (29 Oct 2018 07:32) (95% - 96%)      PHYSICAL EXAMre  Constitutional - NAD  HEENT - NCAT, EOMI  Neck - Supple, No limited ROM  Chest - CTA bilaterally, No wheeze, No rhonchi, No crackles  Cardiovascular - RRR, S1S2, No murmurs  Abdomen - BS+, Soft, NTND  Extremities - No C/C/E, No calf tenderness   Skin-no rash  Wounds-na      Neurologic Exam -  no new deficits.                         FUNCTIONAL PROGRESS  Gait - CG A  ADLs - CG A  Transfers - min/CG A  Functional transfer -CG/mod I A      RECENT LABS                        12.3   13.3  )-----------( 380      ( 29 Oct 2018 05:45 )             37.3     10-29    137  |  104  |  25<H>  ----------------------------<  106<H>  4.5   |  25  |  1.09    Ca    8.8      29 Oct 2018 05:45    TPro  6.2  /  Alb  2.6<L>  /  TBili  0.2  /  DBili  x   /  AST  10  /  ALT  17  /  AlkPhos  105  10-29      LIVER FUNCTIONS - ( 29 Oct 2018 05:45 )  Alb: 2.6 g/dL / Pro: 6.2 g/dL / ALK PHOS: 105 U/L / ALT: 17 U/L DA / AST: 10 U/L / GGT: x                           RADIOLOGY/OTHER RESULTS      CURRENT MEDICATIONS  MEDICATIONS  (STANDING):  diltiazem    milliGRAM(s) Oral <User Schedule>  escitalopram 10 milliGRAM(s) Oral daily  hydrALAZINE 25 milliGRAM(s) Oral every 8 hours  lisinopril 20 milliGRAM(s) Oral daily  metoprolol tartrate 25 milliGRAM(s) Oral two times a day    MEDICATIONS  (PRN):  ALPRAZolam 0.125 milliGRAM(s) Oral every 6 hours PRN anxiety      ASSESSMENT & PLAN      GI/Bowel Management -hold laxatives   Management - Toilet Q2  Skin - Turn Q2  Pain - Tylenol PRN  DVT PPX - TEDs, SCDs  Diet -reg c thins     Continue comprehensive acute rehab program consisting of 3hrs/day of OT/PT and SLP.

## 2018-10-29 NOTE — PROGRESS NOTE ADULT - SUBJECTIVE AND OBJECTIVE BOX
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 (29 Oct 2018 08:57)    Patient seen and examined at bedside. Asked to see patient for HTN    ALLERGIES:  No Known Allergies    MEDICATIONS  (STANDING):  diltiazem    milliGRAM(s) Oral <User Schedule>  escitalopram 10 milliGRAM(s) Oral daily  hydrALAZINE 25 milliGRAM(s) Oral every 8 hours  lisinopril 20 milliGRAM(s) Oral daily  metoprolol tartrate 25 milliGRAM(s) Oral two times a day    MEDICATIONS  (PRN):  ALPRAZolam 0.125 milliGRAM(s) Oral every 6 hours PRN anxiety    Vital Signs Last 24 Hrs  T(F): 98 (29 Oct 2018 07:32), Max: 98.1 (28 Oct 2018 20:55)  HR: 59 (29 Oct 2018 07:32) (59 - 65)  BP: 131/76 (29 Oct 2018 07:32) (117/71 - 170/80)  RR: 14 (29 Oct 2018 07:32) (13 - 14)  SpO2: 96% (29 Oct 2018 07:32) (95% - 96%)  I&O's Summary    28 Oct 2018 07:01  -  29 Oct 2018 07:00  --------------------------------------------------------  IN: 840 mL / OUT: 0 mL / NET: 840 mL      PHYSICAL EXAM:  General: NAD, A/O x 3  ENT: MMM  Neck: Supple, No JVD  Lungs: Clear to auscultation bilaterally  Cardio: RRR, S1/S2, No murmurs  Abdomen: Soft, Nontender, Nondistended; Bowel sounds present  Extremities: No calf tenderness, No pitting edema    LABS:                        12.3   13.3  )-----------( 380      ( 29 Oct 2018 05:45 )             37.3     10-29    137  |  104  |  25  ----------------------------<  106  4.5   |  25  |  1.09    Ca    8.8      29 Oct 2018 05:45    TPro  6.2  /  Alb  2.6  /  TBili  0.2  /  DBili  x   /  AST  10  /  ALT  17  /  AlkPhos  105  10-29    eGFR if Non African American: 52 mL/min/1.73M2 (10-29-18 @ 05:45)  eGFR if African American: 60 mL/min/1.73M2 (10-29-18 @ 05:45)

## 2018-10-30 PROCEDURE — 99232 SBSQ HOSP IP/OBS MODERATE 35: CPT

## 2018-10-30 PROCEDURE — 99233 SBSQ HOSP IP/OBS HIGH 50: CPT

## 2018-10-30 PROCEDURE — 90832 PSYTX W PT 30 MINUTES: CPT

## 2018-10-30 RX ORDER — ALPRAZOLAM 0.25 MG
0.25 TABLET ORAL AT BEDTIME
Qty: 0 | Refills: 0 | Status: DISCONTINUED | OUTPATIENT
Start: 2018-10-30 | End: 2018-11-02

## 2018-10-30 RX ADMIN — Medication 50 MILLIGRAM(S): at 05:42

## 2018-10-30 RX ADMIN — Medication 50 MILLIGRAM(S): at 21:13

## 2018-10-30 RX ADMIN — Medication 0.25 MILLIGRAM(S): at 21:13

## 2018-10-30 RX ADMIN — Medication 25 MILLIGRAM(S): at 18:02

## 2018-10-30 RX ADMIN — ESCITALOPRAM OXALATE 10 MILLIGRAM(S): 10 TABLET, FILM COATED ORAL at 12:14

## 2018-10-30 RX ADMIN — Medication 25 MILLIGRAM(S): at 05:43

## 2018-10-30 RX ADMIN — Medication 50 MILLIGRAM(S): at 13:05

## 2018-10-30 RX ADMIN — LISINOPRIL 20 MILLIGRAM(S): 2.5 TABLET ORAL at 05:43

## 2018-10-30 RX ADMIN — Medication 240 MILLIGRAM(S): at 21:13

## 2018-10-30 RX ADMIN — Medication 0.12 MILLIGRAM(S): at 05:43

## 2018-10-30 NOTE — PROGRESS NOTE ADULT - SUBJECTIVE AND OBJECTIVE BOX
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 (29 Oct 2018 14:58)    Patient seen and examined at bedside.    ALLERGIES:  No Known Allergies    MEDICATIONS  (STANDING):  diltiazem    milliGRAM(s) Oral <User Schedule>  escitalopram 10 milliGRAM(s) Oral daily  hydrALAZINE 50 milliGRAM(s) Oral every 8 hours  lisinopril 20 milliGRAM(s) Oral daily  metoprolol tartrate 25 milliGRAM(s) Oral two times a day    MEDICATIONS  (PRN):  ALPRAZolam 0.125 milliGRAM(s) Oral every 6 hours PRN anxiety    Vital Signs Last 24 Hrs  T(F): 98.1 (30 Oct 2018 07:52), Max: 98.2 (29 Oct 2018 22:10)  HR: 65 (30 Oct 2018 07:52) (65 - 77)  BP: 148/75 (30 Oct 2018 07:52) (148/75 - 160/80)  RR: 14 (30 Oct 2018 07:52) (14 - 14)  SpO2: 96% (30 Oct 2018 07:52) (96% - 96%)  I&O's Summary    PHYSICAL EXAM:  General: NAD, A/O x 3  ENT: MMM  Neck: Supple, No JVD  Lungs: Clear to auscultation bilaterally  Cardio: RRR, S1/S2, No murmurs  Abdomen: Soft, Nontender, Nondistended; Bowel sounds present  Extremities: No calf tenderness, No pitting edema    LABS:                        12.3   13.3  )-----------( 380      ( 29 Oct 2018 05:45 )             37.3     10-29    137  |  104  |  25  ----------------------------<  106  4.5   |  25  |  1.09    Ca    8.8      29 Oct 2018 05:45    TPro  6.2  /  Alb  2.6  /  TBili  0.2  /  DBili  x   /  AST  10  /  ALT  17  /  AlkPhos  105  10-29    eGFR if Non African American: 52 mL/min/1.73M2 (10-29-18 @ 05:45)  eGFR if African American: 60 mL/min/1.73M2 (10-29-18 @ 05:45)    CAPILLARY BLOOD GLUCOSE      Care Discussed with Consultants/Other Providers: Dr. Lovell Patient is a 68y old  Female who presents with a chief complaint of s/p right pontine ICH with left sided weakness and functional deficits. (29 Oct 2018 14:58)    Patient seen and examined at bedside.    ALLERGIES:  No Known Allergies    MEDICATIONS  (STANDING):  diltiazem    milliGRAM(s) Oral <User Schedule>  escitalopram 10 milliGRAM(s) Oral daily  hydrALAZINE 50 milliGRAM(s) Oral every 8 hours  lisinopril 20 milliGRAM(s) Oral daily  metoprolol tartrate 25 milliGRAM(s) Oral two times a day    MEDICATIONS  (PRN):  ALPRAZolam 0.125 milliGRAM(s) Oral every 6 hours PRN anxiety    Vital Signs Last 24 Hrs  T(F): 98.1 (30 Oct 2018 07:52), Max: 98.2 (29 Oct 2018 22:10)  HR: 65 (30 Oct 2018 07:52) (65 - 77)  BP: 148/75 (30 Oct 2018 07:52) (148/75 - 160/80)  RR: 14 (30 Oct 2018 07:52) (14 - 14)  SpO2: 96% (30 Oct 2018 07:52) (96% - 96%)  I&O's Summary    PHYSICAL EXAM:  General: Depressed mood (tearful)  ENT: MMM  Neck: Supple, No JVD  Lungs: Clear to auscultation bilaterally  Cardio: RRR, S1/S2, No murmurs  Abdomen: Soft, Nontender, Nondistended; Bowel sounds present  Extremities: No calf tenderness, No pitting edema    LABS:                        12.3   13.3  )-----------( 380      ( 29 Oct 2018 05:45 )             37.3     10-29    137  |  104  |  25  ----------------------------<  106  4.5   |  25  |  1.09    Ca    8.8      29 Oct 2018 05:45    TPro  6.2  /  Alb  2.6  /  TBili  0.2  /  DBili  x   /  AST  10  /  ALT  17  /  AlkPhos  105  10-29    eGFR if Non African American: 52 mL/min/1.73M2 (10-29-18 @ 05:45)  eGFR if African American: 60 mL/min/1.73M2 (10-29-18 @ 05:45)    CAPILLARY BLOOD GLUCOSE      Care Discussed with Consultants/Other Providers: Dr. Lovell

## 2018-10-30 NOTE — PROGRESS NOTE ADULT - SUBJECTIVE AND OBJECTIVE BOX
Psychiatry Consultation-Liaison Follow-up Note  68y  Encounter: 10/30/2018  Chart reviewed,  	  Met with-patient  Case Discussed with-Attending Dr EUNICE Lovell, Nursing, Physical therapist.    Interval History:    Symptom progression-    Medication Changes-  ALPRAZolam 0.125 milliGRAM(s) Oral every 6 hours PRN  diltiazem    milliGRAM(s) Oral <User Schedule>  escitalopram 10 milliGRAM(s) Oral daily  hydrALAZINE 50 milliGRAM(s) Oral every 8 hours  lisinopril 20 milliGRAM(s) Oral daily  metoprolol tartrate 25 milliGRAM(s) Oral two times a day      Mental Status Examination:  Change/ relativity from prior examination, if applicable  General  Appearance-  -hygiene/grooming-  Behavior-  -PMA/R-  -AIM-  Attitude/ Cooperativity-  -eye contact-  Speech-  -rate  -rhythm  -volume  -prosody  Thought Process-  Thought Content-  -themes/preoccupations-  -perceptual disturbance-  -delusions  -bizarre/referential/paranoid ideation-  -self-harm or aggressive thoughts-  Mood-  Affect-  -quality  -stability-  -range-  -intensity-  -congruence/appropriateness-  Cognition-  -sensorium-  -orientation-  -attention-  -recall-  Impulse Control-  -state-  -trait-  Insight/Judgment-    Studies:    Laboratory testing-                        12.3   13.3  )-----------( 380      ( 29 Oct 2018 05:45 )             37.3     10-29    137  |  104  |  25<H>  ----------------------------<  106<H>  4.5   |  25  |  1.09    Ca    8.8      29 Oct 2018 05:45    TPro  6.2  /  Alb  2.6<L>  /  TBili  0.2  /  DBili  x   /  AST  10  /  ALT  17  /  AlkPhos  105  10-29    LIVER FUNCTIONS - ( 29 Oct 2018 05:45 )  Alb: 2.6 g/dL / Pro: 6.2 g/dL / ALK PHOS: 105 U/L / ALT: 17 U/L DA / AST: 10 U/L / GGT: x             Assessment:    Recommendations:      Medication-        Hospital course Follow-up Psychiatry Consultation-Liaison Follow-up Note  68y  Encounter: 10/30/2018  Chart reviewed,  	  Met with-patient  Case Discussed with-Attending Dr EUNICE Lovell, Nursing, Physical therapist.    Interval History:    Symptom progression-    Medication Changes-  ALPRAZolam 0.125 milliGRAM(s) Oral every 6 hours PRN  diltiazem    milliGRAM(s) Oral <User Schedule>  escitalopram 10 milliGRAM(s) Oral daily  hydrALAZINE 50 milliGRAM(s) Oral every 8 hours  lisinopril 20 milliGRAM(s) Oral daily  metoprolol tartrate 25 milliGRAM(s) Oral two times a day      Mental Status Examination:  Change/ relativity from prior examination, if applicable  General  Appearance-  -hygiene/grooming-  Behavior-  -PMA/R-  -AIM-  Attitude/ Cooperativity-  -eye contact-  Speech-  -rate  -rhythm  -volume  -prosody  Thought Process-  Thought Content-  -themes/preoccupations-  -perceptual disturbance-  -delusions  -bizarre/referential/paranoid ideation-  -self-harm or aggressive thoughts-  Mood-  Affect-  -quality  -stability-  -range-  -intensity-  -congruence/appropriateness-  Cognition-  -sensorium-  -orientation-  -attention-  -recall-  Impulse Control-  -state-  -trait-  Insight/Judgment-    Studies:    Laboratory testing-                        12.3   13.3  )-----------( 380      ( 29 Oct 2018 05:45 )             37.3     10-29    137  |  104  |  25<H>  ----------------------------<  106<H>  4.5   |  25  |  1.09    Ca    8.8      29 Oct 2018 05:45    TPro  6.2  /  Alb  2.6<L>  /  TBili  0.2  /  DBili  x   /  AST  10  /  ALT  17  /  AlkPhos  105  10-29    LIVER FUNCTIONS - ( 29 Oct 2018 05:45 )  Alb: 2.6 g/dL / Pro: 6.2 g/dL / ALK PHOS: 105 U/L / ALT: 17 U/L DA / AST: 10 U/L / GGT: x           Vital Signs Last 24 Hrs  T(F): 98.1 (30 Oct 2018 07:52), Max: 98.2 (29 Oct 2018 22:10)  HR: 65 (30 Oct 2018 07:52) (65 - 77)  BP: 148/75 (30 Oct 2018 07:52) (148/75 - 160/80)  RR: 14 (30 Oct 2018 07:52) (14 - 14)  SpO2: 96% (30 Oct 2018 07:52) (96% - 96%)    Assessment:    Recommendations:      Medication-        Hospital course Follow-up Psychiatry Consultation-Liaison Follow-up Note  68y  Encounter: 10/30/2018  Chart reviewed,  	  Met with-patient  Case Discussed with-Attending Dr EUNICE Lovell, Nursing, Physical therapist.    Identifying Data:   68 year old Single Female who presents with anxiety post CVA.     Interval History:  Patient initially seen by C/L service on October 18th for anxiety in the context of CVA. Pt states she is angry of staff not informing her of former patient having her  stay over in room.  Reports she is also irritated that she had not received her medication for sleep as had been ordered for her every night, which fell or medication record.  Reports to have slept  poorly last night and had previously been sleeping well. Was initially refusing to speak with writer, but agreed with encouragement, becoming tearful during interview.  Reports "It's hard being in the hospital."  Lexapro started for brain recovery, was initiated at 5mg po daily, with increased to 10 mg daily.  Will continue to monitor, may be too stimulating to patient but will evaluate over the next few days. Patient in agreement with plan of care.  No other mood symptoms noted or elicited on exam.     MEDICATIONS  (STANDING):  diltiazem    milliGRAM(s) Oral <User Schedule>  escitalopram 10 milliGRAM(s) Oral daily  hydrALAZINE 25 milliGRAM(s) Oral every 8 hours  lisinopril 20 milliGRAM(s) Oral daily  metoprolol tartrate 25 milliGRAM(s) Oral two times a day    MEDICATIONS  (PRN):  ALPRAZolam 0.125 milliGRAM(s) Oral every 6 hours PRN anxiety    Mental Status Examination:  General Appearance: Blonde female looks her stated age, good eye contact.   Remarkable Features: None  Psychomotor State: mildly restless.   Abnormal movements and posture: Has fatigue with left foot drag and ataxia.   Social interaction and affect: Speech slight slur, normal tone and volume, socially appropriate, affect irritated.    Cognition, perceptual abnormalities: none   Consciousness: Alert   Orientation: to person and place.   Memory: Recent/Past/Remote: Grossly intact.   Attention: some deficits remain.   Naming/Repetition/Comprehension: Some deficits in executive functioning noted.   Insight/Judgment: Good     Laboratory testing-                        12.3   13.3  )-----------( 380      ( 29 Oct 2018 05:45 )             37.3     10-29    137  |  104  |  25<H>  ----------------------------<  106<H>  4.5   |  25  |  1.09    Ca    8.8      29 Oct 2018 05:45    TPro  6.2  /  Alb  2.6<L>  /  TBili  0.2  /  DBili  x   /  AST  10  /  ALT  17  /  AlkPhos  105  10-29    LIVER FUNCTIONS - ( 29 Oct 2018 05:45 )  Alb: 2.6 g/dL / Pro: 6.2 g/dL / ALK PHOS: 105 U/L / ALT: 17 U/L DA / AST: 10 U/L / GGT: x           Vital Signs Last 24 Hrs  T(F): 98.1 (30 Oct 2018 07:52), Max: 98.2 (29 Oct 2018 22:10)  HR: 65 (30 Oct 2018 07:52) (65 - 77)  BP: 148/75 (30 Oct 2018 07:52) (148/75 - 160/80)  RR: 14 (30 Oct 2018 07:52) (14 - 14)  SpO2: 96% (30 Oct 2018 07:52) (96% - 96%)    Psychiatric Diagnosis: Anxiety due to General Medical condition  Left pontine CVA.      Recommendations: Continue Lexapro 10 mg po daily at this time.  Xanax 0.25 mg po at HS.  Xanax 0.125 mg po Q H 6 prn anxiousness.    Will continue to follow with you. Psychiatry Consultation-Liaison Follow-up Note  68y  Encounter: 10/30/2018  Chart reviewed,  	  Met with-patient  Case Discussed with-Attending Dr EUNICE Lovell, Nursing, Physical therapist.    Identifying Data:   68 year old Single Female who presents with anxiety post CVA.     Interval History:  Patient initially seen by C/L service on October 18th for anxiety in the context of CVA. Pt states she is angry at staff for not informing her of their allowing her former roommate's  to stay overnight with his wife in the room.  Reports she is also irritated that she had not received her medication for sleep, as it had been ordered for her every night, but fell off medication record and she had not received it.  Reports to have slept  poorly last night and had previously been sleeping well. Was initially refusing to speak with writer, but agreed with encouragement, becoming tearful during interview.  Reports "It's hard being in the hospital."  Lexapro started for brain recovery, was initiated at 5mg po daily, with increased to 10 mg daily.  Will continue to monitor, may be too stimulating to patient but will evaluate over the next few days. Patient in agreement with plan of care.  No other mood symptoms noted or elicited on exam.     MEDICATIONS  (STANDING):  diltiazem    milliGRAM(s) Oral <User Schedule>  escitalopram 10 milliGRAM(s) Oral daily  hydrALAZINE 25 milliGRAM(s) Oral every 8 hours  lisinopril 20 milliGRAM(s) Oral daily  metoprolol tartrate 25 milliGRAM(s) Oral two times a day    MEDICATIONS  (PRN):  ALPRAZolam 0.125 milliGRAM(s) Oral every 6 hours PRN anxiety    Mental Status Examination:  General Appearance: Blonde female looks her stated age, good eye contact.   Remarkable Features: None  Psychomotor State: mildly restless.   Abnormal movements and posture: Has fatigue with left foot drag and ataxia.   Social interaction and affect: Speech slight slur, normal tone and volume, socially appropriate, affect irritated.    Cognition, perceptual abnormalities: none   Consciousness: Alert   Orientation: to person and place.   Memory: Recent/Past/Remote: Grossly intact.   Attention: some deficits remain.   Naming/Repetition/Comprehension: Some deficits in executive functioning noted.   Insight/Judgment: Good     Laboratory testing-                        12.3   13.3  )-----------( 380      ( 29 Oct 2018 05:45 )             37.3     10-29    137  |  104  |  25<H>  ----------------------------<  106<H>  4.5   |  25  |  1.09    Ca    8.8      29 Oct 2018 05:45    TPro  6.2  /  Alb  2.6<L>  /  TBili  0.2  /  DBili  x   /  AST  10  /  ALT  17  /  AlkPhos  105  10-29    LIVER FUNCTIONS - ( 29 Oct 2018 05:45 )  Alb: 2.6 g/dL / Pro: 6.2 g/dL / ALK PHOS: 105 U/L / ALT: 17 U/L DA / AST: 10 U/L / GGT: x           Vital Signs Last 24 Hrs  T(F): 98.1 (30 Oct 2018 07:52), Max: 98.2 (29 Oct 2018 22:10)  HR: 65 (30 Oct 2018 07:52) (65 - 77)  BP: 148/75 (30 Oct 2018 07:52) (148/75 - 160/80)  RR: 14 (30 Oct 2018 07:52) (14 - 14)  SpO2: 96% (30 Oct 2018 07:52) (96% - 96%)    Psychiatric Diagnosis: Anxiety due to General Medical condition  Left pontine CVA.      Recommendations: Continue Lexapro 10 mg po daily at this time.  Xanax 0.25 mg po at HS.  Xanax 0.125 mg po Q H 6 prn anxiousness.    Will continue to follow with you.

## 2018-10-30 NOTE — PROGRESS NOTE ADULT - ATTENDING COMMENTS
Appears anxious and irritable today. As per staff, was upset last night about her roommate's  sleeping over. I requested psychiatry follow up today , case discussed, Will follow recommendations. Case also discussed with Medicine and SW. Discharge plan is in progress. Appears anxious and irritable today. As per staff, was upset last night about her roommate's  sleeping over. I requested psychiatry follow up today , case discussed, Will follow recommendations. Case also discussed with Medicine and SW. Discharge plan is in progress.    Spoke with staff, patient was moved to a new room to accommodate her wishes.

## 2018-10-30 NOTE — PROGRESS NOTE ADULT - SUBJECTIVE AND OBJECTIVE BOX
CHIEF COMPLAINT: Emotional today. No new deficits.       HISTORY OF PRESENT ILLNESS  67 yo female ex smoker w/hx of lung ca (5/18 VATs/lobectomy), HTN and Afib on Eliquis and ASA presents to [facility] with sudden onset left side weakness and dysarthria. CT head shows acute pontine ICH. CTA head neg stenosis R, Left ICA 50%. . Eliquis held. Hypertensive on admission, BP>200. No neurosurgical intervention. Seen by neurology. BP control. Resume ASA/Eliquis 10-14 days per Neuro plan. Stable for acute inpt rehab. (18 Oct 2018 10:46)      PAST MEDICAL & SURGICAL HISTORY:  Lung cancer  Atrial fibrillation  Endometriosis  Hypertension  Fibroid tumor  History of ovarian cyst        REVIEW OF SYMPTOMS  [X] Constitutional WNL     [X] Cardio WNL            [X] Resp WNL           [X] GI WNL                          [X]  WNL                   [X] Heme WNL              [X] Endo WNL                     [X] Skin WNL                 [X] MSK WNL                               [X] Cognitive WNL             VITALS  Vital Signs Last 24 Hrs  T(C): 36.7 (30 Oct 2018 07:52), Max: 36.8 (29 Oct 2018 22:10)  T(F): 98.1 (30 Oct 2018 07:52), Max: 98.2 (29 Oct 2018 22:10)  HR: 65 (30 Oct 2018 07:52) (65 - 77)  BP: 148/75 (30 Oct 2018 07:52) (148/75 - 160/80)  BP(mean): --  RR: 14 (30 Oct 2018 07:52) (14 - 14)  SpO2: 96% (30 Oct 2018 07:52) (96% - 96%)      PHYSICAL EXAM  Constitutional - NAD, Comfortable  HEENT - NCAT, EOMI  Neck - Supple, No limited ROM  Chest - CTA bilaterally, No wheeze, No rhonchi, No crackles  Cardiovascular - RRR, S1S2, No murmurs  Abdomen - BS+, Soft, NTND  Extremities - No C/C/E, No calf tenderness   Skin-no rash  Wounds-na      Neurologic Exam - no new deficits.                 FUNCTIONAL PROGRESS  Gait - CG A  ADLs - CG A  Transfers - min/CG A  Functional transfer -CG/mod I A      RECENT LABS                        12.3   13.3  )-----------( 380      ( 29 Oct 2018 05:45 )             37.3     10-29    137  |  104  |  25<H>  ----------------------------<  106<H>  4.5   |  25  |  1.09    Ca    8.8      29 Oct 2018 05:45    TPro  6.2  /  Alb  2.6<L>  /  TBili  0.2  /  DBili  x   /  AST  10  /  ALT  17  /  AlkPhos  105  10-29      LIVER FUNCTIONS - ( 29 Oct 2018 05:45 )  Alb: 2.6 g/dL / Pro: 6.2 g/dL / ALK PHOS: 105 U/L / ALT: 17 U/L DA / AST: 10 U/L / GGT: x                           RADIOLOGY/OTHER RESULTS      CURRENT MEDICATIONS  MEDICATIONS  (STANDING):  ALPRAZolam 0.25 milliGRAM(s) Oral at bedtime  diltiazem    milliGRAM(s) Oral <User Schedule>  escitalopram 10 milliGRAM(s) Oral daily  hydrALAZINE 50 milliGRAM(s) Oral every 8 hours  lisinopril 20 milliGRAM(s) Oral daily  metoprolol tartrate 25 milliGRAM(s) Oral two times a day    MEDICATIONS  (PRN):  ALPRAZolam 0.125 milliGRAM(s) Oral every 6 hours PRN anxiety      ASSESSMENT & PLAN      GI/Bowel Management -hold laxatives   Management - Toilet Q2  Skin - Turn Q2  Pain - Tylenol PRN  DVT PPX - TEDs, SCDs  Diet -reg c thins       Continue comprehensive acute rehab program consisting of 3hrs/day of OT/PT and SLP.

## 2018-10-31 ENCOUNTER — TRANSCRIPTION ENCOUNTER (OUTPATIENT)
Age: 68
End: 2018-10-31

## 2018-10-31 LAB
ALBUMIN SERPL ELPH-MCNC: 2.4 G/DL — LOW (ref 3.3–5)
ALP SERPL-CCNC: 103 U/L — SIGNIFICANT CHANGE UP (ref 40–120)
ALT FLD-CCNC: 12 U/L DA — SIGNIFICANT CHANGE UP (ref 10–45)
ANION GAP SERPL CALC-SCNC: 11 MMOL/L — SIGNIFICANT CHANGE UP (ref 5–17)
AST SERPL-CCNC: 11 U/L — SIGNIFICANT CHANGE UP (ref 10–40)
BILIRUB SERPL-MCNC: 0.2 MG/DL — SIGNIFICANT CHANGE UP (ref 0.2–1.2)
BUN SERPL-MCNC: 26 MG/DL — HIGH (ref 7–23)
CALCIUM SERPL-MCNC: 8.7 MG/DL — SIGNIFICANT CHANGE UP (ref 8.4–10.5)
CHLORIDE SERPL-SCNC: 104 MMOL/L — SIGNIFICANT CHANGE UP (ref 96–108)
CO2 SERPL-SCNC: 22 MMOL/L — SIGNIFICANT CHANGE UP (ref 22–31)
CREAT SERPL-MCNC: 1.01 MG/DL — SIGNIFICANT CHANGE UP (ref 0.5–1.3)
GLUCOSE SERPL-MCNC: 100 MG/DL — HIGH (ref 70–99)
HCT VFR BLD CALC: 38.1 % — SIGNIFICANT CHANGE UP (ref 34.5–45)
HGB BLD-MCNC: 12.4 G/DL — SIGNIFICANT CHANGE UP (ref 11.5–15.5)
MCHC RBC-ENTMCNC: 29.2 PG — SIGNIFICANT CHANGE UP (ref 27–34)
MCHC RBC-ENTMCNC: 32.6 GM/DL — SIGNIFICANT CHANGE UP (ref 32–36)
MCV RBC AUTO: 89.4 FL — SIGNIFICANT CHANGE UP (ref 80–100)
PLATELET # BLD AUTO: 395 K/UL — SIGNIFICANT CHANGE UP (ref 150–400)
POTASSIUM SERPL-MCNC: 4.5 MMOL/L — SIGNIFICANT CHANGE UP (ref 3.5–5.3)
POTASSIUM SERPL-SCNC: 4.5 MMOL/L — SIGNIFICANT CHANGE UP (ref 3.5–5.3)
PROT SERPL-MCNC: 5.9 G/DL — LOW (ref 6–8.3)
RBC # BLD: 4.27 M/UL — SIGNIFICANT CHANGE UP (ref 3.8–5.2)
RBC # FLD: 12.6 % — SIGNIFICANT CHANGE UP (ref 10.3–14.5)
SODIUM SERPL-SCNC: 137 MMOL/L — SIGNIFICANT CHANGE UP (ref 135–145)
WBC # BLD: 13.2 K/UL — HIGH (ref 3.8–10.5)
WBC # FLD AUTO: 13.2 K/UL — HIGH (ref 3.8–10.5)

## 2018-10-31 PROCEDURE — 99233 SBSQ HOSP IP/OBS HIGH 50: CPT

## 2018-10-31 PROCEDURE — 99232 SBSQ HOSP IP/OBS MODERATE 35: CPT

## 2018-10-31 PROCEDURE — 70450 CT HEAD/BRAIN W/O DYE: CPT | Mod: 26

## 2018-10-31 RX ORDER — LISINOPRIL 2.5 MG/1
30 TABLET ORAL DAILY
Qty: 0 | Refills: 0 | Status: DISCONTINUED | OUTPATIENT
Start: 2018-10-31 | End: 2018-11-02

## 2018-10-31 RX ADMIN — Medication 25 MILLIGRAM(S): at 05:16

## 2018-10-31 RX ADMIN — Medication 25 MILLIGRAM(S): at 19:17

## 2018-10-31 RX ADMIN — ESCITALOPRAM OXALATE 10 MILLIGRAM(S): 10 TABLET, FILM COATED ORAL at 11:40

## 2018-10-31 RX ADMIN — Medication 50 MILLIGRAM(S): at 05:16

## 2018-10-31 RX ADMIN — Medication 50 MILLIGRAM(S): at 21:05

## 2018-10-31 RX ADMIN — LISINOPRIL 20 MILLIGRAM(S): 2.5 TABLET ORAL at 05:16

## 2018-10-31 RX ADMIN — Medication 0.25 MILLIGRAM(S): at 21:05

## 2018-10-31 RX ADMIN — Medication 50 MILLIGRAM(S): at 13:18

## 2018-10-31 RX ADMIN — Medication 240 MILLIGRAM(S): at 21:05

## 2018-10-31 NOTE — DISCHARGE NOTE ADULT - OTHER SIGNIFICANT FINDINGS
Repeat head CT on 10/31/18:  < from: CT Head No Cont (10.31.18 @ 13:17) >  EXAM:  CT BRAIN      PROCEDURE DATE:  10/31/2018        INTERPRETATION:  BRAIN CT    CLINICAL INFORMATION: Pontine hemorrhage, CVA    TECHNIQUE: CT axial images of the head without contrast. Coronal and   sagittal images were reconstructed.      COMPARISON: MR brain 10/15/2018, CT head 10/13/2018    FINDINGS:    Focus of hypoattenuation within the richard corresponds to previously noted   hemorrhagic infarct, is slightly decreased in size.    There is no mass effect or midline shift. The basal cisterns are not   effaced. The ventricles are normal in size without hydrocephalus.    Moderate microangiopathic white matter disease, unchanged.    The imaged portions of the paranasal sinuses and mastoid air cells are   well aerated. The visualized osseous structures are unremarkable    IMPRESSION:    Focus of hypoattenuation within the richard corresponds to previously noted   hemorrhagic infarct, is slightly decreased in size. No new hemorrhage,   hydrocephalus or midline shift.    BRUCE NINO   This document has been electronically signed. Oct 31 2018  1:44PM    < end of copied text >

## 2018-10-31 NOTE — DISCHARGE NOTE ADULT - CARE PROVIDER_API CALL
Aaron Mancini), Family Medicine  [address] [place] [address]  Phone: [phone]  Fax: [phone] Aaron Mancini), Family Medicine  [address] [place] [address]  Phone: [phone]  Fax: [phone]    Maged Rodas (MD; PhD), Neurology; Vascular Neurology  [address] [facility] [address]  [place] [address]  Phone: [phone]  Fax: [phone]

## 2018-10-31 NOTE — PROGRESS NOTE ADULT - SUBJECTIVE AND OBJECTIVE BOX
Psychiatry Consultation-Liaison Follow-up Note  68y  Encounter: 10/31/2018  Chart reviewed,  	  Met with-patient  Case Discussed with-Nursing    Interval History:    Symptom progression-    Medication Changes-  ALPRAZolam 0.125 milliGRAM(s) Oral every 6 hours PRN  ALPRAZolam 0.25 milliGRAM(s) Oral at bedtime  diltiazem    milliGRAM(s) Oral <User Schedule>  escitalopram 10 milliGRAM(s) Oral daily  hydrALAZINE 50 milliGRAM(s) Oral every 8 hours  lisinopril 20 milliGRAM(s) Oral daily  metoprolol tartrate 25 milliGRAM(s) Oral two times a day      Mental Status Examination:      Studies:    Laboratory testing-                        12.4   13.2  )-----------( 395      ( 31 Oct 2018 05:45 )             38.1     10-31    137  |  104  |  26<H>  ----------------------------<  100<H>  4.5   |  22  |  1.01    Ca    8.7      31 Oct 2018 05:45    TPro  5.9<L>  /  Alb  2.4<L>  /  TBili  0.2  /  DBili  x   /  AST  11  /  ALT  12  /  AlkPhos  103  10-31    LIVER FUNCTIONS - ( 31 Oct 2018 05:45 )  Alb: 2.4 g/dL / Pro: 5.9 g/dL / ALK PHOS: 103 U/L / ALT: 12 U/L DA / AST: 11 U/L / GGT: x             Assessment:    Recommendations:         Medication-       Referrals-     Hospital course Follow-up Psychiatry Consultation-Liaison Follow-up Note  68y  Encounter: 10/31/2018  Chart reviewed,  	  Met with-patient  Case Discussed with-Nursing    Identifying Data:   68 year old Single Female who presents with anxiety post CVA.     Interval History:  Patient initially seen by C/L service on October 18th for anxiety in the context of CVA. Pt states she slept well last night, feeling refreshed today.  Mood is neutral, "I'm ok, not great, not bad."  Reports good appetite with good po intake.  Denies anxiousness presently, did not utilize prn Xanax today.  Feels she is making gains in her therapies and is eager to get back to her life.  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     Mental Status Examination:  General Appearance: Blonde female looks her stated age, good eye contact.   Remarkable Features: None  Psychomotor State: calm.   Abnormal movements and posture: see physiatry notes.   Social interaction and affect: Normal tone and volume, socially appropriate, calm.  Cognition, perceptual abnormalities: none   Consciousness: Alert   Orientation: to person and place, time.   Memory: Recent/Past/Remote: Grossly intact.   Attention: some deficits remain.   Insight/Judgment: Good     Medication Changes-  ALPRAZolam 0.125 milliGRAM(s) Oral every 6 hours PRN  ALPRAZolam 0.25 milliGRAM(s) Oral at bedtime  diltiazem    milliGRAM(s) Oral <User Schedule>  escitalopram 10 milliGRAM(s) Oral daily  hydrALAZINE 50 milliGRAM(s) Oral every 8 hours  lisinopril 20 milliGRAM(s) Oral daily  metoprolol tartrate 25 milliGRAM(s) Oral two times a day    Studies:    Laboratory testing-                        12.4   13.2  )-----------( 395      ( 31 Oct 2018 05:45 )             38.1     10-31    137  |  104  |  26<H>  ----------------------------<  100<H>  4.5   |  22  |  1.01    Ca    8.7      31 Oct 2018 05:45    TPro  5.9<L>  /  Alb  2.4<L>  /  TBili  0.2  /  DBili  x   /  AST  11  /  ALT  12  /  AlkPhos  103  10-31    LIVER FUNCTIONS - ( 31 Oct 2018 05:45 )  Alb: 2.4 g/dL / Pro: 5.9 g/dL / ALK PHOS: 103 U/L / ALT: 12 U/L DA / AST: 11 U/L / GGT: x           Psychiatric Diagnosis: Anxiety due to General Medical condition.  Left pontine CVA.     Continue Lexapro 10 mg po daily, Xanax 0.25 mg po QHS & 0.125 mg po Q6H prn as needed for anxiousness. Psychiatry Consultation-Liaison Follow-up Note  68y  Encounter: 10/31/2018  Chart reviewed,  	  Met with-patient  Case Discussed with-Nursing    Identifying Data:   68 year old Single Female who presents with anxiety post CVA.     Interval History:  Patient initially seen by C/L service on October 18th for anxiety in the context of CVA. Pt states she slept well last night, feeling refreshed today.  Mood is neutral, "I'm ok, not great, not bad."  Reports good appetite with good po intake.  Denies anxiousness presently, did not utilize prn Xanax today.  Feels she is making gains in her therapies and is eager to get back to her life.  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     Mental Status Examination:  General Appearance: Blonde female looks her stated age, good eye contact.   Remarkable Features: None  Psychomotor State: calm.   Abnormal movements and posture: see physiatry notes.   Social interaction and affect: Normal tone and volume, socially appropriate, calm.  Cognition, perceptual abnormalities: none   Consciousness: Alert   Orientation: to person and place, time.   Memory: Recent/Past/Remote: Grossly intact.   Attention: some deficits remain.   Insight/Judgment: Good     Medication Changes-  ALPRAZolam 0.125 milliGRAM(s) Oral every 6 hours PRN  ALPRAZolam 0.25 milliGRAM(s) Oral at bedtime  diltiazem    milliGRAM(s) Oral <User Schedule>  escitalopram 10 milliGRAM(s) Oral daily  hydrALAZINE 50 milliGRAM(s) Oral every 8 hours  lisinopril 20 milliGRAM(s) Oral daily  metoprolol tartrate 25 milliGRAM(s) Oral two times a day    Studies:    Laboratory testing-                        12.4   13.2  )-----------( 395      ( 31 Oct 2018 05:45 )             38.1     10-31    137  |  104  |  26<H>  ----------------------------<  100<H>  4.5   |  22  |  1.01    Ca    8.7      31 Oct 2018 05:45    TPro  5.9<L>  /  Alb  2.4<L>  /  TBili  0.2  /  DBili  x   /  AST  11  /  ALT  12  /  AlkPhos  103  10-31    LIVER FUNCTIONS - ( 31 Oct 2018 05:45 )  Alb: 2.4 g/dL / Pro: 5.9 g/dL / ALK PHOS: 103 U/L / ALT: 12 U/L DA / AST: 11 U/L / GGT: x           VITALS  Vital Signs Last 24 Hrs  T(C): 37.2 (31 Oct 2018 07:55), Max: 37.2 (30 Oct 2018 20:55)  T(F): 98.9 (31 Oct 2018 07:55), Max: 98.9 (30 Oct 2018 20:55)  HR: 65 (31 Oct 2018 07:55) (62 - 73)  BP: 150/66 (31 Oct 2018 07:55) (146/76 - 150/77)  BP(mean): --  RR: 14 (31 Oct 2018 07:55) (14 - 14)  SpO2: 96% (31 Oct 2018 07:55) (95% - 96%)      Psychiatric Diagnosis: Anxiety due to General Medical condition.  Left pontine CVA.     Continue Lexapro 10 mg po daily, Xanax 0.25 mg po QHS & 0.125 mg po Q6H prn as needed for anxiousness.

## 2018-10-31 NOTE — DISCHARGE NOTE ADULT - ADDITIONAL INSTRUCTIONS
Follow up with Neurology Dr in 1 wek. Follow up with Cardiology Dr in 1 week and PCP in 1 week. Follow up with Neurology Dr in 1 week. Follow up with Cardiology  in 1 week and PCP Dr Mancini in 1 week. Follow up with Neurology Dr Rodas in 1 week. Follow up with Cardiology/PCP in 1 week and PCP Dr Mancini in 1 week.

## 2018-10-31 NOTE — DISCHARGE NOTE ADULT - PATIENT PORTAL LINK FT
You can access the [facility] Patient Portal, offered by [facility], by registering with the following website: [email or web address]

## 2018-10-31 NOTE — DISCHARGE NOTE ADULT - NS AS ACTIVITY OBS
Showering allowed/Do not make important decisions/Do not drive or operate machinery/No Heavy lifting/straining/Walking-Indoors allowed/Sex allowed/Stairs allowed/Walking-Outdoors allowed No Heavy lifting/straining/Do not make important decisions/Do not drive or operate machinery/Showering allowed/Stairs allowed

## 2018-10-31 NOTE — DISCHARGE NOTE ADULT - CARE PLAN
Principal Discharge DX:	CVA (cerebral vascular accident)  Goal:	no new stroke  Assessment and plan of treatment:	Follow up with Neurology and Cardiology in 1 week. Discuss anticoagulation need with your providers and need for any further imaging such as CT head as follow up. Continue medications as prescribed.  Secondary Diagnosis:	Hypertension  Goal:	-140  Assessment and plan of treatment:	Continue BP regimen as prescribed, check BP daily. Follow up with PCP, Neurology and Cardiology.  Secondary Diagnosis:	Atrial fibrillation  Goal:	no palpitations.  Assessment and plan of treatment:	Follow up with Neurology and Cardiology regarding further anticoagulation recommendations.  Secondary Diagnosis:	Leukocytosis  Goal:	normal WBC count  Assessment and plan of treatment:	Follow up with your PCP for further recommendations. Principal Discharge DX:	CVA (cerebral vascular accident)  Goal:	no new stroke  Assessment and plan of treatment:	Follow up with Neurology and Cardiology in 1 week. Discuss anticoagulation need with your providers and further plan. Continue medications as prescribed.  Secondary Diagnosis:	Hypertension  Goal:	-140  Assessment and plan of treatment:	Continue BP regimen as prescribed, check BP daily. Follow up with PCP, Neurology and Cardiology.  Secondary Diagnosis:	Atrial fibrillation  Goal:	no palpitations.  Assessment and plan of treatment:	Follow up with Neurology and Cardiology regarding further anticoagulation recommendations.  Secondary Diagnosis:	Leukocytosis  Goal:	normal WBC count  Assessment and plan of treatment:	Follow up with your PCP for further recommendations.

## 2018-10-31 NOTE — DISCHARGE NOTE ADULT - CARE PROVIDERS DIRECT ADDRESSES
,DirectAddress_Unknown ,DirectAddress_Unknown,[email or web address]

## 2018-10-31 NOTE — DISCHARGE NOTE ADULT - MEDICATION SUMMARY - MEDICATIONS TO CHANGE
I will SWITCH the dose or number of times a day I take the medications listed below when I get home from the hospital:    lisinopril 20 mg oral tablet  -- 1 tab(s) by mouth once a day    hydrALAZINE 25 mg oral tablet  -- 1 tab(s) by mouth every 8 hours

## 2018-10-31 NOTE — DISCHARGE NOTE ADULT - MEDICATION SUMMARY - MEDICATIONS TO TAKE
I will START or STAY ON the medications listed below when I get home from the hospital:    lisinopril 30 mg oral tablet  -- 1 tab(s) by mouth once a day  -- Indication: For Hypertension    dilTIAZem 240 mg/24 hours oral capsule, extended release  -- 1 cap(s) by mouth once a day (at bedtime)  -- Indication: For Atrial fibrillation    escitalopram 10 mg oral tablet  -- 1 tab(s) by mouth once a day  -- Indication: For Anxiety     ALPRAZolam 0.25 mg oral tablet  -- 1 tab(s) by mouth once a day (at bedtime)  -- Indication: For Anxiety     metoprolol tartrate 25 mg oral tablet  -- 1 tab(s) by mouth 2 times a day  -- Indication: For Hypertension    hydrALAZINE 50 mg oral tablet  -- 1 tab(s) by mouth every 8 hours  -- Indication: For Hypertension    Vitamin B12 500 mcg oral tablet  -- 1 tab(s) by mouth once a day  -- Indication: For supplement     Vitamin D3 1000 intl units oral capsule  -- 1 cap(s) by mouth once a day  -- Indication: For supplement I will START or STAY ON the medications listed below when I get home from the hospital:    aspirin 81 mg oral tablet, chewable  -- 1 tab(s) by mouth once a day  -- Indication: For CVA (cerebral vascular accident)    lisinopril 30 mg oral tablet  -- 1 tab(s) by mouth once a day  -- Indication: For Hypertension    dilTIAZem 240 mg/24 hours oral capsule, extended release  -- 1 cap(s) by mouth once a day (at bedtime)  -- Indication: For Atrial fibrillation    escitalopram 10 mg oral tablet  -- 1 tab(s) by mouth once a day  -- Indication: For CVA (cerebral vascular accident)    ALPRAZolam 0.25 mg oral tablet  -- 1 tab(s) by mouth once a day (at bedtime) MDD:1 tablet daily  -- Indication: For Anxiety    metoprolol tartrate 25 mg oral tablet  -- 1 tab(s) by mouth 2 times a day  -- Indication: For Hypertension    hydrALAZINE 50 mg oral tablet  -- 1 tab(s) by mouth every 8 hours  -- Indication: For Hypertension    Vitamin B12 500 mcg oral tablet  -- 1 tab(s) by mouth once a day  -- Indication: For supplement     Vitamin D3 1000 intl units oral capsule  -- 1 cap(s) by mouth once a day  -- Indication: For supplement

## 2018-10-31 NOTE — PROGRESS NOTE ADULT - ATTENDING COMMENTS
Anahy today, participates in therapy and offers no new complaints.  Neurological exam is steadily improving.    As per primary neurology will repeat Head CT to plan further stroke ppx therapy. If radiologically improved will consider antiplatelet therapy before restarting full dose AC. Plan discussed with patient. She is very reluctant to restart Eliquis or any othe AC agent in rehab. She wants to discuss treatment options with her PCP after discharge.    Multidisciplinary team meeting today:  patient's functional goals and needs, functional and clinical  progress were discussed, barriers to discharge were identified. Anticipate discharge home with home care,    EDOD 11/5/18 when she  is expected to be ready by the team.

## 2018-10-31 NOTE — DISCHARGE NOTE ADULT - HOSPITAL COURSE
67 yo female ex smoker w/hx of lung ca (5/18 VATs/lobectomy), HTN and Afib on Eliquis and ASA presents to [facility] with sudden onset left side weakness and dysarthria. CT head shows acute pontine ICH. CTA head neg stenosis R, Left ICA 50%. . Eliquis held. Hypertensive on admission, BP>200. No neurosurgical intervention. Seen by neurology. BP control. Resume ASA/Eliquis after 2 weeks pending stability/repeat imaging. Stable for acute inpt rehab.  At [place] acute rehab, asymptomatic leukocytosis observed-seen by medicine. Psychiatry evaluation for severe anxiety/depression. Xanax and Lexapro regimen started. Patient completed comprehensive PT/OT/SLP program and is stable for d/c home c supervision on 67 yo female ex smoker w/hx of lung ca (5/18 VATs/lobectomy), HTN and Afib on Eliquis and ASA presents to [facility] with sudden onset left side weakness and dysarthria. CT head shows acute pontine ICH. CTA head neg stenosis R, Left ICA 50%. . Eliquis held. Hypertensive on admission, BP>200. No neurosurgical intervention. Seen by neurology. BP control. Resume ASA/Eliquis after 2 weeks pending stability/repeat imaging. Stable for acute inpt rehab.  At [place] acute rehab, asymptomatic leukocytosis observed-seen by medicine. Psychiatry evaluation for severe anxiety/depression. Xanax and Lexapro regimen started. Followed by psychiatry and recommend continue regemin of lexapro and xanax.  Patient completed comprehensive PT/OT/SLP program and is stable for d/c home c supervision on 11/2/18. 67 yo female ex smoker w/hx of lung ca (5/18 VATs/lobectomy), HTN and Afib on Eliquis and ASA presents to [facility] with sudden onset left side weakness and dysarthria. CT head shows acute pontine ICH. CTA head neg stenosis R, Left ICA 50%. . Eliquis held. Hypertensive on admission, BP>200. No neurosurgical intervention. Seen by neurology. BP control. Resume ASA/Eliquis after 2 weeks pending stability/repeat imaging. Stable for acute inpt rehab.  At [place] acute rehab, asymptomatic leukocytosis observed-seen by medicine. Psychiatry evaluation for severe anxiety/depression. Xanax and Lexapro regimen started. Followed by psychiatry and recommend continue regimen of lexapro and xanax.   Repeat CT head on 10/31 with decreased size in hemorrhagic infarct. Patient uncomfortable with restarting Eliquis at this time. Agreeable to starting low dose aspirin and following up with PCP next week to further discuss resuming full dose anticoagulation for PAfib.   Patient completed comprehensive PT/OT/SLP program and is stable for d/c home c supervision on 11/2/18.

## 2018-10-31 NOTE — DISCHARGE NOTE ADULT - PLAN OF CARE
Follow up with Neurology and Cardiology in 1 week. Discuss anticoagulation need with your providers and need for any further imaging such as CT head as follow up. Continue medications as prescribed. -140 Continue BP regimen as prescribed, check BP daily. Follow up with PCP, Neurology and Cardiology. no palpitations. Follow up with Neurology and Cardiology regarding further anticoagulation recommendations. normal WBC count Follow up with your PCP for further recommendations. no new stroke Follow up with Neurology and Cardiology in 1 week. Discuss anticoagulation need with your providers and further plan. Continue medications as prescribed.

## 2018-10-31 NOTE — PROGRESS NOTE ADULT - SUBJECTIVE AND OBJECTIVE BOX
CHIEF COMPLAINT: No acute events overnight, afebrile. Denies any pain or palpitations.       HISTORY OF PRESENT ILLNESS  67 yo female ex smoker w/hx of lung ca (5/18 VATs/lobectomy), HTN and Afib on Eliquis and ASA presents to [facility] with sudden onset left side weakness and dysarthria. CT head shows acute pontine ICH. CTA head neg stenosis R, Left ICA 50%. . Eliquis held. Hypertensive on admission, BP>200. No neurosurgical intervention. Seen by neurology. BP control. Resume ASA/Eliquis 10-14 days per Neuro plan. Stable for acute inpt rehab. (18 Oct 2018 10:46)        PAST MEDICAL & SURGICAL HISTORY:  Lung cancer  Atrial fibrillation  Endometriosis  Hypertension  Fibroid tumor  History of ovarian cyst       REVIEW OF SYMPTOMS  [X] Constitutional WNL     [X] Cardio WNL            [X] Resp WNL           [X] GI WNL                          [X]  WNL                   [X] Heme WNL              [X] Endo WNL                     [X] Skin WNL                 [X] MSK WNL                             [X] Cognitive WNL        [X] Psych WNL      VITALS  Vital Signs Last 24 Hrs  T(C): 37.2 (31 Oct 2018 07:55), Max: 37.2 (30 Oct 2018 20:55)  T(F): 98.9 (31 Oct 2018 07:55), Max: 98.9 (30 Oct 2018 20:55)  HR: 65 (31 Oct 2018 07:55) (62 - 73)  BP: 150/66 (31 Oct 2018 07:55) (146/76 - 150/77)  BP(mean): --  RR: 14 (31 Oct 2018 07:55) (14 - 14)  SpO2: 96% (31 Oct 2018 07:55) (95% - 96%)      PHYSICAL EXAM  Constitutional - NAD, Comfortable  HEENT - NCAT, EOMI  Neck - Supple, No limited ROM  Chest - CTA bilaterally, No wheeze, No rhonchi, No crackles  Cardiovascular - RRR, S1S2, No murmurs  Abdomen - BS+, Soft, NTND  Extremities - No C/C/E, No calf tenderness   Skin-no rash  Wounds-na    Neuro exam-no new deficits.     FUNCTIONAL PROGRESS  Gait - CG/supervision  ADLs - supervision  Transfers -CG/supervision  Functional transfer - supervision    RECENT LABS                        12.4   13.2  )-----------( 395      ( 31 Oct 2018 05:45 )             38.1     10-31    137  |  104  |  26<H>  ----------------------------<  100<H>  4.5   |  22  |  1.01    Ca    8.7      31 Oct 2018 05:45    TPro  5.9<L>  /  Alb  2.4<L>  /  TBili  0.2  /  DBili  x   /  AST  11  /  ALT  12  /  AlkPhos  103  10-31      LIVER FUNCTIONS - ( 31 Oct 2018 05:45 )  Alb: 2.4 g/dL / Pro: 5.9 g/dL / ALK PHOS: 103 U/L / ALT: 12 U/L DA / AST: 11 U/L / GGT: x                           RADIOLOGY/OTHER RESULTS      CURRENT MEDICATIONS  MEDICATIONS  (STANDING):  ALPRAZolam 0.25 milliGRAM(s) Oral at bedtime  diltiazem    milliGRAM(s) Oral <User Schedule>  escitalopram 10 milliGRAM(s) Oral daily  hydrALAZINE 50 milliGRAM(s) Oral every 8 hours  lisinopril 20 milliGRAM(s) Oral daily  metoprolol tartrate 25 milliGRAM(s) Oral two times a day    MEDICATIONS  (PRN):  ALPRAZolam 0.125 milliGRAM(s) Oral every 6 hours PRN anxiety      ASSESSMENT & PLAN      GI/Bowel Management -hold laxatives   Management - Toilet Q2  Skin - Turn Q2  Pain - Tylenol PRN  DVT PPX - TEDs, SCDs  Diet -reg c thins       Continue comprehensive acute rehab program consisting of 3hrs/day of OT/PT and SLP.

## 2018-10-31 NOTE — PROGRESS NOTE ADULT - SUBJECTIVE AND OBJECTIVE BOX
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 (31 Oct 2018 12:43)      Patient seen and examined at bedside.  Patient denies any headaches, chest pain, shortness of breath, or bowel problems.    ALLERGIES:  No Known Allergies    MEDICATIONS:  ALPRAZolam 0.25 milliGRAM(s) Oral at bedtime  ALPRAZolam 0.125 milliGRAM(s) Oral every 6 hours PRN  diltiazem    milliGRAM(s) Oral <User Schedule>  escitalopram 10 milliGRAM(s) Oral daily  hydrALAZINE 50 milliGRAM(s) Oral every 8 hours  lisinopril 30 milliGRAM(s) Oral daily    Vital Signs Last 24 Hrs  T(F): 98.9 (31 Oct 2018 07:55), Max: 98.9 (30 Oct 2018 20:55)  HR: 65 (31 Oct 2018 07:55) (62 - 73)  BP: 150/66 (31 Oct 2018 07:55) (146/76 - 150/77)  RR: 14 (31 Oct 2018 07:55) (14 - 14)  SpO2: 96% (31 Oct 2018 07:55) (95% - 96%)  I&O's Summary      PHYSICAL EXAM:  General: NAD, A/O x 3  ENT: MMM  Neck: Supple, No JVD  Lungs: Clear to auscultation bilaterally  Cardio:irregular rhythm, S1/S2, No murmurs  Abdomen: Soft, Nontender, Nondistended; Bowel sounds present  Extremities: No cyanosis, No edema    LABS:                        12.4   13.2  )-----------( 395      ( 31 Oct 2018 05:45 )             38.1     10-31    137  |  104  |  26  ----------------------------<  100  4.5   |  22  |  1.01    Ca    8.7      31 Oct 2018 05:45    TPro  5.9  /  Alb  2.4  /  TBili  0.2  /  DBili  x   /  AST  11  /  ALT  12  /  AlkPhos  103  10-31    eGFR if Non African American: 57 mL/min/1.73M2 (10-31-18 @ 05:45)  eGFR if African American: 66 mL/min/1.73M2 (10-31-18 @ 05:45)      Care Discussed with Consultants/Other Providers: Dr. Lovell

## 2018-10-31 NOTE — DISCHARGE NOTE ADULT - MEDICATION SUMMARY - MEDICATIONS TO STOP TAKING
I will STOP taking the medications listed below when I get home from the hospital:    budesonide-formoterol 160 mcg-4.5 mcg/inh inhalation aerosol  -- 160 microgram(s) inhaled 2 times a day

## 2018-11-01 PROCEDURE — 99232 SBSQ HOSP IP/OBS MODERATE 35: CPT

## 2018-11-01 RX ORDER — LISINOPRIL 2.5 MG/1
1 TABLET ORAL
Qty: 0 | Refills: 0 | COMMUNITY
Start: 2018-11-01

## 2018-11-01 RX ORDER — ESCITALOPRAM OXALATE 10 MG/1
1 TABLET, FILM COATED ORAL
Qty: 30 | Refills: 0
Start: 2018-11-01 | End: 2018-11-30

## 2018-11-01 RX ORDER — ALPRAZOLAM 0.25 MG
0.5 TABLET ORAL
Qty: 0 | Refills: 0 | COMMUNITY

## 2018-11-01 RX ORDER — ESCITALOPRAM OXALATE 10 MG/1
1 TABLET, FILM COATED ORAL
Qty: 0 | Refills: 0 | COMMUNITY
Start: 2018-11-01

## 2018-11-01 RX ORDER — METOPROLOL TARTRATE 50 MG
1 TABLET ORAL
Qty: 60 | Refills: 0
Start: 2018-11-01 | End: 2018-11-30

## 2018-11-01 RX ORDER — DILTIAZEM HCL 120 MG
1 CAPSULE, EXT RELEASE 24 HR ORAL
Qty: 0 | Refills: 0 | COMMUNITY

## 2018-11-01 RX ORDER — LISINOPRIL 2.5 MG/1
1 TABLET ORAL
Qty: 30 | Refills: 0
Start: 2018-11-01 | End: 2018-11-30

## 2018-11-01 RX ORDER — ALPRAZOLAM 0.25 MG
1 TABLET ORAL
Qty: 7 | Refills: 0
Start: 2018-11-01 | End: 2018-11-07

## 2018-11-01 RX ORDER — ALPRAZOLAM 0.25 MG
1 TABLET ORAL
Qty: 0 | Refills: 0 | COMMUNITY
Start: 2018-11-01

## 2018-11-01 RX ORDER — ASPIRIN/CALCIUM CARB/MAGNESIUM 324 MG
81 TABLET ORAL DAILY
Qty: 0 | Refills: 0 | Status: DISCONTINUED | OUTPATIENT
Start: 2018-11-01 | End: 2018-11-02

## 2018-11-01 RX ORDER — DILTIAZEM HCL 120 MG
1 CAPSULE, EXT RELEASE 24 HR ORAL
Qty: 0 | Refills: 0 | DISCHARGE
Start: 2018-11-01 | End: 2018-11-30

## 2018-11-01 RX ORDER — DILTIAZEM HCL 120 MG
1 CAPSULE, EXT RELEASE 24 HR ORAL
Qty: 0 | Refills: 0 | COMMUNITY
Start: 2018-11-01

## 2018-11-01 RX ORDER — ASPIRIN/CALCIUM CARB/MAGNESIUM 324 MG
1 TABLET ORAL
Qty: 30 | Refills: 0
Start: 2018-11-01 | End: 2018-11-30

## 2018-11-01 RX ORDER — HYDRALAZINE HCL 50 MG
1 TABLET ORAL
Qty: 90 | Refills: 0
Start: 2018-11-01 | End: 2018-11-30

## 2018-11-01 RX ORDER — METOPROLOL TARTRATE 50 MG
1 TABLET ORAL
Qty: 0 | Refills: 0 | COMMUNITY
Start: 2018-11-01

## 2018-11-01 RX ORDER — HYDRALAZINE HCL 50 MG
1 TABLET ORAL
Qty: 0 | Refills: 0 | COMMUNITY
Start: 2018-11-01

## 2018-11-01 RX ORDER — DILTIAZEM HCL 120 MG
1 CAPSULE, EXT RELEASE 24 HR ORAL
Qty: 30 | Refills: 0
Start: 2018-11-01 | End: 2018-11-30

## 2018-11-01 RX ADMIN — Medication 25 MILLIGRAM(S): at 17:57

## 2018-11-01 RX ADMIN — Medication 50 MILLIGRAM(S): at 22:02

## 2018-11-01 RX ADMIN — LISINOPRIL 30 MILLIGRAM(S): 2.5 TABLET ORAL at 05:34

## 2018-11-01 RX ADMIN — Medication 240 MILLIGRAM(S): at 22:02

## 2018-11-01 RX ADMIN — ESCITALOPRAM OXALATE 10 MILLIGRAM(S): 10 TABLET, FILM COATED ORAL at 11:41

## 2018-11-01 RX ADMIN — Medication 0.25 MILLIGRAM(S): at 22:02

## 2018-11-01 RX ADMIN — Medication 25 MILLIGRAM(S): at 05:34

## 2018-11-01 RX ADMIN — Medication 50 MILLIGRAM(S): at 05:34

## 2018-11-01 RX ADMIN — Medication 50 MILLIGRAM(S): at 14:46

## 2018-11-01 NOTE — PROGRESS NOTE ADULT - ATTENDING COMMENTS
Patient medically stable. Making progress towards rehab goals.   Continue rehab program.   Head CT much improved with resolution of pontine hematoma, will begin ASA 81 mg for CVA ppx. Patient is  strongly refusing  AC at this time.  Discharge plan is in progress.

## 2018-11-01 NOTE — PROGRESS NOTE ADULT - SUBJECTIVE AND OBJECTIVE BOX
CHIEF COMPLAINT: No acute events overnight, no new weakness, no headache or vision changes. Reports feeling less anxious/spirits improved.       HISTORY OF PRESENT ILLNESS  67 yo female ex smoker w/hx of lung ca (5/18 VATs/lobectomy), HTN and Afib on Eliquis and ASA presents to [facility] with sudden onset left side weakness and dysarthria. CT head shows acute pontine ICH. CTA head neg stenosis R, Left ICA 50%. . Eliquis held. Hypertensive on admission, BP>200. No neurosurgical intervention. Seen by neurology. BP control. Resume ASA/Eliquis 10-14 days per Neuro plan. Stable for acute inpt rehab. (18 Oct 2018 10:46)      PAST MEDICAL & SURGICAL HISTORY:  Lung cancer  Atrial fibrillation  Endometriosis  Hypertension  Fibroid tumor  History of ovarian cyst        REVIEW OF SYMPTOMS  [X] Constitutional WNL     [X] Cardio WNL            [X] Resp WNL           [X] GI WNL                          [X]  WNL                   [X] Heme WNL              [X] Endo WNL                     [X] Skin WNL                 [X] MSK WNL                             [X] Cognitive WNL        [X] Psych WNL      VITALS  Vital Signs Last 24 Hrs  T(C): 36.7 (01 Nov 2018 09:43), Max: 36.7 (01 Nov 2018 09:43)  T(F): 98 (01 Nov 2018 09:43), Max: 98 (01 Nov 2018 09:43)  HR: 63 (01 Nov 2018 09:43) (62 - 63)  BP: 126/61 (01 Nov 2018 09:43) (126/61 - 149/79)  BP(mean): --  RR: 14 (01 Nov 2018 09:43) (13 - 14)  SpO2: 95% (01 Nov 2018 09:43) (94% - 95%)      PHYSICAL EXAM  Constitutional - NAD, Comfortable  HEENT - NCAT, EOMI  Neck - Supple, No limited ROM  Chest - CTA bilaterally, No wheeze, No rhonchi, No crackles  Cardiovascular - RRR, S1S2, No murmurs  Abdomen - BS+, Soft, NTND  Extremities - No C/C/E, No calf tenderness   Skin-no rash  Wounds-na      Neuro exam-no new deficits.     FUNCTIONAL PROGRESS  Gait - supervision  ADLs - supervision  Transfers -supervision  Functional transfer - supervision      RECENT LABS                        12.4   13.2  )-----------( 395      ( 31 Oct 2018 05:45 )             38.1     10-31    137  |  104  |  26<H>  ----------------------------<  100<H>  4.5   |  22  |  1.01    Ca    8.7      31 Oct 2018 05:45    TPro  5.9<L>  /  Alb  2.4<L>  /  TBili  0.2  /  DBili  x   /  AST  11  /  ALT  12  /  AlkPhos  103  10-31      LIVER FUNCTIONS - ( 31 Oct 2018 05:45 )  Alb: 2.4 g/dL / Pro: 5.9 g/dL / ALK PHOS: 103 U/L / ALT: 12 U/L DA / AST: 11 U/L / GGT: x                           RADIOLOGY/OTHER RESULTS      CURRENT MEDICATIONS  MEDICATIONS  (STANDING):  ALPRAZolam 0.25 milliGRAM(s) Oral at bedtime  diltiazem    milliGRAM(s) Oral <User Schedule>  escitalopram 10 milliGRAM(s) Oral daily  hydrALAZINE 50 milliGRAM(s) Oral every 8 hours  lisinopril 30 milliGRAM(s) Oral daily  metoprolol tartrate 25 milliGRAM(s) Oral two times a day    MEDICATIONS  (PRN):      ASSESSMENT & PLAN    GI/Bowel Management -hold laxatives   Management - Toilet Q2  Skin - Turn Q2  Pain - Tylenol PRN  DVT PPX - TEDs, SCDs  Diet -reg c thins       Continue comprehensive acute rehab program consisting of 3hrs/day of OT/PT and SLP.

## 2018-11-01 NOTE — CHART NOTE - NSCHARTNOTEFT_GEN_A_CORE
Nutrition follow up     Hospital course as per chart: Pt 69 y/o F with PMH: lung cancer, A-fib, endometriosis, HTN, S/P right pontine ICH with left sided weakness and functional deficits.     Pt reports good appetite and PO intake. Noted 100% PO intake as per flow sheets. Denies difficulty chewing/swallowing. Pt denies nausea, vomiting, diarrhea, or constipation, reports last BM 2 days ago (10/31) - states is normal. Provided brief education on heart healthy nutrition therapy. Pt denies having questions/concerns about diet and nutrition education provided.     Source: Patient [x]    Family [ ]     other [x]; Medical record    Diet: Regular     Enteral /Parenteral Nutrition: n/a    Current Weight: (10/18) 165 pounds - no new weight documented.   % Weight Change: n/a    Pertinent Medications: MEDICATIONS  (STANDING):  ALPRAZolam 0.25 milliGRAM(s) Oral at bedtime  diltiazem    milliGRAM(s) Oral <User Schedule>  escitalopram 10 milliGRAM(s) Oral daily  hydrALAZINE 50 milliGRAM(s) Oral every 8 hours  lisinopril 30 milliGRAM(s) Oral daily  metoprolol tartrate 25 milliGRAM(s) Oral two times a day    Pertinent Labs: (10/31) Glu 100 mg/dL<H> BUN 26 mg/dL<H> GFR 57    Skin: no noted pressure injuries as per documentation.   No noted edema as per flow sheets.     Estimated Needs:   [x] no change since previous assessment  [ ] recalculated:     Previous Nutrition Diagnosis: [x] no previous nutrition diagnosis      Nutrition Diagnosis is [ ] ongoing  [ ] resolved [x] not applicable     New Nutrition Diagnosis: [x] not applicable    Interventions:     1. Recommend continue current diet, consider DASH/TLC.   2. Encourage PO intake, obtain food preferences, provide feeding assistance as needed.   3. Provided brief education on heart healthy nutrition therapy. Recommended limited salt intake. Reviewed foods high in salt and amount of salt recommended per day. Stressed the importance of limited fried foods and saturated fat consumption.   4. Obtain current weight to identify changes if any.     Monitoring and Evaluation:     [x] PO intake [x] Tolerance to diet prescription [x] weights [x] follow up per protocol    [x] other: needs for further education    RD remains available.  Danielle Matos RDN

## 2018-11-01 NOTE — PROGRESS NOTE ADULT - SUBJECTIVE AND OBJECTIVE BOX
Psychiatry Consultation-Liaison Follow-up Note  68y  Encounter: 11/1/2018  Chart reviewed,  	  Met with-patient  Case Discussed with-nursing    Interval History:    Symptom progression-    Medication Changes-  ALPRAZolam 0.25 milliGRAM(s) Oral at bedtime  diltiazem    milliGRAM(s) Oral <User Schedule>  escitalopram 10 milliGRAM(s) Oral daily  hydrALAZINE 50 milliGRAM(s) Oral every 8 hours  lisinopril 30 milliGRAM(s) Oral daily  metoprolol tartrate 25 milliGRAM(s) Oral two times a day    Mental Status Examination:      Studies:    Laboratory testing-                        12.4   13.2  )-----------( 395      ( 31 Oct 2018 05:45 )             38.1     10-31    137  |  104  |  26<H>  ----------------------------<  100<H>  4.5   |  22  |  1.01    Ca    8.7      31 Oct 2018 05:45    TPro  5.9<L>  /  Alb  2.4<L>  /  TBili  0.2  /  DBili  x   /  AST  11  /  ALT  12  /  AlkPhos  103  10-31    LIVER FUNCTIONS - ( 31 Oct 2018 05:45 )  Alb: 2.4 g/dL / Pro: 5.9 g/dL / ALK PHOS: 103 U/L / ALT: 12 U/L DA / AST: 11 U/L / GGT: x             Assessment:    Medication-       Referrals-     Hospital course Follow-up Psychiatry Consultation-Liaison Follow-up Note  68y  Encounter: 11/1/2018  Chart reviewed,  	  Met with-patient  Case Discussed with-nursing    Interval History:    Symptom progression-      Labs:    13.2  )-----------( 395      ( 31 Oct 2018 05:45 )             38.1     10-31    137  |  104  |  26<H>  ----------------------------<  100<H>  4.5   |  22  |  1.01    Ca    8.7      31 Oct 2018 05:45    TPro  5.9<L>  /  Alb  2.4<L>  /  TBili  0.2  /  DBili  x   /  AST  11  /  ALT  12  /  AlkPhos  103  10-31    LIVER FUNCTIONS - ( 31 Oct 2018 05:45 )  Alb: 2.4 g/dL / Pro: 5.9 g/dL / ALK PHOS: 103 U/L / ALT: 12 U/L DA / AST: 11 U/L / GGT: x           VITALS  Vital Signs Last 24 Hrs  T(C): 37.2 (31 Oct 2018 07:55), Max: 37.2 (30 Oct 2018 20:55)  T(F): 98.9 (31 Oct 2018 07:55), Max: 98.9 (30 Oct 2018 20:55)  HR: 65 (31 Oct 2018 07:55) (62 - 73)  BP: 150/66 (31 Oct 2018 07:55) (146/76 - 150/77)  BP(mean): --  RR: 14 (31 Oct 2018 07:55) (14 - 14)  SpO2: 96% (31 Oct 2018 07:55) (95% - 96%)    Medication Changes-  ALPRAZolam 0.25 milliGRAM(s) Oral at bedtime  diltiazem    milliGRAM(s) Oral <User Schedule>  escitalopram 10 milliGRAM(s) Oral daily  hydrALAZINE 50 milliGRAM(s) Oral every 8 hours  lisinopril 30 milliGRAM(s) Oral daily  metoprolol tartrate 25 milliGRAM(s) Oral two times a day      Studies:    Laboratory testing-                        12.4   13.2  )-----------( 395      ( 31 Oct 2018 05:45 )             38.1     10-31    137  |  104  |  26<H>  ----------------------------<  100<H>  4.5   |  22  |  1.01    Ca    8.7      31 Oct 2018 05:45    TPro  5.9<L>  /  Alb  2.4<L>  /  TBili  0.2  /  DBili  x   /  AST  11  /  ALT  12  /  AlkPhos  103  10-31    LIVER FUNCTIONS - ( 31 Oct 2018 05:45 )  Alb: 2.4 g/dL / Pro: 5.9 g/dL / ALK PHOS: 103 U/L / ALT: 12 U/L DA / AST: 11 U/L / GGT: x           Psychiatric Diagnosis: Anxiety due to General Medical condition.  Left pontine CVA.     Continue Lexapro 10 mg po daily, Xanax 0.25 mg po QHS & 0.125 mg po Q6H prn as needed for anxiousness. Psychiatry Consultation-Liaison Follow-up Note  68y  Encounter: 11/1/2018  Chart reviewed,  	  Met with-patient  Case Discussed with-nursing     68 year old Single Female who presents with anxiety post CVA.     Interval History:  Patient initially seen by C/L service on October 18th for anxiety in the context of CVA. Pt sitting up in wheelchair eager to be discharged "my house will need a good cleaning, I haven't  been there in 6 months."  Appears brighter, less anxious, denies feeling overwhelmed with discharge, rather looking forward to getting on with her life.  "I feel ready to go, I feel stronger."  Reports good appetite with good po intake.       Mental Status Examination:  General Appearance: Blonde female looks her stated age, good eye contact.   Remarkable Features: None  Psychomotor State: calm.   Abnormal movements and posture: see physiatry notes.   Social interaction and affect: Normal tone and volume, socially appropriate, calm.  Cognition, perceptual abnormalities: none   Consciousness: Alert   Orientation: to person and place, time.   Memory: Recent/Past/Remote: Grossly intact.   Attention: some deficits remain.   Insight/Judgment: Good     Medication Changes-  ALPRAZolam 0.25 milliGRAM(s) Oral at bedtime  diltiazem    milliGRAM(s) Oral <User Schedule>  escitalopram 10 milliGRAM(s) Oral daily  hydrALAZINE 50 milliGRAM(s) Oral every 8 hours  lisinopril 30 milliGRAM(s) Oral daily  metoprolol tartrate 25 milliGRAM(s) Oral two times a day      Studies:    Laboratory testing-                        12.4   13.2  )-----------( 395      ( 31 Oct 2018 05:45 )             38.1     10-31    137  |  104  |  26<H>  ----------------------------<  100<H>  4.5   |  22  |  1.01    Ca    8.7      31 Oct 2018 05:45    TPro  5.9<L>  /  Alb  2.4<L>  /  TBili  0.2  /  DBili  x   /  AST  11  /  ALT  12  /  AlkPhos  103  10-31    LIVER FUNCTIONS - ( 31 Oct 2018 05:45 )  Alb: 2.4 g/dL / Pro: 5.9 g/dL / ALK PHOS: 103 U/L / ALT: 12 U/L DA / AST: 11 U/L / GGT: x           Psychiatric Diagnosis: Anxiety due to General Medical condition.  Left pontine CVA.     Continue Lexapro 10 mg po daily, Xanax 0.25 mg po QHS & 0.125 mg po Q6H prn as needed for anxiousness. Psychiatry Consultation-Liaison Follow-up Note  68y  Encounter: 11/1/2018  Chart reviewed,  	  Met with-patient  Case Discussed with-nursing     68 year old Single Female who presents with anxiety post CVA.     Interval History:  Patient initially seen by C/L service on October 18th for anxiety in the context of CVA. Pt sitting up in wheelchair eager to be discharged "my house will need a good cleaning, I haven't  been there in 6 months."  Appears brighter, less anxious, denies feeling overwhelmed with discharge, rather looking forward to getting on with her life.  "I feel ready to go, I feel stronger."  Reports good appetite with good po intake.       Mental Status Examination:  General Appearance: Blonde female looks her stated age, good eye contact.   Remarkable Features: None  Psychomotor State: calm.   Abnormal movements and posture: see physiatry notes.   Social interaction and affect: Normal tone and volume, socially appropriate, calm.  Cognition, perceptual abnormalities: none   Consciousness: Alert   Orientation: to person and place, time.   Memory: Recent/Past/Remote: Grossly intact.   Attention: some deficits remain.   Insight/Judgment: Good     Medication Changes-  ALPRAZolam 0.25 milliGRAM(s) Oral at bedtime  diltiazem    milliGRAM(s) Oral <User Schedule>  escitalopram 10 milliGRAM(s) Oral daily  hydrALAZINE 50 milliGRAM(s) Oral every 8 hours  lisinopril 30 milliGRAM(s) Oral daily  metoprolol tartrate 25 milliGRAM(s) Oral two times a day      Studies:    Laboratory testing-                        12.4   13.2  )-----------( 395      ( 31 Oct 2018 05:45 )             38.1     10-31    137  |  104  |  26<H>  ----------------------------<  100<H>  4.5   |  22  |  1.01    Ca    8.7      31 Oct 2018 05:45    TPro  5.9<L>  /  Alb  2.4<L>  /  TBili  0.2  /  DBili  x   /  AST  11  /  ALT  12  /  AlkPhos  103  10-31    LIVER FUNCTIONS - ( 31 Oct 2018 05:45 )  Alb: 2.4 g/dL / Pro: 5.9 g/dL / ALK PHOS: 103 U/L / ALT: 12 U/L DA / AST: 11 U/L / GGT: x           Vital Signs Last 24 Hrs  T(C): 36.7 (01 Nov 2018 09:43), Max: 36.7 (01 Nov 2018 09:43)  T(F): 98 (01 Nov 2018 09:43), Max: 98 (01 Nov 2018 09:43)  HR: 63 (01 Nov 2018 09:43) (62 - 63)  BP: 126/61 (01 Nov 2018 09:43) (126/61 - 149/79)  BP(mean): --  RR: 14 (01 Nov 2018 09:43) (13 - 14)  SpO2: 95% (01 Nov 2018 09:43) (94% - 95%)    Psychiatric Diagnosis: Anxiety due to General Medical condition.  Left pontine CVA.     Continue Lexapro 10 mg po daily, Xanax 0.25 mg po QHS & 0.125 mg po Q6H prn as needed for anxiousness.

## 2018-11-02 VITALS
DIASTOLIC BLOOD PRESSURE: 73 MMHG | RESPIRATION RATE: 14 BRPM | TEMPERATURE: 98 F | OXYGEN SATURATION: 94 % | SYSTOLIC BLOOD PRESSURE: 138 MMHG | HEART RATE: 64 BPM

## 2018-11-02 LAB
ALBUMIN SERPL ELPH-MCNC: 2.5 G/DL — LOW (ref 3.3–5)
ALP SERPL-CCNC: 107 U/L — SIGNIFICANT CHANGE UP (ref 40–120)
ALT FLD-CCNC: 16 U/L DA — SIGNIFICANT CHANGE UP (ref 10–45)
ANION GAP SERPL CALC-SCNC: 10 MMOL/L — SIGNIFICANT CHANGE UP (ref 5–17)
AST SERPL-CCNC: 12 U/L — SIGNIFICANT CHANGE UP (ref 10–40)
BILIRUB SERPL-MCNC: 0.2 MG/DL — SIGNIFICANT CHANGE UP (ref 0.2–1.2)
BUN SERPL-MCNC: 32 MG/DL — HIGH (ref 7–23)
CALCIUM SERPL-MCNC: 8.8 MG/DL — SIGNIFICANT CHANGE UP (ref 8.4–10.5)
CHLORIDE SERPL-SCNC: 105 MMOL/L — SIGNIFICANT CHANGE UP (ref 96–108)
CO2 SERPL-SCNC: 23 MMOL/L — SIGNIFICANT CHANGE UP (ref 22–31)
CREAT SERPL-MCNC: 1.07 MG/DL — SIGNIFICANT CHANGE UP (ref 0.5–1.3)
GLUCOSE SERPL-MCNC: 101 MG/DL — HIGH (ref 70–99)
HCT VFR BLD CALC: 37.3 % — SIGNIFICANT CHANGE UP (ref 34.5–45)
HGB BLD-MCNC: 12.3 G/DL — SIGNIFICANT CHANGE UP (ref 11.5–15.5)
MCHC RBC-ENTMCNC: 29.4 PG — SIGNIFICANT CHANGE UP (ref 27–34)
MCHC RBC-ENTMCNC: 32.9 GM/DL — SIGNIFICANT CHANGE UP (ref 32–36)
MCV RBC AUTO: 89.4 FL — SIGNIFICANT CHANGE UP (ref 80–100)
PLATELET # BLD AUTO: 393 K/UL — SIGNIFICANT CHANGE UP (ref 150–400)
POTASSIUM SERPL-MCNC: 4.5 MMOL/L — SIGNIFICANT CHANGE UP (ref 3.5–5.3)
POTASSIUM SERPL-SCNC: 4.5 MMOL/L — SIGNIFICANT CHANGE UP (ref 3.5–5.3)
PROT SERPL-MCNC: 6.1 G/DL — SIGNIFICANT CHANGE UP (ref 6–8.3)
RBC # BLD: 4.17 M/UL — SIGNIFICANT CHANGE UP (ref 3.8–5.2)
RBC # FLD: 12.8 % — SIGNIFICANT CHANGE UP (ref 10.3–14.5)
SODIUM SERPL-SCNC: 138 MMOL/L — SIGNIFICANT CHANGE UP (ref 135–145)
WBC # BLD: 14.1 K/UL — HIGH (ref 3.8–10.5)
WBC # FLD AUTO: 14.1 K/UL — HIGH (ref 3.8–10.5)

## 2018-11-02 PROCEDURE — 99239 HOSP IP/OBS DSCHRG MGMT >30: CPT

## 2018-11-02 PROCEDURE — 99232 SBSQ HOSP IP/OBS MODERATE 35: CPT

## 2018-11-02 RX ORDER — ASPIRIN/CALCIUM CARB/MAGNESIUM 324 MG
1 TABLET ORAL
Qty: 0 | Refills: 0 | DISCHARGE
Start: 2018-11-02

## 2018-11-02 RX ADMIN — Medication 25 MILLIGRAM(S): at 05:54

## 2018-11-02 RX ADMIN — LISINOPRIL 30 MILLIGRAM(S): 2.5 TABLET ORAL at 05:54

## 2018-11-02 RX ADMIN — Medication 50 MILLIGRAM(S): at 05:53

## 2018-11-02 NOTE — PROGRESS NOTE ADULT - PROBLEM SELECTOR PROBLEM 5
Labile mood

## 2018-11-02 NOTE — PROGRESS NOTE ADULT - PROBLEM SELECTOR PROBLEM 2
Atrial fibrillation

## 2018-11-02 NOTE — PROGRESS NOTE ADULT - PROBLEM SELECTOR PROBLEM 6
Diarrhea

## 2018-11-02 NOTE — PROGRESS NOTE ADULT - PROBLEM SELECTOR PLAN 6
Hold all laxatives, monitor BMs, afebrile.
Resolved now. Hold all laxatives, monitor BMs, afebrile.

## 2018-11-02 NOTE — PROGRESS NOTE ADULT - SUBJECTIVE AND OBJECTIVE BOX
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 (01 Nov 2018 13:22)  Patient seen and examined at bedside.  pt. feels good today, " I am going home".    ALLERGIES:  No Known Allergies    MEDICATIONS  (STANDING):  ALPRAZolam 0.25 milliGRAM(s) Oral at bedtime  aspirin  chewable 81 milliGRAM(s) Oral daily  diltiazem    milliGRAM(s) Oral <User Schedule>  escitalopram 10 milliGRAM(s) Oral daily  hydrALAZINE 50 milliGRAM(s) Oral every 8 hours  lisinopril 30 milliGRAM(s) Oral daily  metoprolol tartrate 25 milliGRAM(s) Oral two times a day    MEDICATIONS  (PRN):    Vital Signs Last 24 Hrs  T(F): 98.3 (02 Nov 2018 05:52), Max: 98.8 (01 Nov 2018 21:08)  HR: 61 (02 Nov 2018 05:52) (61 - 71)  BP: 130/80 (02 Nov 2018 05:52) (126/61 - 136/75)  RR: 14 (02 Nov 2018 05:52) (14 - 14)  SpO2: 95% (02 Nov 2018 05:52) (95% - 95%)  I&O's Summary    01 Nov 2018 07:01  -  02 Nov 2018 07:00  --------------------------------------------------------  IN: 711 mL / OUT: 0 mL / NET: 711 mL      PHYSICAL EXAM:  General: NAD, A/O x 3  ENT: MMM  Neck: Supple, No JVD  Lungs: Clear to auscultation bilaterally  Cardio: RRR, S1/S2, No murmurs  Abdomen: Soft, Nontender, Nondistended; Bowel sounds present  Extremities: No calf tenderness, No pitting edema  Neuro:  left sided weakness, speech fluent    LABS:                        12.3   14.1  )-----------( 393      ( 02 Nov 2018 05:50 )             37.3     11-02    138  |  105  |  32  ----------------------------<  101  4.5   |  23  |  1.07    Ca    8.8      02 Nov 2018 05:50    TPro  6.1  /  Alb  2.5  /  TBili  0.2  /  DBili  x   /  AST  12  /  ALT  16  /  AlkPhos  107  11-02    eGFR if Non African American: 53 mL/min/1.73M2 (11-02-18 @ 05:50)  eGFR if : 62 mL/min/1.73M2 (11-02-18 @ 05:50)                    CAPILLARY BLOOD GLUCOSE                RADIOLOGY & ADDITIONAL TESTS:    Care Discussed with Consultants/Other Providers:

## 2018-11-02 NOTE — PROGRESS NOTE ADULT - ATTENDING COMMENTS
Good spirits, stable neurological exam, ready for discharge home.    Patient is being discharged home with home care,   Discharge instructions were discussed with patient and family ( brother), all current medications were sent to the pharmacy. Patient and family were educated on importance of medication compliance,  continued  care with PMD and follow-up care with the specialists in the community. Safety and fall risk precautions  were discussed in detail, counseled on healthy life style modifications.  All questions were answered to their satisfaction.

## 2018-11-02 NOTE — PROGRESS NOTE ADULT - PROBLEM SELECTOR PLAN 7
Afebrile. Awaiting medicine's input.
Afebrile, repeat am. Awaiting medicine's input.
Afebrile. Asymptomatic. Observe off antibiotics per medicine.
Afebrile. Awaiting medicine's input.
Persisting. Afebrile. Asymptomatic. Observe off antibiotics per medicine.
Persisting. Afebrile. Asymptomatic. Observe off antibiotics per medicine. Follow up PCP outpt for further workup.
Persisting. Afebrile. Asymptomatic. Observe off antibiotics per medicine. Follow up PCP outpt for further workup. Cleared for d/c home today c PCP follow up.
WBC 13.0 (10/19), Afebrile, WBC=12.3 today, no shift.  Awaiting medicine's input.
WBC 13.0 (10/19), Afebrile, repeat am. Awaiting medicine's input.

## 2018-11-02 NOTE — PROGRESS NOTE ADULT - PROVIDER SPECIALTY LIST ADULT
Hospitalist
Neurology
Psychiatry
Rehab Medicine
Psychiatry
Hospitalist

## 2018-11-02 NOTE — PROGRESS NOTE ADULT - PROBLEM SELECTOR PLAN 2
CTH repeated-stable. Cardizem for rate control. Monitor VS closely. No palpitations.
CTH repeated-stable. Cardizem for rate control. Monitor VS closely. No palpitations.
Needs repeat CTH for possible Eliquis start. Cardizem for rate control. Monitor VS closely. No palpitations, VSS.
Repeat CTH 10-14 days for possible Eliquis start. Cardizem for rate control. Monitor VS closely. No palpitations, VSS.
Repeat CTH 10-14 days for possible Eliquis start. Cardizem for rate control. Monitor VS closely. No palpitations, VSS.
Repeat CTH 10-14 days for possible Eliquis start. Hold for now pending clinical course. Cardizem for rate control. Monitor VS closely.

## 2018-11-02 NOTE — PROGRESS NOTE ADULT - PROBLEM SELECTOR PLAN 4
Ex smoker s/p VATS lobectomy.
Ex smoker s/p VATS lobectomy. Monitor VS.

## 2018-11-02 NOTE — PROGRESS NOTE ADULT - PROBLEM SELECTOR PROBLEM 7
Leukocytosis

## 2018-11-02 NOTE — PROGRESS NOTE ADULT - PROBLEM SELECTOR PLAN 1
Completed PT/OT/SLP program. Aspiration and fall precautions. CT head repeated 10/31. Stable evolution/improved. Start ASA per neuro plan and defer AC/Eliquis to PCP Dr Mancini as per Pt's request.
Continue PT/OT/SLP program.  Repeat CTH in 10-14days for possible AC start. Monitor BP closely.
Continue PT/OT/SLP program.  Repeat CTH in 10-14days for possible AC start. Monitor BP closely.
Continue PT/OT/SLP program. Aspiration and fall precautions. CT head repeated 10/31. Stable evolution/improved. Start ASA per neuro plan and defer AC/Eliquis to PCP Dr Mancini as per Pt's request.
Continue PT/OT/SLP program. Aspiration and fall precautions. Repeat CTH in 10-14 days for possible AC start. Monitor BP closely.
Continue PT/OT/SLP program. Aspiration and fall precautions. Repeat CTH in 10-14 days from admission for possible AC start. Monitor BP closely.
Start PT/OT/SLP program. Aspiration and fall precautions. Repeat CTH in 10-14days for possible AC start. Monitor BP closely.
Continue PT/OT/SLP program. Aspiration and fall precautions. Repeat CTH in 10-14days for possible AC start. Monitor BP closely.

## 2018-11-02 NOTE — PROGRESS NOTE ADULT - ASSESSMENT
68F with hx lung cancer s/p VATS/lobectomy (5/2018), essential HTN, a-fib, p/w acute pontine ICH    #Acute pontine ICH  -PT/OT    #Essential hypertension  -Hydralazine dose increased yesterday  -Monitor BP for next 24 hours  -If SBP consistently over 140s, would increase hydralazine to 75 q8h tomorrow  -Isolated SBP of 160 last night - patient was agitated, which likely increased the blood pressure (psych to follow-up)    #Paroxysmal A-fib  -Rate control, off A/C due to recent ICH    #RAINE  -Cr 0.78 last week  -Encourage PO intake  -Monitor BMP per routine    #Lung cancer   -outpatient oncology follow-up     #Depression/Anxiety  -SSRI, xanax, psych following    #DVT ppx: SCDs (recent ICH)    #Leukocytosis  -? myeloproliferative process, possible mild component of hemoconcentration, no signs of infection, monitor with routine CBCs, outpatient hematology follow-up
67 yo Female c hx of AF on Eliquis s/p pontine ICH now with hemiplegia and functional deficits.
67 yo Female c hx of AF on Eliquis s/p pontine ICH now with hemiplegia and functional deficits.      Problem/Plan - 1:  ·  Problem: CVA (cerebral vascular accident).  Plan: Started ASA per Neurology and defer AC/Eliquis to PCP, Dr Mancini as per Pt's request.     Problem/Plan - 2:  ·  Problem: Atrial fibrillation.  Plan:  Cardizem and Metoprolol, hold off AC for now until pt. sees PMD after D/C.	   Problem/Plan - 3:  ·  Problem: Hypertension.  Plan: Continue Hydralazine, Lisinopril.     Problem/Plan - 4:  ·  Problem: Lung cancer.  Plan: Ex smoker s/p VATS lobectomy.      Problem/Plan - 5:  ·  Problem: Labile mood.  Plan: Psychiatry and neuropsychology following. Anxiety/depression-- on Lexapro.     Problem/Plan - 6  ·  Problem: Leukocytosis improving..  Plan: Pt. is  Afebrile and Asymptomatic. Observe off antibiotics, f/u with PCP outpt for further workup if necessary.
68F with hx lung cancer s/p VATS/lobectomy (5/2018), essential HTN, a-fib, p/w acute pontine ICH    #Acute pontine ICH  -PT/OT    #Essential hypertension  -BP remains elevated above goal in the 140s-150s systolic, lisinopril was increased from 20mg to 30mg (Hospitalist has done this)      #Paroxysmal A-fib  -Rate control,  -Patient to have reimaging of head before deciding about anticoagulation    #RAINE  -resolved  -continue intermittent monitoring    #Lung cancer   -outpatient oncology follow-up     #Depression/Anxiety  -SSRI, xanax, psych following    #DVT ppx: SCDs (recent ICH)    #Leukocytosis  -? myeloproliferative process, possible mild component of hemoconcentration, no signs of infection, monitor with routine CBCs, outpatient hematology follow-up
68F with hx lung cancer s/p VATS/lobectomy (5/2018), essential HTN, a-fib, p/w acute pontine ICH    #Acute pontine ICH  -PT/OT    #Essential hypertension  -Increase hydralazine to 50 q8h with hold parameters  -c/w other medications as ordered    #Paroxysmal A-fib  -Rate control, off A/C due to recent ICH    #Lung cancer   -outpatient oncology follow-up     #Depression/Anxiety  -SSRI, xanax prn     #DVT ppx: SCDs (recent ICH)    #Leukocytosis  -chronic, ? myeloproliferative process, no signs of infection, monitor with routine CBCs
68F with hx lung cancer s/p VATS/lobectomy (5/2018), essential HTN, a-fib, p/w acute pontine ICH    #Acute pontine ICH  -PT/OT    #Essential hypertension  -Stable on current regimen    #Paroxysmal A-fib  -Rate control, off A/C due to recent ICH    #Lung cancer   -outpatient oncology follow-up     #Depression/Anxiety  -SSRI, xanax prn     #DVT ppx: SCDs (recent ICH)    #Leukocytosis  -chronic, ? myeloproliferative process, no signs of infection, monitor with routine CBCs
68F with hx lung cancer s/p VATS/lobectomy (5/2018), essential HTN, a-fib, p/w acute pontine ICH    #Acute pontine ICH  -PT/OT    #Essential hypertension  -Stable on current regimen    #Paroxysmal A-fib  -Rate control, off A/C due to recent ICH    #Lung cancer   -outpatient oncology follow-up     #Depression/Anxiety  -SSRI, xanax prn     #DVT ppx: SCDs (recent ICH)    #Leukocytosis  -chronic, ? myeloproliferative process, no signs of infection, monitor with routine CBCs
69 yo Female c hx of AF on Eliquis s/p pontine ICH now with hemiplegia and functional deficits.

## 2018-11-02 NOTE — PROGRESS NOTE ADULT - SUBJECTIVE AND OBJECTIVE BOX
CHIEF COMPLAINT: No events overnight, no new weakness, no pain. Afebrile. Denies cough or dysuria.       HISTORY OF PRESENT ILLNESS  67 yo female ex smoker w/hx of lung ca (5/18 VATs/lobectomy), HTN and Afib on Eliquis and ASA presents to [facility] with sudden onset left side weakness and dysarthria. CT head shows acute pontine ICH. CTA head neg stenosis R, Left ICA 50%. . Eliquis held. Hypertensive on admission, BP>200. No neurosurgical intervention. Seen by neurology. BP control. Resume ASA/Eliquis 10-14 days per Neuro plan. Stable for acute inpt rehab. (18 Oct 2018 10:46)      PAST MEDICAL & SURGICAL HISTORY:  Lung cancer  Atrial fibrillation  Endometriosis  Hypertension  Fibroid tumor  History of ovarian cyst        REVIEW OF SYMPTOMS  [X] Constitutional WNL     [X] Cardio WNL            [X] Resp WNL           [X] GI WNL                          [X]  WNL                   [X] Heme WNL              [X] Endo WNL                     [X] Skin WNL                 [X] MSK WNL                  [X] Cognitive WNL        [X] Psych WNL      VITALS  Vital Signs Last 24 Hrs  T(C): 36.7 (02 Nov 2018 08:27), Max: 37.1 (01 Nov 2018 21:08)  T(F): 98.1 (02 Nov 2018 08:27), Max: 98.8 (01 Nov 2018 21:08)  HR: 64 (02 Nov 2018 08:27) (61 - 71)  BP: 138/73 (02 Nov 2018 08:27) (126/61 - 138/73)  BP(mean): --  RR: 14 (02 Nov 2018 08:27) (14 - 14)  SpO2: 94% (02 Nov 2018 08:27) (94% - 95%)      PHYSICAL EXAM  Constitutional - NAD, Comfortable  HEENT - NCAT, EOMI  Neck - Supple, No limited ROM  Chest - CTA bilaterally, No wheeze, No rhonchi, No crackles  Cardiovascular - RRR, S1S2, No murmurs  Abdomen - BS+, Soft, NTND  Extremities - No C/C/E, No calf tenderness   Skin-no rash  Wounds-na      Neuro exam-no new deficits.     FUNCTIONAL PROGRESS  Gait - supervision  ADLs - supervision  Transfers -supervision  Functional transfer - supervision    RECENT LABS                        12.3   14.1  )-----------( 393      ( 02 Nov 2018 05:50 )             37.3     11-02    138  |  105  |  32<H>  ----------------------------<  101<H>  4.5   |  23  |  1.07    Ca    8.8      02 Nov 2018 05:50    TPro  6.1  /  Alb  2.5<L>  /  TBili  0.2  /  DBili  x   /  AST  12  /  ALT  16  /  AlkPhos  107  11-02      LIVER FUNCTIONS - ( 02 Nov 2018 05:50 )  Alb: 2.5 g/dL / Pro: 6.1 g/dL / ALK PHOS: 107 U/L / ALT: 16 U/L DA / AST: 12 U/L / GGT: x                           RADIOLOGY/OTHER RESULTS      CURRENT MEDICATIONS  MEDICATIONS  (STANDING):  ALPRAZolam 0.25 milliGRAM(s) Oral at bedtime  aspirin  chewable 81 milliGRAM(s) Oral daily  diltiazem    milliGRAM(s) Oral <User Schedule>  escitalopram 10 milliGRAM(s) Oral daily  hydrALAZINE 50 milliGRAM(s) Oral every 8 hours  lisinopril 30 milliGRAM(s) Oral daily  metoprolol tartrate 25 milliGRAM(s) Oral two times a day    MEDICATIONS  (PRN):      ASSESSMENT & PLAN    GI/Bowel Management -hold laxatives   Management - Toilet Q2  Skin - Turn Q2  Pain - Tylenol PRN  DVT PPX - TEDs, SCDs  Diet -reg c thins       Continue comprehensive acute rehab program consisting of 3hrs/day of OT/PT and SLP.

## 2018-11-02 NOTE — PROGRESS NOTE ADULT - PROBLEM SELECTOR PLAN 5
Psychiatry evaluation for labile mood and poor sleep. Lexapro/xanax started.
Psychiatry and neuropsychology following. Anxiety/depression. Lexapro/xanax nightly+prn.
Psychiatry and neuropsychology following. Anxiety/depression. Seen by neuropsychology and psychiatry today. Lexapro/xanax nightly+prn.
Psychiatry and neuropsychology following. Anxiety/depression. Seen by neuropsychology and psychiatry today. Lexapro/xanax nightly+prn.
Psychiatry and neuropsychology following. Anxiety/depression. Seen by neuropsychology and psychiatry. Lexapro/xanax nightly+prn.
Psychiatry and neuropsychology following. Anxiety/depression. Seen by neuropsychology and psychiatry. Lexapro/xanax nightly+prn.
Psychiatry and neuropsychology following. Still emotional. Lexapro increased/xanax nightly+prn.
Psychiatry evaluation for labile mood and poor sleep. lexapro/xanax started.
Psychiatry following. Still emotional. Lexapro/xanax started.

## 2018-11-02 NOTE — PROGRESS NOTE ADULT - PROBLEM SELECTOR PROBLEM 4
Lung cancer

## 2018-11-02 NOTE — PROGRESS NOTE ADULT - PROBLEM SELECTOR PROBLEM 3
Hypertension

## 2018-11-11 PROCEDURE — 93005 ELECTROCARDIOGRAM TRACING: CPT

## 2018-11-11 PROCEDURE — 97535 SELF CARE MNGMENT TRAINING: CPT

## 2018-11-11 PROCEDURE — 70553 MRI BRAIN STEM W/O & W/DYE: CPT

## 2018-11-11 PROCEDURE — 85027 COMPLETE CBC AUTOMATED: CPT

## 2018-11-11 PROCEDURE — 97110 THERAPEUTIC EXERCISES: CPT

## 2018-11-11 PROCEDURE — 84100 ASSAY OF PHOSPHORUS: CPT

## 2018-11-11 PROCEDURE — 70450 CT HEAD/BRAIN W/O DYE: CPT

## 2018-11-11 PROCEDURE — 97530 THERAPEUTIC ACTIVITIES: CPT

## 2018-11-11 PROCEDURE — 83735 ASSAY OF MAGNESIUM: CPT

## 2018-11-11 PROCEDURE — 80048 BASIC METABOLIC PNL TOTAL CA: CPT

## 2018-11-11 PROCEDURE — 70498 CT ANGIOGRAPHY NECK: CPT

## 2018-11-11 PROCEDURE — 85730 THROMBOPLASTIN TIME PARTIAL: CPT

## 2018-11-11 PROCEDURE — 94640 AIRWAY INHALATION TREATMENT: CPT

## 2018-11-11 PROCEDURE — 97116 GAIT TRAINING THERAPY: CPT

## 2018-11-11 PROCEDURE — 71045 X-RAY EXAM CHEST 1 VIEW: CPT

## 2018-11-11 PROCEDURE — 85610 PROTHROMBIN TIME: CPT

## 2018-11-11 PROCEDURE — 70496 CT ANGIOGRAPHY HEAD: CPT

## 2018-11-11 PROCEDURE — 84484 ASSAY OF TROPONIN QUANT: CPT

## 2018-11-11 PROCEDURE — 36415 COLL VENOUS BLD VENIPUNCTURE: CPT

## 2018-11-11 PROCEDURE — 82550 ASSAY OF CK (CPK): CPT

## 2018-11-11 PROCEDURE — 96375 TX/PRO/DX INJ NEW DRUG ADDON: CPT

## 2018-11-11 PROCEDURE — 97167 OT EVAL HIGH COMPLEX 60 MIN: CPT

## 2018-11-11 PROCEDURE — 92610 EVALUATE SWALLOWING FUNCTION: CPT

## 2018-11-11 PROCEDURE — 97163 PT EVAL HIGH COMPLEX 45 MIN: CPT

## 2018-11-11 PROCEDURE — 96374 THER/PROPH/DIAG INJ IV PUSH: CPT | Mod: XU

## 2018-11-11 PROCEDURE — 99291 CRITICAL CARE FIRST HOUR: CPT | Mod: 25

## 2018-11-15 PROCEDURE — 97110 THERAPEUTIC EXERCISES: CPT

## 2018-11-15 PROCEDURE — 97112 NEUROMUSCULAR REEDUCATION: CPT

## 2018-11-15 PROCEDURE — 70450 CT HEAD/BRAIN W/O DYE: CPT

## 2018-11-15 PROCEDURE — 97535 SELF CARE MNGMENT TRAINING: CPT

## 2018-11-15 PROCEDURE — 97116 GAIT TRAINING THERAPY: CPT

## 2018-11-15 PROCEDURE — 92526 ORAL FUNCTION THERAPY: CPT

## 2018-11-15 PROCEDURE — 92507 TX SP LANG VOICE COMM INDIV: CPT

## 2018-11-15 PROCEDURE — 97163 PT EVAL HIGH COMPLEX 45 MIN: CPT

## 2018-11-15 PROCEDURE — 85027 COMPLETE CBC AUTOMATED: CPT

## 2018-11-15 PROCEDURE — 80053 COMPREHEN METABOLIC PANEL: CPT

## 2018-11-15 PROCEDURE — 97530 THERAPEUTIC ACTIVITIES: CPT

## 2019-01-17 PROBLEM — I48.91 UNSPECIFIED ATRIAL FIBRILLATION: Chronic | Status: ACTIVE | Noted: 2018-10-18

## 2019-01-17 PROBLEM — C34.90 MALIGNANT NEOPLASM OF UNSPECIFIED PART OF UNSPECIFIED BRONCHUS OR LUNG: Chronic | Status: ACTIVE | Noted: 2018-10-18

## 2019-01-28 ENCOUNTER — APPOINTMENT (OUTPATIENT)
Age: 69
End: 2019-01-28
Payer: MEDICARE

## 2019-01-28 VITALS
OXYGEN SATURATION: 95 % | HEIGHT: 63 IN | HEART RATE: 66 BPM | BODY MASS INDEX: 30.12 KG/M2 | SYSTOLIC BLOOD PRESSURE: 128 MMHG | RESPIRATION RATE: 16 BRPM | DIASTOLIC BLOOD PRESSURE: 88 MMHG | WEIGHT: 170 LBS

## 2019-01-28 PROCEDURE — 99214 OFFICE O/P EST MOD 30 MIN: CPT

## 2019-01-28 NOTE — PHYSICAL EXAM
[Neck Appearance] : the appearance of the neck was normal [Neck Cervical Mass (___cm)] : no neck mass was observed [Jugular Venous Distention Increased] : there was no jugular-venous distention [Thyroid Diffuse Enlargement] : the thyroid was not enlarged [Thyroid Nodule] : there were no palpable thyroid nodules [Auscultation Breath Sounds / Voice Sounds] : lungs were clear to auscultation bilaterally [Heart Rate And Rhythm] : heart rate was normal and rhythm regular [Heart Sounds] : normal S1 and S2 [Heart Sounds Gallop] : no gallops [Murmurs] : no murmurs [Heart Sounds Pericardial Friction Rub] : no pericardial rub [Examination Of The Chest] : the chest was normal in appearance [Chest Visual Inspection Thoracic Asymmetry] : no chest asymmetry [Diminished Respiratory Excursion] : normal chest expansion [Bowel Sounds] : normal bowel sounds [Abdomen Soft] : soft [Abdomen Tenderness] : non-tender [Abdomen Mass (___ Cm)] : no abdominal mass palpated [Cervical Lymph Nodes Enlarged Posterior Bilaterally] : posterior cervical [Cervical Lymph Nodes Enlarged Anterior Bilaterally] : anterior cervical [No CVA Tenderness] : no ~M costovertebral angle tenderness [No Spinal Tenderness] : no spinal tenderness [Nail Clubbing] : no clubbing  or cyanosis of the fingernails [Motor Tone] : muscle strength and tone were normal [] : no rash [Skin Lesions] : no skin lesions [FreeTextEntry1] : Mild right-sided weakness [Oriented To Time, Place, And Person] : oriented to person, place, and time [Impaired Insight] : insight and judgment were intact [Affect] : the affect was normal

## 2019-01-28 NOTE — ASSESSMENT
[FreeTextEntry1] : Kendra is a 68-year-old female with a history of lung cancer resected in the past who is due for a surveillance CT. This will be ordered today I will be contacting her after those results are available. She will need to continue surveillance going forward with an additional skin in July marking the one year postoperative point.\par \par Thank you for allowing me to participate in the care of your patient.\par \par Adam Lopez MD\[facility] Department of Cardiovascular and Thoracic Surgery\par \par Cristopher and Radha Rascon\[facility]\par

## 2019-01-28 NOTE — HISTORY OF PRESENT ILLNESS
[FreeTextEntry1] : \jewell Gardner was in the office today for a followup visit. She has had a stroke since her previous visit and her follow up CAT scan has been delayed. She is here now lower imaging has not been yet performed. She has some mild deficits but has recovered well. She has no new complaints.  Her last imaging study demonstrated no evidence of recurrent disease.

## 2019-01-28 NOTE — CONSULT LETTER
[Dear  ___] : Dear  [unfilled], [Adam Lopez MD] : Adam Lopez MD [Department of Cardiovascular and Thoracic Surgery] : Department of Cardiovascular and Thoracic Surgery [] :  [[facility]] : [facility]

## 2019-02-04 ENCOUNTER — APPOINTMENT (OUTPATIENT)
Dept: CT IMAGING | Facility: CLINIC | Age: 69
End: 2019-02-04
Payer: MEDICARE

## 2019-02-04 ENCOUNTER — OUTPATIENT (OUTPATIENT)
Dept: OUTPATIENT SERVICES | Facility: HOSPITAL | Age: 69
LOS: 1 days | End: 2019-02-04
Payer: MEDICARE

## 2019-02-04 DIAGNOSIS — Z87.42 PERSONAL HISTORY OF OTHER DISEASES OF THE FEMALE GENITAL TRACT: Chronic | ICD-10-CM

## 2019-02-04 DIAGNOSIS — D25.9 LEIOMYOMA OF UTERUS, UNSPECIFIED: Chronic | ICD-10-CM

## 2019-02-04 DIAGNOSIS — Z00.8 ENCOUNTER FOR OTHER GENERAL EXAMINATION: ICD-10-CM

## 2019-02-04 PROCEDURE — 71250 CT THORAX DX C-: CPT | Mod: 26

## 2019-02-04 PROCEDURE — 71250 CT THORAX DX C-: CPT

## 2019-02-19 ENCOUNTER — APPOINTMENT (OUTPATIENT)
Age: 69
End: 2019-02-19
Payer: MEDICARE

## 2019-02-19 VITALS
SYSTOLIC BLOOD PRESSURE: 157 MMHG | BODY MASS INDEX: 29.59 KG/M2 | WEIGHT: 167 LBS | OXYGEN SATURATION: 98 % | RESPIRATION RATE: 16 BRPM | HEART RATE: 65 BPM | DIASTOLIC BLOOD PRESSURE: 74 MMHG | HEIGHT: 63 IN

## 2019-02-19 PROCEDURE — 99214 OFFICE O/P EST MOD 30 MIN: CPT

## 2019-02-19 RX ORDER — ROSUVASTATIN CALCIUM 5 MG/1
5 TABLET, FILM COATED ORAL DAILY
Refills: 0 | Status: ACTIVE | COMMUNITY

## 2019-02-19 RX ORDER — VALSARTAN 160 MG/1
160 TABLET, COATED ORAL
Qty: 30 | Refills: 0 | Status: DISCONTINUED | COMMUNITY
Start: 2018-05-08 | End: 2019-02-19

## 2019-02-19 RX ORDER — METOPROLOL TARTRATE 50 MG/1
50 TABLET, FILM COATED ORAL EVERY MORNING
Refills: 0 | Status: ACTIVE | COMMUNITY
Start: 2018-04-20

## 2019-02-19 RX ORDER — DILTIAZEM HYDROCHLORIDE 240 MG/1
240 CAPSULE, COATED, EXTENDED RELEASE ORAL
Refills: 0 | Status: ACTIVE | COMMUNITY

## 2019-02-19 RX ORDER — AMLODIPINE BESYLATE 5 MG/1
5 TABLET ORAL
Qty: 90 | Refills: 0 | Status: DISCONTINUED | COMMUNITY
Start: 2018-04-16 | End: 2019-02-19

## 2019-02-19 RX ORDER — HYDRALAZINE HYDROCHLORIDE 50 MG/1
50 TABLET ORAL EVERY 8 HOURS
Refills: 0 | Status: ACTIVE | COMMUNITY

## 2019-02-19 RX ORDER — PREDNISONE 10 MG/1
10 TABLET ORAL
Qty: 30 | Refills: 6 | Status: DISCONTINUED | COMMUNITY
Start: 2018-05-01 | End: 2019-02-19

## 2019-02-19 RX ORDER — METOPROLOL TARTRATE 25 MG/1
25 TABLET, FILM COATED ORAL AT BEDTIME
Refills: 0 | Status: ACTIVE | COMMUNITY

## 2019-02-19 RX ORDER — AMLODIPINE BESYLATE 2.5 MG/1
2.5 TABLET ORAL
Refills: 0 | Status: DISCONTINUED | COMMUNITY
End: 2019-02-19

## 2019-02-19 NOTE — CONSULT LETTER
[Dear  ___] : Dear  [unfilled], [Adam Lopez MD] : Adam Lopez MD [Department of Cardiovascular and Thoracic Surgery] : Department of Cardiovascular and Thoracic Surgery [] :  [[facility]] : [facility]

## 2019-02-19 NOTE — HISTORY OF PRESENT ILLNESS
[FreeTextEntry1] : \jewell Gardner was in the office today for a followup visit. She recently underwent a CT scan that demonstrated multifocal groundglass opacities with a small solid component in the left upper lobe. This is somewhat concerning for a multifocal PONCE. She otherwise has no new complaints.

## 2019-02-19 NOTE — ASSESSMENT
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 I've asked her to follow up with pulmonary medicine for repeat function testing and evaluation prior to further recommendations but I suspect biopsy would be appropriate particularly in the left upper lobe. Based on the small size this would require thoracoscopy and lung resection. Further recommendations will follow.\par \par Thank you for allowing me to participate in the care of your patient.\par \par Adam Lopez MD\[facility] Department of Cardiovascular and Thoracic Surgery\par \par Cristopher and Radha Rascon\[facility] at \[facility]\""/[facility]\par

## 2019-03-15 ENCOUNTER — APPOINTMENT (OUTPATIENT)
Dept: PULMONOLOGY | Facility: CLINIC | Age: 69
End: 2019-03-15
Payer: MEDICARE

## 2019-03-15 VITALS — BODY MASS INDEX: 30.48 KG/M2 | WEIGHT: 172 LBS | HEIGHT: 63 IN

## 2019-03-15 VITALS
SYSTOLIC BLOOD PRESSURE: 118 MMHG | OXYGEN SATURATION: 98 % | HEART RATE: 87 BPM | RESPIRATION RATE: 16 BRPM | DIASTOLIC BLOOD PRESSURE: 80 MMHG

## 2019-03-15 DIAGNOSIS — Z01.811 ENCOUNTER FOR PREPROCEDURAL RESPIRATORY EXAMINATION: ICD-10-CM

## 2019-03-15 DIAGNOSIS — Z00.00 ENCOUNTER FOR GENERAL ADULT MEDICAL EXAMINATION W/OUT ABNORMAL FINDINGS: ICD-10-CM

## 2019-03-15 PROCEDURE — 94664 DEMO&/EVAL PT USE INHALER: CPT | Mod: 59

## 2019-03-15 PROCEDURE — 85018 HEMOGLOBIN: CPT | Mod: QW

## 2019-03-15 PROCEDURE — 94729 DIFFUSING CAPACITY: CPT

## 2019-03-15 PROCEDURE — 94727 GAS DIL/WSHOT DETER LNG VOL: CPT

## 2019-03-15 PROCEDURE — 94060 EVALUATION OF WHEEZING: CPT

## 2019-03-15 PROCEDURE — 99215 OFFICE O/P EST HI 40 MIN: CPT | Mod: 25

## 2019-03-15 RX ORDER — LISINOPRIL 40 MG/1
40 TABLET ORAL
Refills: 0 | Status: ACTIVE | COMMUNITY

## 2019-03-15 RX ORDER — VARENICLINE TARTRATE 1 MG/1
1 TABLET, FILM COATED ORAL
Qty: 60 | Refills: 0 | Status: DISCONTINUED | COMMUNITY
Start: 2017-11-21 | End: 2019-03-15

## 2019-03-15 RX ORDER — ESCITALOPRAM OXALATE 10 MG/1
10 TABLET ORAL
Refills: 0 | Status: ACTIVE | COMMUNITY

## 2019-03-15 NOTE — REVIEW OF SYSTEMS
[Cough] : no cough [Dyspnea] : dyspnea [Wheezing] : no wheezing [Hypertension] : ~T hypertension [As Noted in HPI] : as noted in HPI [Negative] : Sleep Disorder

## 2019-03-15 NOTE — PHYSICAL EXAM
[General Appearance - Well Developed] : well developed [Normal Appearance] : normal appearance [Well Groomed] : well groomed [General Appearance - Well Nourished] : well nourished [No Deformities] : no deformities [General Appearance - In No Acute Distress] : no acute distress [Normal Conjunctiva] : the conjunctiva exhibited no abnormalities [Eyelids - No Xanthelasma] : the eyelids demonstrated no xanthelasmas [Normal Oropharynx] : normal oropharynx [Neck Appearance] : the appearance of the neck was normal [Neck Cervical Mass (___cm)] : no neck mass was observed [Jugular Venous Distention Increased] : there was no jugular-venous distention [Thyroid Diffuse Enlargement] : the thyroid was not enlarged [Thyroid Nodule] : there were no palpable thyroid nodules [Heart Rate And Rhythm] : heart rate and rhythm were normal [Heart Sounds] : normal S1 and S2 [Murmurs] : no murmurs present [Respiration, Rhythm And Depth] : normal respiratory rhythm and effort [Exaggerated Use Of Accessory Muscles For Inspiration] : no accessory muscle use [Auscultation Breath Sounds / Voice Sounds] : lungs were clear to auscultation bilaterally [Abdomen Soft] : soft [Abdomen Tenderness] : non-tender [Abdomen Mass (___ Cm)] : no abdominal mass palpated [Abnormal Walk] : normal gait [Gait - Sufficient For Exercise Testing] : the gait was sufficient for exercise testing [Nail Clubbing] : no clubbing of the fingernails [Cyanosis, Localized] : no localized cyanosis [Petechial Hemorrhages (___cm)] : no petechial hemorrhages [] : no ischemic changes [Skin Turgor] : normal skin turgor [No Focal Deficits] : no focal deficits [Oriented To Time, Place, And Person] : oriented to person, place, and time [Impaired Insight] : insight and judgment were intact [Affect] : the affect was normal

## 2019-03-15 NOTE — REASON FOR VISIT
[Preoperative Evaluation] : an preoperative evaluation [Abnormal CT Scan] : abnormal CT Scan [COPD] : COPD [Lung Cancer] : lung cancer

## 2019-05-08 ENCOUNTER — APPOINTMENT (OUTPATIENT)
Dept: PULMONOLOGY | Facility: CLINIC | Age: 69
End: 2019-05-08
Payer: MEDICARE

## 2019-05-08 VITALS — BODY MASS INDEX: 31.89 KG/M2 | WEIGHT: 180 LBS

## 2019-05-08 VITALS — OXYGEN SATURATION: 95 % | HEART RATE: 61 BPM

## 2019-05-08 VITALS — DIASTOLIC BLOOD PRESSURE: 80 MMHG | SYSTOLIC BLOOD PRESSURE: 134 MMHG

## 2019-05-08 PROCEDURE — 94010 BREATHING CAPACITY TEST: CPT

## 2019-05-08 PROCEDURE — 99214 OFFICE O/P EST MOD 30 MIN: CPT | Mod: 25

## 2019-05-08 NOTE — REASON FOR VISIT
[Lung Cancer] : lung cancer [Abnormal CT Scan] : abnormal CT Scan [Shortness of Breath] : shortness of Breath [COPD] : COPD

## 2019-05-08 NOTE — PHYSICAL EXAM
[General Appearance - Well Developed] : well developed [Normal Appearance] : normal appearance [Well Groomed] : well groomed [General Appearance - Well Nourished] : well nourished [No Deformities] : no deformities [General Appearance - In No Acute Distress] : no acute distress [Eyelids - No Xanthelasma] : the eyelids demonstrated no xanthelasmas [Normal Conjunctiva] : the conjunctiva exhibited no abnormalities [Normal Oropharynx] : normal oropharynx [Neck Cervical Mass (___cm)] : no neck mass was observed [Neck Appearance] : the appearance of the neck was normal [Jugular Venous Distention Increased] : there was no jugular-venous distention [Thyroid Nodule] : there were no palpable thyroid nodules [Thyroid Diffuse Enlargement] : the thyroid was not enlarged [Heart Rate And Rhythm] : heart rate and rhythm were normal [Heart Sounds] : normal S1 and S2 [Murmurs] : no murmurs present [Respiration, Rhythm And Depth] : normal respiratory rhythm and effort [Exaggerated Use Of Accessory Muscles For Inspiration] : no accessory muscle use [Abdomen Soft] : soft [Auscultation Breath Sounds / Voice Sounds] : lungs were clear to auscultation bilaterally [Abdomen Mass (___ Cm)] : no abdominal mass palpated [Abdomen Tenderness] : non-tender [Abnormal Walk] : normal gait [Gait - Sufficient For Exercise Testing] : the gait was sufficient for exercise testing [Nail Clubbing] : no clubbing of the fingernails [Cyanosis, Localized] : no localized cyanosis [Petechial Hemorrhages (___cm)] : no petechial hemorrhages [] : no ischemic changes [Skin Turgor] : normal skin turgor [No Focal Deficits] : no focal deficits [Oriented To Time, Place, And Person] : oriented to person, place, and time [Impaired Insight] : insight and judgment were intact [Affect] : the affect was normal

## 2019-05-08 NOTE — REVIEW OF SYSTEMS
[Cough] : no cough [Wheezing] : no wheezing [Hypertension] : ~T hypertension [Dyspnea] : dyspnea [As Noted in HPI] : as noted in HPI [Negative] : Sleep Disorder

## 2019-07-15 ENCOUNTER — FORM ENCOUNTER (OUTPATIENT)
Age: 69
End: 2019-07-15

## 2019-07-16 ENCOUNTER — OUTPATIENT (OUTPATIENT)
Dept: OUTPATIENT SERVICES | Facility: HOSPITAL | Age: 69
LOS: 1 days | End: 2019-07-16
Payer: MEDICARE

## 2019-07-16 ENCOUNTER — APPOINTMENT (OUTPATIENT)
Dept: ULTRASOUND IMAGING | Facility: CLINIC | Age: 69
End: 2019-07-16
Payer: MEDICARE

## 2019-07-16 ENCOUNTER — APPOINTMENT (OUTPATIENT)
Dept: CT IMAGING | Facility: CLINIC | Age: 69
End: 2019-07-16
Payer: MEDICARE

## 2019-07-16 DIAGNOSIS — C43.71 MALIGNANT MELANOMA OF RIGHT LOWER LIMB, INCLUDING HIP: ICD-10-CM

## 2019-07-16 DIAGNOSIS — Z00.00 ENCOUNTER FOR GENERAL ADULT MEDICAL EXAMINATION WITHOUT ABNORMAL FINDINGS: ICD-10-CM

## 2019-07-16 DIAGNOSIS — D25.9 LEIOMYOMA OF UTERUS, UNSPECIFIED: Chronic | ICD-10-CM

## 2019-07-16 DIAGNOSIS — Z87.42 PERSONAL HISTORY OF OTHER DISEASES OF THE FEMALE GENITAL TRACT: Chronic | ICD-10-CM

## 2019-07-16 PROCEDURE — 71250 CT THORAX DX C-: CPT | Mod: 26

## 2019-07-16 PROCEDURE — 71250 CT THORAX DX C-: CPT

## 2019-08-14 NOTE — CHART NOTE - NSCHARTNOTEFT_GEN_A_CORE
CHART EVENT NOTE:    Repeat CTH was completed 10/12/18. I notified & reviewed repeat CTH images with Dr. Aaron Tijerina.     I called [facility] 10/12/18 and s/w Dr. Phillips to confirm CTH was stable. He reports it was "stable/unchanged".   I also called [facility] again and s/w Dr. Ocampo on 10/13/18 to review repeat CTH images, and he reports it is "stable/unchanged".     < from: CT Head No Cont (10.12.18 @ 21:10) >    ******PRELIMINARY REPORT******          EXAM:  CT BRAIN                          PROCEDURE DATE:  10/12/2018      ******PRELIMINARY REPORT******           INTERPRETATION:  [facility] RADIOLOGIST PRELIMINARY ADDENDUM REPORT    Addendum created by David Smith MD on 10/12/2018 10:30:16 PM EDT     Clinicians are aware of pontine hemorrhage. The current study   demonstrates that   on equivalent axial images, there is evidence forslight increase in size   of   the pontine hemorrhage now measuring up to 2.2 cm in transverse dimension   compared to a prior measurement of 1.7 cm at the same level. Short-term   followup is recommended.  Addendum created by David Smith MD on 10/12/2018 9:52:07 PM EDT     I have requested emergency consultation referring clinician regarding   this area   of abnormal high attenuation in the brainstem.  Initial report created on 10/12/2018 9:47:51 PM EDT     EXAM:    CT Head Without Intravenous Contrast     EXAM DATE/TIME:    10/12/2018 8:57 PM     CLINICAL HISTORY:    68 years old, female; Condition or disease; Other: Ich     TECHNIQUE:    Axial computed tomography images of the head/brain without intravenous   contrast.    All CT scansat this facility use at least one of these dose   optimization   techniques: automated exposure control; mA and/or kV adjustment per   patient   size (includes targeted exams where dose is matched to clinical   indication); or   iterative reconstruction.    Coronal and sagittal reformatted images were created and reviewed.     COMPARISON:    No relevant prior studies available.     FINDINGS:    Brain:  Area of high attenuation noted in the brainstem/richard as on prior   study. It is noted that thispatient had a recent CTA of the brain and   high   attenuation may represent hemorrhage and/or parenchymal staining due to   area of   abnormality. I have requested results from recent CTA of neck and brain   embolization addendum following review. Likely underlying microvascular   ischemic disease.    Ventricles: Normal. No ventriculomegaly.    Bones/joints:  The calvarium is intact.    Sinuses:  No air fluid levels sinuses.    Mastoid air cells:  The mastoid air cells are clear.    Orbits:  Visualized orbits appear nonacute.    Soft tissues: Normal.     IMPRESSION:   1. Area of high attenuation noted in the brainstem/richard as on prior study   earlier this evening. It is noted that this patient had a recent CTA of   the   brain and high attenuation may represent hemorrhage and/or parenchymal   staining   due to underlying area of abnormality-ischemic area/mass/underlying   vascular   malformation. I have requested results from recent CTA of neck and brain   embolization addendum followingreview.   2. Likely underlying microvascular ischemic disease.     ******PRELIMINARY REPORT******          DAVID SMITH M.D.;[facility]      < end of copied text > Pt states that she has had vaginal burning, itching and slight discharge that is off white in color x 4 days. Pt feels as though she has a yeast infection and has been using Monistat 7 without relief. Pt scheduled  For appointment today. Voiced understanding.

## 2019-08-18 NOTE — DISCHARGE NOTE ADULT - CARE PROVIDERS DIRECT ADDRESSES
no deformity, pain or tenderness, no restriction of movement
,[email or web address]

## 2019-08-21 ENCOUNTER — APPOINTMENT (OUTPATIENT)
Dept: PULMONOLOGY | Facility: CLINIC | Age: 69
End: 2019-08-21
Payer: MEDICARE

## 2019-08-21 VITALS — HEART RATE: 68 BPM | DIASTOLIC BLOOD PRESSURE: 70 MMHG | OXYGEN SATURATION: 96 % | SYSTOLIC BLOOD PRESSURE: 122 MMHG

## 2019-08-21 VITALS — BODY MASS INDEX: 32.77 KG/M2 | WEIGHT: 185 LBS

## 2019-08-21 DIAGNOSIS — Z85.118 PERSONAL HISTORY OF OTHER MALIGNANT NEOPLASM OF BRONCHUS AND LUNG: ICD-10-CM

## 2019-08-21 DIAGNOSIS — Z87.891 PERSONAL HISTORY OF NICOTINE DEPENDENCE: ICD-10-CM

## 2019-08-21 PROCEDURE — 94060 EVALUATION OF WHEEZING: CPT

## 2019-08-21 PROCEDURE — 99214 OFFICE O/P EST MOD 30 MIN: CPT | Mod: 25

## 2019-08-21 PROCEDURE — 94727 GAS DIL/WSHOT DETER LNG VOL: CPT

## 2019-08-21 PROCEDURE — 94664 DEMO&/EVAL PT USE INHALER: CPT | Mod: 59

## 2019-08-21 PROCEDURE — 94729 DIFFUSING CAPACITY: CPT

## 2019-08-21 PROCEDURE — 85018 HEMOGLOBIN: CPT | Mod: QW

## 2019-08-21 NOTE — REASON FOR VISIT
[Follow-Up] : a follow-up visit [Abnormal CT Scan] : abnormal CT Scan [COPD] : COPD [Lung Cancer] : lung cancer

## 2019-08-21 NOTE — PHYSICAL EXAM
[General Appearance - Well Developed] : well developed [Well Groomed] : well groomed [Normal Appearance] : normal appearance [General Appearance - Well Nourished] : well nourished [No Deformities] : no deformities [General Appearance - In No Acute Distress] : no acute distress [Normal Conjunctiva] : the conjunctiva exhibited no abnormalities [Eyelids - No Xanthelasma] : the eyelids demonstrated no xanthelasmas [Normal Oropharynx] : normal oropharynx [Neck Appearance] : the appearance of the neck was normal [Neck Cervical Mass (___cm)] : no neck mass was observed [Jugular Venous Distention Increased] : there was no jugular-venous distention [Thyroid Diffuse Enlargement] : the thyroid was not enlarged [Thyroid Nodule] : there were no palpable thyroid nodules [Heart Rate And Rhythm] : heart rate and rhythm were normal [Heart Sounds] : normal S1 and S2 [Murmurs] : no murmurs present [Respiration, Rhythm And Depth] : normal respiratory rhythm and effort [Exaggerated Use Of Accessory Muscles For Inspiration] : no accessory muscle use [Auscultation Breath Sounds / Voice Sounds] : lungs were clear to auscultation bilaterally [Decreased Breath Sounds] : decreased breath sounds [Abdomen Tenderness] : non-tender [Abdomen Soft] : soft [Abdomen Mass (___ Cm)] : no abdominal mass palpated [Abnormal Walk] : normal gait [Gait - Sufficient For Exercise Testing] : the gait was sufficient for exercise testing [Cyanosis, Localized] : no localized cyanosis [Nail Clubbing] : no clubbing of the fingernails [Petechial Hemorrhages (___cm)] : no petechial hemorrhages [] : no ischemic changes [Skin Turgor] : normal skin turgor [Oriented To Time, Place, And Person] : oriented to person, place, and time [No Focal Deficits] : no focal deficits [Impaired Insight] : insight and judgment were intact [Affect] : the affect was normal

## 2019-11-01 ENCOUNTER — APPOINTMENT (OUTPATIENT)
Dept: PULMONOLOGY | Facility: CLINIC | Age: 69
End: 2019-11-01
Payer: MEDICARE

## 2019-11-01 VITALS — HEIGHT: 62.25 IN | BODY MASS INDEX: 33.98 KG/M2 | HEART RATE: 61 BPM | WEIGHT: 187 LBS | OXYGEN SATURATION: 98 %

## 2019-11-01 VITALS — BODY MASS INDEX: 44.66 KG/M2 | HEIGHT: 55 IN | WEIGHT: 193 LBS

## 2019-11-01 PROCEDURE — 94010 BREATHING CAPACITY TEST: CPT

## 2019-11-01 PROCEDURE — 85018 HEMOGLOBIN: CPT | Mod: QW

## 2019-11-01 PROCEDURE — 94729 DIFFUSING CAPACITY: CPT

## 2019-11-01 PROCEDURE — 94727 GAS DIL/WSHOT DETER LNG VOL: CPT

## 2019-11-01 PROCEDURE — 99214 OFFICE O/P EST MOD 30 MIN: CPT | Mod: 25

## 2019-11-01 NOTE — PHYSICAL EXAM
[General Appearance - Well Developed] : well developed [Normal Appearance] : normal appearance [Well Groomed] : well groomed [General Appearance - Well Nourished] : well nourished [No Deformities] : no deformities [General Appearance - In No Acute Distress] : no acute distress [Normal Conjunctiva] : the conjunctiva exhibited no abnormalities [Eyelids - No Xanthelasma] : the eyelids demonstrated no xanthelasmas [Normal Oropharynx] : normal oropharynx [Neck Appearance] : the appearance of the neck was normal [Neck Cervical Mass (___cm)] : no neck mass was observed [Jugular Venous Distention Increased] : there was no jugular-venous distention [Thyroid Diffuse Enlargement] : the thyroid was not enlarged [Thyroid Nodule] : there were no palpable thyroid nodules [Heart Rate And Rhythm] : heart rate and rhythm were normal [Heart Sounds] : normal S1 and S2 [Murmurs] : no murmurs present [Respiration, Rhythm And Depth] : normal respiratory rhythm and effort [Exaggerated Use Of Accessory Muscles For Inspiration] : no accessory muscle use [Auscultation Breath Sounds / Voice Sounds] : lungs were clear to auscultation bilaterally [Decreased Breath Sounds] : decreased breath sounds [Abdomen Soft] : soft [Abdomen Tenderness] : non-tender [Abdomen Mass (___ Cm)] : no abdominal mass palpated [Abnormal Walk] : normal gait [Gait - Sufficient For Exercise Testing] : the gait was sufficient for exercise testing [Nail Clubbing] : no clubbing of the fingernails [Cyanosis, Localized] : no localized cyanosis [Petechial Hemorrhages (___cm)] : no petechial hemorrhages [] : no ischemic changes [Skin Turgor] : normal skin turgor [No Focal Deficits] : no focal deficits [Oriented To Time, Place, And Person] : oriented to person, place, and time [Impaired Insight] : insight and judgment were intact [Affect] : the affect was normal

## 2019-11-01 NOTE — REVIEW OF SYSTEMS
[Dyspnea] : dyspnea [Hypertension] : ~T hypertension [As Noted in HPI] : as noted in HPI [Negative] : Sleep Disorder [Cough] : no cough [Wheezing] : no wheezing

## 2019-11-25 ENCOUNTER — APPOINTMENT (OUTPATIENT)
Dept: PULMONOLOGY | Facility: CLINIC | Age: 69
End: 2019-11-25

## 2019-12-09 ENCOUNTER — FORM ENCOUNTER (OUTPATIENT)
Age: 69
End: 2019-12-09

## 2019-12-10 ENCOUNTER — APPOINTMENT (OUTPATIENT)
Dept: CT IMAGING | Facility: CLINIC | Age: 69
End: 2019-12-10
Payer: MEDICARE

## 2019-12-10 ENCOUNTER — OUTPATIENT (OUTPATIENT)
Dept: OUTPATIENT SERVICES | Facility: HOSPITAL | Age: 69
LOS: 1 days | End: 2019-12-10
Payer: MEDICARE

## 2019-12-10 DIAGNOSIS — Z87.42 PERSONAL HISTORY OF OTHER DISEASES OF THE FEMALE GENITAL TRACT: Chronic | ICD-10-CM

## 2019-12-10 DIAGNOSIS — D25.9 LEIOMYOMA OF UTERUS, UNSPECIFIED: Chronic | ICD-10-CM

## 2019-12-10 DIAGNOSIS — R91.1 SOLITARY PULMONARY NODULE: ICD-10-CM

## 2019-12-10 PROCEDURE — 71250 CT THORAX DX C-: CPT

## 2019-12-10 PROCEDURE — 71250 CT THORAX DX C-: CPT | Mod: 26

## 2019-12-16 ENCOUNTER — RX RENEWAL (OUTPATIENT)
Age: 69
End: 2019-12-16

## 2020-02-06 ENCOUNTER — APPOINTMENT (OUTPATIENT)
Dept: PULMONOLOGY | Facility: CLINIC | Age: 70
End: 2020-02-06
Payer: MEDICARE

## 2020-02-06 VITALS — SYSTOLIC BLOOD PRESSURE: 128 MMHG | OXYGEN SATURATION: 95 % | HEART RATE: 60 BPM | DIASTOLIC BLOOD PRESSURE: 80 MMHG

## 2020-02-06 VITALS — HEIGHT: 63 IN | WEIGHT: 180 LBS | BODY MASS INDEX: 31.89 KG/M2

## 2020-02-06 DIAGNOSIS — R91.1 SOLITARY PULMONARY NODULE: ICD-10-CM

## 2020-02-06 DIAGNOSIS — R93.89 ABNORMAL FINDINGS ON DIAGNOSTIC IMAGING OF OTHER SPECIFIED BODY STRUCTURES: ICD-10-CM

## 2020-02-06 DIAGNOSIS — J44.9 CHRONIC OBSTRUCTIVE PULMONARY DISEASE, UNSPECIFIED: ICD-10-CM

## 2020-02-06 PROCEDURE — 94010 BREATHING CAPACITY TEST: CPT

## 2020-02-06 PROCEDURE — 99214 OFFICE O/P EST MOD 30 MIN: CPT | Mod: 25

## 2020-02-06 RX ORDER — METHYLPREDNISOLONE 8 MG/1
8 TABLET ORAL
Refills: 0 | Status: ACTIVE | COMMUNITY
Start: 2020-02-06

## 2020-02-06 NOTE — REASON FOR VISIT
[Follow-Up] : a follow-up visit [Abnormal CXR/ Chest CT] : an abnormal CXR/ chest CT [Lung Cancer] : lung cancer [COPD] : COPD

## 2020-02-06 NOTE — REVIEW OF SYSTEMS
[SOB on Exertion] : sob on exertion [Arthralgias] : arthralgias [Myalgias] : myalgias [Negative] : Endocrine [TextBox_[id number]] : Per HPI

## 2020-03-05 ENCOUNTER — FORM ENCOUNTER (OUTPATIENT)
Age: 70
End: 2020-03-05

## 2020-03-06 ENCOUNTER — OUTPATIENT (OUTPATIENT)
Dept: OUTPATIENT SERVICES | Facility: HOSPITAL | Age: 70
LOS: 1 days | End: 2020-03-06
Payer: MEDICARE

## 2020-03-06 ENCOUNTER — APPOINTMENT (OUTPATIENT)
Dept: CT IMAGING | Facility: CLINIC | Age: 70
End: 2020-03-06
Payer: MEDICARE

## 2020-03-06 DIAGNOSIS — D25.9 LEIOMYOMA OF UTERUS, UNSPECIFIED: Chronic | ICD-10-CM

## 2020-03-06 DIAGNOSIS — Z87.42 PERSONAL HISTORY OF OTHER DISEASES OF THE FEMALE GENITAL TRACT: Chronic | ICD-10-CM

## 2020-03-06 DIAGNOSIS — R93.89 ABNORMAL FINDINGS ON DIAGNOSTIC IMAGING OF OTHER SPECIFIED BODY STRUCTURES: ICD-10-CM

## 2020-03-06 DIAGNOSIS — J44.9 CHRONIC OBSTRUCTIVE PULMONARY DISEASE, UNSPECIFIED: ICD-10-CM

## 2020-03-06 DIAGNOSIS — R91.1 SOLITARY PULMONARY NODULE: ICD-10-CM

## 2020-03-06 DIAGNOSIS — Z00.00 ENCOUNTER FOR GENERAL ADULT MEDICAL EXAMINATION WITHOUT ABNORMAL FINDINGS: ICD-10-CM

## 2020-03-06 PROCEDURE — 71250 CT THORAX DX C-: CPT

## 2020-03-06 PROCEDURE — 71250 CT THORAX DX C-: CPT | Mod: 26

## 2020-03-20 ENCOUNTER — TRANSCRIPTION ENCOUNTER (OUTPATIENT)
Age: 70
End: 2020-03-20

## 2020-04-03 NOTE — ED PROVIDER NOTE - DIAGNOSIS COUNSELING, MDM
Myself Myself conducted a detailed discussion... I had a detailed discussion with the patient and/or guardian regarding the historical points, exam findings, and any diagnostic results supporting the discharge/admit diagnosis.

## 2020-06-04 ENCOUNTER — INPATIENT (INPATIENT)
Facility: HOSPITAL | Age: 70
LOS: 6 days | Discharge: NURSING, REHAB OR LONG-TERM CARE | DRG: 683 | End: 2020-06-11
Attending: HOSPITALIST | Admitting: HOSPITALIST
Payer: MEDICARE

## 2020-06-04 VITALS
TEMPERATURE: 98 F | WEIGHT: 179.9 LBS | SYSTOLIC BLOOD PRESSURE: 78 MMHG | OXYGEN SATURATION: 94 % | DIASTOLIC BLOOD PRESSURE: 55 MMHG | HEIGHT: 63 IN | RESPIRATION RATE: 14 BRPM | HEART RATE: 67 BPM

## 2020-06-04 DIAGNOSIS — Z87.42 PERSONAL HISTORY OF OTHER DISEASES OF THE FEMALE GENITAL TRACT: Chronic | ICD-10-CM

## 2020-06-04 DIAGNOSIS — D25.9 LEIOMYOMA OF UTERUS, UNSPECIFIED: Chronic | ICD-10-CM

## 2020-06-04 DIAGNOSIS — N17.9 ACUTE KIDNEY FAILURE, UNSPECIFIED: ICD-10-CM

## 2020-06-04 LAB
ALBUMIN SERPL ELPH-MCNC: 3.8 G/DL — SIGNIFICANT CHANGE UP (ref 3.3–5.2)
ALP SERPL-CCNC: 64 U/L — SIGNIFICANT CHANGE UP (ref 40–120)
ALT FLD-CCNC: 13 U/L — SIGNIFICANT CHANGE UP
ANION GAP SERPL CALC-SCNC: 16 MMOL/L — SIGNIFICANT CHANGE UP (ref 5–17)
ANION GAP SERPL CALC-SCNC: 18 MMOL/L — HIGH (ref 5–17)
AST SERPL-CCNC: 18 U/L — SIGNIFICANT CHANGE UP
BILIRUB SERPL-MCNC: 0.3 MG/DL — LOW (ref 0.4–2)
BUN SERPL-MCNC: 108 MG/DL — HIGH (ref 8–20)
BUN SERPL-MCNC: 81 MG/DL — HIGH (ref 8–20)
CALCIUM SERPL-MCNC: 8.6 MG/DL — SIGNIFICANT CHANGE UP (ref 8.6–10.2)
CALCIUM SERPL-MCNC: 9.8 MG/DL — SIGNIFICANT CHANGE UP (ref 8.6–10.2)
CHLORIDE SERPL-SCNC: 107 MMOL/L — SIGNIFICANT CHANGE UP (ref 98–107)
CHLORIDE SERPL-SCNC: 98 MMOL/L — SIGNIFICANT CHANGE UP (ref 98–107)
CK SERPL-CCNC: 107 U/L — SIGNIFICANT CHANGE UP (ref 25–170)
CO2 SERPL-SCNC: 17 MMOL/L — LOW (ref 22–29)
CO2 SERPL-SCNC: 19 MMOL/L — LOW (ref 22–29)
CREAT SERPL-MCNC: 1.95 MG/DL — HIGH (ref 0.5–1.3)
CREAT SERPL-MCNC: 3.15 MG/DL — HIGH (ref 0.5–1.3)
GLUCOSE SERPL-MCNC: 100 MG/DL — HIGH (ref 70–99)
GLUCOSE SERPL-MCNC: 136 MG/DL — HIGH (ref 70–99)
HCT VFR BLD CALC: 39.5 % — SIGNIFICANT CHANGE UP (ref 34.5–45)
HGB BLD-MCNC: 12.9 G/DL — SIGNIFICANT CHANGE UP (ref 11.5–15.5)
LACTATE BLDV-MCNC: 1.2 MMOL/L — SIGNIFICANT CHANGE UP (ref 0.5–2)
MCHC RBC-ENTMCNC: 30.8 PG — SIGNIFICANT CHANGE UP (ref 27–34)
MCHC RBC-ENTMCNC: 32.7 GM/DL — SIGNIFICANT CHANGE UP (ref 32–36)
MCV RBC AUTO: 94.3 FL — SIGNIFICANT CHANGE UP (ref 80–100)
PLATELET # BLD AUTO: 369 K/UL — SIGNIFICANT CHANGE UP (ref 150–400)
POTASSIUM SERPL-MCNC: 4.2 MMOL/L — SIGNIFICANT CHANGE UP (ref 3.5–5.3)
POTASSIUM SERPL-MCNC: 4.2 MMOL/L — SIGNIFICANT CHANGE UP (ref 3.5–5.3)
POTASSIUM SERPL-SCNC: 4.2 MMOL/L — SIGNIFICANT CHANGE UP (ref 3.5–5.3)
POTASSIUM SERPL-SCNC: 4.2 MMOL/L — SIGNIFICANT CHANGE UP (ref 3.5–5.3)
PROT SERPL-MCNC: 6.5 G/DL — LOW (ref 6.6–8.7)
RBC # BLD: 4.19 M/UL — SIGNIFICANT CHANGE UP (ref 3.8–5.2)
RBC # FLD: 13.2 % — SIGNIFICANT CHANGE UP (ref 10.3–14.5)
SODIUM SERPL-SCNC: 135 MMOL/L — SIGNIFICANT CHANGE UP (ref 135–145)
SODIUM SERPL-SCNC: 139 MMOL/L — SIGNIFICANT CHANGE UP (ref 135–145)
T3 SERPL-MCNC: 70 NG/DL — LOW (ref 80–200)
TROPONIN T SERPL-MCNC: 0.05 NG/ML — SIGNIFICANT CHANGE UP (ref 0–0.06)
WBC # BLD: 19.32 K/UL — HIGH (ref 3.8–10.5)
WBC # FLD AUTO: 19.32 K/UL — HIGH (ref 3.8–10.5)

## 2020-06-04 PROCEDURE — 99223 1ST HOSP IP/OBS HIGH 75: CPT

## 2020-06-04 PROCEDURE — 72170 X-RAY EXAM OF PELVIS: CPT | Mod: 26

## 2020-06-04 PROCEDURE — 72131 CT LUMBAR SPINE W/O DYE: CPT | Mod: 26

## 2020-06-04 PROCEDURE — 99291 CRITICAL CARE FIRST HOUR: CPT

## 2020-06-04 PROCEDURE — 71045 X-RAY EXAM CHEST 1 VIEW: CPT | Mod: 26

## 2020-06-04 PROCEDURE — 71275 CT ANGIOGRAPHY CHEST: CPT | Mod: 26

## 2020-06-04 PROCEDURE — 74177 CT ABD & PELVIS W/CONTRAST: CPT | Mod: 26

## 2020-06-04 PROCEDURE — 93010 ELECTROCARDIOGRAM REPORT: CPT

## 2020-06-04 PROCEDURE — 72128 CT CHEST SPINE W/O DYE: CPT | Mod: 26

## 2020-06-04 PROCEDURE — 70450 CT HEAD/BRAIN W/O DYE: CPT | Mod: 26

## 2020-06-04 PROCEDURE — 72125 CT NECK SPINE W/O DYE: CPT | Mod: 26

## 2020-06-04 RX ORDER — AMLODIPINE BESYLATE 2.5 MG/1
1 TABLET ORAL
Qty: 0 | Refills: 0 | DISCHARGE

## 2020-06-04 RX ORDER — SODIUM CHLORIDE 9 MG/ML
1000 INJECTION INTRAMUSCULAR; INTRAVENOUS; SUBCUTANEOUS ONCE
Refills: 0 | Status: COMPLETED | OUTPATIENT
Start: 2020-06-04 | End: 2020-06-04

## 2020-06-04 RX ORDER — METOPROLOL TARTRATE 50 MG
25 TABLET ORAL AT BEDTIME
Refills: 0 | Status: DISCONTINUED | OUTPATIENT
Start: 2020-06-04 | End: 2020-06-10

## 2020-06-04 RX ORDER — ALPRAZOLAM 0.25 MG
0.5 TABLET ORAL
Refills: 0 | Status: DISCONTINUED | OUTPATIENT
Start: 2020-06-04 | End: 2020-06-11

## 2020-06-04 RX ORDER — CHOLECALCIFEROL (VITAMIN D3) 125 MCG
1000 CAPSULE ORAL DAILY
Refills: 0 | Status: DISCONTINUED | OUTPATIENT
Start: 2020-06-04 | End: 2020-06-11

## 2020-06-04 RX ORDER — IPRATROPIUM/ALBUTEROL SULFATE 18-103MCG
3 AEROSOL WITH ADAPTER (GRAM) INHALATION ONCE
Refills: 0 | Status: COMPLETED | OUTPATIENT
Start: 2020-06-04 | End: 2020-06-04

## 2020-06-04 RX ORDER — ATORVASTATIN CALCIUM 80 MG/1
20 TABLET, FILM COATED ORAL AT BEDTIME
Refills: 0 | Status: DISCONTINUED | OUTPATIENT
Start: 2020-06-04 | End: 2020-06-11

## 2020-06-04 RX ORDER — CHOLECALCIFEROL (VITAMIN D3) 125 MCG
1 CAPSULE ORAL
Qty: 0 | Refills: 0 | DISCHARGE

## 2020-06-04 RX ORDER — ENOXAPARIN SODIUM 100 MG/ML
30 INJECTION SUBCUTANEOUS DAILY
Refills: 0 | Status: DISCONTINUED | OUTPATIENT
Start: 2020-06-04 | End: 2020-06-06

## 2020-06-04 RX ORDER — SODIUM CHLORIDE 9 MG/ML
1000 INJECTION, SOLUTION INTRAVENOUS
Refills: 0 | Status: DISCONTINUED | OUTPATIENT
Start: 2020-06-04 | End: 2020-06-11

## 2020-06-04 RX ORDER — HYDRALAZINE HCL 50 MG
50 TABLET ORAL EVERY 8 HOURS
Refills: 0 | Status: DISCONTINUED | OUTPATIENT
Start: 2020-06-04 | End: 2020-06-04

## 2020-06-04 RX ORDER — PREGABALIN 225 MG/1
1 CAPSULE ORAL
Qty: 0 | Refills: 0 | DISCHARGE

## 2020-06-04 RX ORDER — METOPROLOL TARTRATE 50 MG
50 TABLET ORAL DAILY
Refills: 0 | Status: DISCONTINUED | OUTPATIENT
Start: 2020-06-04 | End: 2020-06-10

## 2020-06-04 RX ORDER — SODIUM CHLORIDE 9 MG/ML
1000 INJECTION INTRAMUSCULAR; INTRAVENOUS; SUBCUTANEOUS
Refills: 0 | Status: DISCONTINUED | OUTPATIENT
Start: 2020-06-04 | End: 2020-06-04

## 2020-06-04 RX ORDER — ASPIRIN/CALCIUM CARB/MAGNESIUM 324 MG
81 TABLET ORAL DAILY
Refills: 0 | Status: DISCONTINUED | OUTPATIENT
Start: 2020-06-04 | End: 2020-06-11

## 2020-06-04 RX ORDER — DILTIAZEM HCL 120 MG
240 CAPSULE, EXT RELEASE 24 HR ORAL DAILY
Refills: 0 | Status: DISCONTINUED | OUTPATIENT
Start: 2020-06-04 | End: 2020-06-04

## 2020-06-04 RX ADMIN — ATORVASTATIN CALCIUM 20 MILLIGRAM(S): 80 TABLET, FILM COATED ORAL at 20:47

## 2020-06-04 RX ADMIN — SODIUM CHLORIDE 1000 MILLILITER(S): 9 INJECTION INTRAMUSCULAR; INTRAVENOUS; SUBCUTANEOUS at 13:54

## 2020-06-04 RX ADMIN — Medication 25 MILLIGRAM(S): at 20:47

## 2020-06-04 RX ADMIN — SODIUM CHLORIDE 1000 MILLILITER(S): 9 INJECTION INTRAMUSCULAR; INTRAVENOUS; SUBCUTANEOUS at 14:20

## 2020-06-04 RX ADMIN — SODIUM CHLORIDE 1000 MILLILITER(S): 9 INJECTION INTRAMUSCULAR; INTRAVENOUS; SUBCUTANEOUS at 15:35

## 2020-06-04 RX ADMIN — SODIUM CHLORIDE 100 MILLILITER(S): 9 INJECTION, SOLUTION INTRAVENOUS at 18:27

## 2020-06-04 RX ADMIN — SODIUM CHLORIDE 1000 MILLILITER(S): 9 INJECTION INTRAMUSCULAR; INTRAVENOUS; SUBCUTANEOUS at 15:20

## 2020-06-04 RX ADMIN — Medication 0.5 MILLIGRAM(S): at 23:43

## 2020-06-04 RX ADMIN — SODIUM CHLORIDE 1000 MILLILITER(S): 9 INJECTION INTRAMUSCULAR; INTRAVENOUS; SUBCUTANEOUS at 14:54

## 2020-06-04 RX ADMIN — SODIUM CHLORIDE 1000 MILLILITER(S): 9 INJECTION INTRAMUSCULAR; INTRAVENOUS; SUBCUTANEOUS at 16:35

## 2020-06-04 NOTE — H&P ADULT - HISTORY OF PRESENT ILLNESS
Patient is a 69 y/o female with pmh of lung CA, copd not on home o2, CVA with left sided residual weakness, htn and rheumatoid arthritis presents with worsening weakness especially on the left side, lightheadness and a mechanical fall. Patient states her rheumatologist started her on po steroids and hydroxychloroquine and ever since than has not felt well. Patient denies any NSAID use, fever, chills nausea, vomiting or diarrhea. Patient was worked up in the er and found to have RAINE with CR greater than 3 and was also hypotensive. Patient had multiple scans and any fractures were ruled out. Patient does admit to not eating or drinking well for the past few weeks and also notived decreased urinary output.

## 2020-06-04 NOTE — ED PROVIDER NOTE - OBJECTIVE STATEMENT
69 y/o female hx lung cancer in past (in remission per pt), copd (no home O2) rheumatoid arthritis for which pt has been on hydroxychlorine and prednisone (down to 2mg daily) for 3 months, htn c/o several weeks/months of progressive weakness since starting new RA meds. Pt states felt week and fell 4 days ago, did not hit head, no loc. C/o generalized weakness, with some lightheadedness, mild generalized neck/back pain for weeks. Denies urinary symptosm, f/c, neuro symptoms, chest pain or sob. Decreased po intake for several days.     ROS: No fever/chills. No eye pain/changes in vision, No ear pain/sore throat/dysphagia, No chest pain/palpitations. No SOB/cough/. No abdominal pain, N/V/D, no black/bloody bm. No dysuria/frequency/discharge, No headache.   No rashes or breaks in skin. No numbness/tingling/weakness.

## 2020-06-04 NOTE — ED ADULT NURSE REASSESSMENT NOTE - NS ED NURSE REASSESS COMMENT FT1
admitting MD @ bedside.
assumed care of pt at this time. pt a+ox3, lying comfortably in bed. pt reports relief of lower back pain. NSR on monitor @ 71. pt in no apparent distress, will continue to monitor.
report given to PA Proctor. pt transported to CDU in stable condition.
Patient with persistent hypotension.  Dr. Duff called to bedside, additional IV acces placed and normal saline bolus administered.  Additional blood work drawn and sent, patient remains alert and oriented with no additional complaints.  remains on cardiac monitor.
pt transported to ct scan on cardiac monitor.

## 2020-06-04 NOTE — H&P ADULT - ASSESSMENT
1) RAINE - hold ace-I  --> renal consulted, to see in am Dr Bess  --> trend bmp  --> c.w fluids  --> check stat cpk and bmp now    2) Weakness - likley secondary to dehydration   --> send tsh and vit b12 in am   --> pt consult    3) htn - currently hypotensive  --> hold all bp meds    4) History of cva 0- cw asa and statin     5) Bilateral GGO - send covid  --> procalcitonin, crp and ldh sent for am     6) diet - renal diet    7 dvt prop - lovenox sub q 1) RAINE - hold ace-I  --> renal consulted, to see in am Dr Bess  --> trend bmp  --> c.w fluids  --> check stat cpk and bmp now    2) Weakness - likley secondary to dehydration   -->send vit b12  --> tsh normal   --> pt consult    3) htn - currently hypotensive  --> hold all bp meds    4) History of cva - cw asa and statin     5) Bilateral GGO - send covid  --> procalcitonin, crp and ldh sent for am     6) diet - renal diet    7 dvt prop - lovenox sub q

## 2020-06-04 NOTE — ED PROVIDER NOTE - CLINICAL SUMMARY MEDICAL DECISION MAKING FREE TEXT BOX
weakness, hypotense to ~70/40, hypoxic intermittently, mentating and comfortable appearing, afebrile. labs, fluids, oxygen prn, cta chest, ct abd/pel given hypotension with back pain. cortisol/tsh levels. reassess.   pt received 2L fluid prior to ct with contrast, likely prerenal element and several emergent diagnoses must be ruled out with contrast.

## 2020-06-04 NOTE — ED ADULT NURSE NOTE - OBJECTIVE STATEMENT
Has been weak for a while, fell while bending over on Monday.  States she wasn't dizzy, didn't pass out. Did not hit head, did not lose consciousness.  Hypotensive and hypoxic in ED. Has been weak for a while, fell while bending over on Monday.  States she wasn't dizzy, didn't pass out. Did not hit head, did not lose consciousness.  Hypotensive and hypoxic in ED.  Patient with GCS 15, alert and oriented x4, respirations are unlabored.  Denies dizziness/lightheadedness, denies abdominal pain.  C/o 5/10 neck and lower back pain while is usually how her chronic pain feels.  Reports decreased po appetite, insomnia over the past few days.  Hx of CVA. Has been weak for a while, fell while bending over on Monday.  States she wasn't dizzy, didn't pass out. Did not hit head, did not lose consciousness.  Hypotensive and hypoxic in ED.  Patient with GCS 15, alert and oriented x4, respirations are unlabored.  Denies dizziness/lightheadedness, denies abdominal pain.  C/o 5/10 neck and lower back pain while is usually how her chronic pain feels.  Reports decreased po appetite, insomnia over the past few days.  Hx of CVA.  c/o heart burn and nausea.

## 2020-06-04 NOTE — ED ADULT NURSE NOTE - NSIMPLEMENTINTERV_GEN_ALL_ED
Implemented All Fall with Harm Risk Interventions:  [place] to call system. Call bell, personal items and telephone within reach. Instruct patient to call for assistance. Room bathroom lighting operational. Non-slip footwear when patient is off stretcher. Physically safe environment: no spills, clutter or unnecessary equipment. Stretcher in lowest position, wheels locked, appropriate side rails in place. Provide visual cue, wrist band, yellow gown, etc. Monitor gait and stability. Monitor for mental status changes and reorient to person, place, and time. Review medications for side effects contributing to fall risk. Reinforce activity limits and safety measures with patient and family. Provide visual clues: red socks.

## 2020-06-04 NOTE — ED PROVIDER NOTE - CARE PLAN
Principal Discharge DX:	Acute kidney failure  Secondary Diagnosis:	Hypotension  Secondary Diagnosis:	Dehydration

## 2020-06-04 NOTE — PHARMACOTHERAPY INTERVENTION NOTE - COMMENTS
Contacted patient's pharmacy and spoke with patient to obtain complete medication list. Patient reports starting to take methylprednisolone 2mg every other day 5/26

## 2020-06-04 NOTE — ED ADULT TRIAGE NOTE - CHIEF COMPLAINT QUOTE
Patient A&Ox4 complaining of neck & lower back pain secondary to trip & fall x4 days ago. Reports increased difficulty ambulating since. Also complaining of dizziness this morning. Found to be hypotensive in triage. Placed in critical care area, MD called to bedside for eval.

## 2020-06-04 NOTE — H&P ADULT - NSHPREVIEWOFSYSTEMS_GEN_ALL_CORE
REVIEW OF SYSTEMS:    CONSTITUTIONAL: fatigue  EYES: No eye pain, visual disturbances, or discharge  ENMT:  No difficulty hearing, tinnitus, vertigo; No sinus or throat pain  NECK: No pain or stiffness  BREASTS: No pain, masses, or nipple discharge  RESPIRATORY: No cough, wheezing, chills or hemoptysis; No shortness of breath  CARDIOVASCULAR: No chest pain, palpitations, dizziness, or leg swelling  GASTROINTESTINAL: No abdominal or epigastric pain. No nausea, vomiting, or hematemesis; No diarrhea or constipation. No melena or hematochezia.  GENITOURINARY: decreased frequency   NEUROLOGICAL: weakness, lethargy   SKIN: No itching, burning, rashes, or lesions   LYMPH NODES: No enlarged glands  ENDOCRINE: No heat or cold intolerance; No hair loss  MUSCULOSKELETAL: back pain   PSYCHIATRIC: No depression, anxiety, mood swings, or difficulty sleeping  HEME/LYMPH: No easy bruising, or bleeding gums  ALLERY AND IMMUNOLOGIC: No hives or eczema

## 2020-06-04 NOTE — CHART NOTE - NSCHARTNOTEFT_GEN_A_CORE
Called by RN bc Pt retaining 638cc urine. Ordered straight cath. Called by RN bc Pt has not voided. Pt bladder scanned by RN, retaining 638cc urine. Ordered straight cath.

## 2020-06-04 NOTE — ED PROVIDER NOTE - PHYSICAL EXAMINATION
Gen: No acute distress, non toxic  HEENT: Mucous membranes dry, pink conjunctivae, EOMI  CV: RRR, nl s1/s2.  Resp: CTAB, normal rate and effort  GI: Abdomen soft, NT, ND. No rebound, no guarding  : No CVAT  Neuro: A&O x 3, moving all 4 extremities. cn 2-12 wnl. nl motor and sensation.   MSK: No spine or joint tenderness to palpation  Skin: No rashes. intact and perfused.

## 2020-06-04 NOTE — H&P ADULT - NSICDXFAMILYHX_GEN_ALL_CORE_FT
FAMILY HISTORY:  Family history of heart disease    Sibling  Still living? Yes, Estimated age: 61-70  Family history of hypertension, Age at diagnosis: Age Unknown  Family history of premature CAD, Age at diagnosis: 61-70

## 2020-06-04 NOTE — ED PROVIDER NOTE - PROGRESS NOTE DETAILS
Given the significant and immediate threats to this patient based on initial presentation, the benefits of emergency contrast-enhanced CT imaging despites elevated creatinine serum level results greatly outweigh the potential risk of harm due to contrast-induced nephropathy. Sherin: pt hypoxia has resolved? no oxygen and satting 94-96% (had good wave form satting in high 80's before when hypotense). ct's without significant gross new abnormalities. bp's more steady systolic ~95. mentating well. cr elevated, likely prerenal given dehydration. admitted to Dr. Diana.

## 2020-06-04 NOTE — H&P ADULT - NSHPLABSRESULTS_GEN_ALL_CORE
12.9   19.32  )----------(  369       ( 04 Jun 2020 13:41 )               39.5      135    |  98     |  108.0  ----------------------------<  136        ( 04 Jun 2020 13:41 )  4.2     |  19.0   |  3.15     Ca    9.8        ( 04 Jun 2020 13:41 )    TPro  6.5    /  Alb  3.8    /  TBili  0.3    /  DBili  x      /  AST  18     /  ALT  13     /  AlkPhos  64     ( 04 Jun 2020 13:41 )    LIVER FUNCTIONS - ( 04 Jun 2020 13:41 )  Alb: 3.8 g/dL / Pro: 6.5 g/dL / ALK PHOS: 64 U/L / ALT: 13 U/L / AST: 18 U/L / GGT: x               CAPILLARY BLOOD GLUCOSE    CARDIAC MARKERS ( 04 Jun 2020 13:41 )  x     / 0.05 ng/mL / x     / x     / x        ct chest -   IMPRESSION:     No acute traumatic injury.    No pulmonary embolism.    Bilateral upper lung groundglass and nodular opacities persist and are indeterminate.     Developing more solid appearing nodules at the left apex and left lower lobe superior segment measure 1.6 cm and 0.9 cm, respectively (4; 24, 61). Malignancy cannot be excluded. Dedicated thoracic oncology follow-up is advised.

## 2020-06-05 ENCOUNTER — TRANSCRIPTION ENCOUNTER (OUTPATIENT)
Age: 70
End: 2020-06-05

## 2020-06-05 DIAGNOSIS — E87.2 ACIDOSIS: ICD-10-CM

## 2020-06-05 LAB
ALBUMIN SERPL ELPH-MCNC: 3 G/DL — LOW (ref 3.3–5.2)
ALP SERPL-CCNC: 55 U/L — SIGNIFICANT CHANGE UP (ref 40–120)
ALT FLD-CCNC: 11 U/L — SIGNIFICANT CHANGE UP
ANION GAP SERPL CALC-SCNC: 18 MMOL/L — HIGH (ref 5–17)
APPEARANCE UR: CLEAR — SIGNIFICANT CHANGE UP
AST SERPL-CCNC: 16 U/L — SIGNIFICANT CHANGE UP
BACTERIA # UR AUTO: ABNORMAL
BASOPHILS # BLD AUTO: 0.11 K/UL — SIGNIFICANT CHANGE UP (ref 0–0.2)
BASOPHILS NFR BLD AUTO: 0.9 % — SIGNIFICANT CHANGE UP (ref 0–2)
BILIRUB SERPL-MCNC: <0.2 MG/DL — LOW (ref 0.4–2)
BILIRUB UR-MCNC: NEGATIVE — SIGNIFICANT CHANGE UP
BUN SERPL-MCNC: 62 MG/DL — HIGH (ref 8–20)
CALCIUM SERPL-MCNC: 8.7 MG/DL — SIGNIFICANT CHANGE UP (ref 8.6–10.2)
CHLORIDE SERPL-SCNC: 107 MMOL/L — SIGNIFICANT CHANGE UP (ref 98–107)
CO2 SERPL-SCNC: 16 MMOL/L — LOW (ref 22–29)
COLOR SPEC: YELLOW — SIGNIFICANT CHANGE UP
CREAT SERPL-MCNC: 1.4 MG/DL — HIGH (ref 0.5–1.3)
CRP SERPL-MCNC: 0.73 MG/DL — HIGH (ref 0–0.4)
D DIMER BLD IA.RAPID-MCNC: 644 NG/ML DDU — HIGH
DIFF PNL FLD: NEGATIVE — SIGNIFICANT CHANGE UP
EOSINOPHIL # BLD AUTO: 0.12 K/UL — SIGNIFICANT CHANGE UP (ref 0–0.5)
EOSINOPHIL NFR BLD AUTO: 1 % — SIGNIFICANT CHANGE UP (ref 0–6)
EPI CELLS # UR: SIGNIFICANT CHANGE UP
FERRITIN SERPL-MCNC: 570 NG/ML — HIGH (ref 15–150)
GLUCOSE SERPL-MCNC: 69 MG/DL — LOW (ref 70–99)
GLUCOSE UR QL: NEGATIVE — SIGNIFICANT CHANGE UP
HCT VFR BLD CALC: 37.9 % — SIGNIFICANT CHANGE UP (ref 34.5–45)
HCV AB S/CO SERPL IA: 0.19 S/CO — SIGNIFICANT CHANGE UP (ref 0–0.99)
HCV AB SERPL-IMP: SIGNIFICANT CHANGE UP
HGB BLD-MCNC: 12.3 G/DL — SIGNIFICANT CHANGE UP (ref 11.5–15.5)
IMM GRANULOCYTES NFR BLD AUTO: 3.4 % — HIGH (ref 0–1.5)
KETONES UR-MCNC: ABNORMAL
LDH SERPL L TO P-CCNC: 313 U/L — HIGH (ref 98–192)
LEUKOCYTE ESTERASE UR-ACNC: ABNORMAL
LYMPHOCYTES # BLD AUTO: 1.64 K/UL — SIGNIFICANT CHANGE UP (ref 1–3.3)
LYMPHOCYTES # BLD AUTO: 13.2 % — SIGNIFICANT CHANGE UP (ref 13–44)
MAGNESIUM SERPL-MCNC: 2.6 MG/DL — SIGNIFICANT CHANGE UP (ref 1.6–2.6)
MCHC RBC-ENTMCNC: 31.2 PG — SIGNIFICANT CHANGE UP (ref 27–34)
MCHC RBC-ENTMCNC: 32.5 GM/DL — SIGNIFICANT CHANGE UP (ref 32–36)
MCV RBC AUTO: 96.2 FL — SIGNIFICANT CHANGE UP (ref 80–100)
MONOCYTES # BLD AUTO: 1.38 K/UL — HIGH (ref 0–0.9)
MONOCYTES NFR BLD AUTO: 11.1 % — SIGNIFICANT CHANGE UP (ref 2–14)
NEUTROPHILS # BLD AUTO: 8.79 K/UL — HIGH (ref 1.8–7.4)
NEUTROPHILS NFR BLD AUTO: 70.4 % — SIGNIFICANT CHANGE UP (ref 43–77)
NITRITE UR-MCNC: POSITIVE
PH UR: 5 — SIGNIFICANT CHANGE UP (ref 5–8)
PHOSPHATE SERPL-MCNC: 3 MG/DL — SIGNIFICANT CHANGE UP (ref 2.4–4.7)
PLATELET # BLD AUTO: 209 K/UL — SIGNIFICANT CHANGE UP (ref 150–400)
POTASSIUM SERPL-MCNC: 4.1 MMOL/L — SIGNIFICANT CHANGE UP (ref 3.5–5.3)
POTASSIUM SERPL-SCNC: 4.1 MMOL/L — SIGNIFICANT CHANGE UP (ref 3.5–5.3)
PROCALCITONIN SERPL-MCNC: 0.23 NG/ML — HIGH (ref 0.02–0.1)
PROT SERPL-MCNC: 5.4 G/DL — LOW (ref 6.6–8.7)
PROT UR-MCNC: 15
RBC # BLD: 3.94 M/UL — SIGNIFICANT CHANGE UP (ref 3.8–5.2)
RBC # FLD: 13.4 % — SIGNIFICANT CHANGE UP (ref 10.3–14.5)
RBC CASTS # UR COMP ASSIST: SIGNIFICANT CHANGE UP /HPF (ref 0–4)
SARS-COV-2 RNA SPEC QL NAA+PROBE: SIGNIFICANT CHANGE UP
SODIUM SERPL-SCNC: 141 MMOL/L — SIGNIFICANT CHANGE UP (ref 135–145)
SP GR SPEC: 1.01 — SIGNIFICANT CHANGE UP (ref 1.01–1.02)
TROPONIN T SERPL-MCNC: 0.01 NG/ML — SIGNIFICANT CHANGE UP (ref 0–0.06)
UROBILINOGEN FLD QL: NEGATIVE — SIGNIFICANT CHANGE UP
VIT B12 SERPL-MCNC: >2000 PG/ML — HIGH (ref 232–1245)
WBC # BLD: 12.47 K/UL — HIGH (ref 3.8–10.5)
WBC # FLD AUTO: 12.47 K/UL — HIGH (ref 3.8–10.5)
WBC UR QL: SIGNIFICANT CHANGE UP

## 2020-06-05 PROCEDURE — 99233 SBSQ HOSP IP/OBS HIGH 50: CPT

## 2020-06-05 RX ORDER — HYDRALAZINE HCL 50 MG
0.5 TABLET ORAL
Qty: 45 | Refills: 0
Start: 2020-06-05 | End: 2020-07-04

## 2020-06-05 RX ORDER — SODIUM BICARBONATE 1 MEQ/ML
1300 SYRINGE (ML) INTRAVENOUS THREE TIMES A DAY
Refills: 0 | Status: DISCONTINUED | OUTPATIENT
Start: 2020-06-05 | End: 2020-06-11

## 2020-06-05 RX ORDER — LISINOPRIL 2.5 MG/1
1 TABLET ORAL
Qty: 0 | Refills: 0 | DISCHARGE

## 2020-06-05 RX ORDER — SODIUM BICARBONATE 1 MEQ/ML
2 SYRINGE (ML) INTRAVENOUS
Qty: 84 | Refills: 0
Start: 2020-06-05 | End: 2020-06-18

## 2020-06-05 RX ORDER — METOPROLOL TARTRATE 50 MG
1 TABLET ORAL
Qty: 60 | Refills: 0
Start: 2020-06-05 | End: 2020-07-04

## 2020-06-05 RX ORDER — METOPROLOL TARTRATE 50 MG
1 TABLET ORAL
Qty: 0 | Refills: 0 | DISCHARGE

## 2020-06-05 RX ADMIN — SODIUM CHLORIDE 100 MILLILITER(S): 9 INJECTION, SOLUTION INTRAVENOUS at 08:10

## 2020-06-05 RX ADMIN — ATORVASTATIN CALCIUM 20 MILLIGRAM(S): 80 TABLET, FILM COATED ORAL at 21:45

## 2020-06-05 RX ADMIN — Medication 1300 MILLIGRAM(S): at 12:09

## 2020-06-05 RX ADMIN — Medication 2 MILLIGRAM(S): at 08:05

## 2020-06-05 RX ADMIN — Medication 1300 MILLIGRAM(S): at 21:45

## 2020-06-05 RX ADMIN — Medication 1000 UNIT(S): at 08:05

## 2020-06-05 RX ADMIN — Medication 50 MILLIGRAM(S): at 06:07

## 2020-06-05 RX ADMIN — Medication 25 MILLIGRAM(S): at 21:45

## 2020-06-05 RX ADMIN — Medication 81 MILLIGRAM(S): at 08:05

## 2020-06-05 RX ADMIN — ENOXAPARIN SODIUM 30 MILLIGRAM(S): 100 INJECTION SUBCUTANEOUS at 08:05

## 2020-06-05 RX ADMIN — Medication 0.5 MILLIGRAM(S): at 21:44

## 2020-06-05 NOTE — DISCHARGE NOTE PROVIDER - HOSPITAL COURSE
Patient is a 71 y/o female with pmh of lung CA, copd not on home o2, CVA with left sided residual weakness, htn and rheumatoid arthritis presents with worsening weakness especially on the left side, lightheadness and a mechanical fall. Patient states her rheumatologist started her on po steroids and hydroxychloroquine and ever since than, has not felt well. Patient denies any NSAID use, fever, chills nausea, vomiting or diarrhea. Patient was worked up in the er and found to have RAINE with CR greater than 3 and was also hypotensive. Patient had multiple scans and all fractures were ruled out. Patient does admit to not eating or drinking well for the past few weeks and also notived decreased urinary output.         patient admitted to medicine, lisinopril was held and renal consulted. patient started on fluids and cr improved. patientr is now stable for dc home. Patient advised strongly to stop taking lisinopril until cleared by her cardiologist. patient also given phone numbers for a new rheumatologist        time spent on dc 32 minutes        PE:    	GENERAL: NAD, obese, pleasant     	HEAD:  Atraumatic, Normocephalic    	EYES: EOMI, PERRLA, conjunctiva and sclera clear    	ENMT: No tonsillar erythema, exudates, or enlargement; Moist mucous membranes, Good dentition, No lesions    	NECK: Supple, No JVD, Normal thyroid    	NERVOUS SYSTEM:  Alert & Oriented X3, Good concentration; Motor Strength 5/5 B/L upper and lower extremities; DTRs 2+ intact and symmetric    	CHEST/LUNG: diminished secondary to body habitus    	HEART: Regular rate and rhythm; No murmurs, rubs, or gallops    	ABDOMEN: Soft, Nontender, Nondistended; Bowel sounds present    	EXTREMITIES:  2+ Peripheral Pulses, No edema     	LYMPH: No lymphadenopathy noted    SKIN: No rashes or lesions Patient is a 71 y/o female with pmh of lung CA, copd not on home o2, CVA with left sided residual weakness, htn and rheumatoid arthritis presents with worsening weakness especially on the left side, lightheadness and a mechanical fall. Patient states her rheumatologist started her on po steroids and hydroxychloroquine and ever since than, has not felt well. Patient denies any NSAID use, fever, chills nausea, vomiting or diarrhea. Patient was worked up in the er and found to have RAINE with CR greater than 3 and was also hypotensive. Patient had multiple scans and all fractures were ruled out. Patient does admit to not eating or drinking well for the past few weeks and also notived decreased urinary output.         patient admitted to medicine, lisinopril was held and renal consulted. patient started on fluids and cr improved. patientr is now stable , on admission UA positive nitrate , urine cx ordered and iv rocephin initiated since wbc is high as well , cx resulted positive for klabsiella , seen by ID and after few days iv abx changed to vantin 200 mg bid till 6/14 per ID recommendation . Patient advised strongly to stop taking lisinopril until cleared by her cardiologist. patient also given phone numbers for a new rheumatologist         time spent on dc 30 min

## 2020-06-05 NOTE — DISCHARGE NOTE PROVIDER - NSDCFUSCHEDAPPT_GEN_ALL_CORE_FT
MELISSA BRADFORD ; 06/11/2020 ; [facility] [address] [place] [address] MELISSA BRADFORD ; 06/11/2020 ; [facility] [address] [place] [address] MELISSA BRADFORD ; 06/11/2020 ; [facility] [address] [place] [address]

## 2020-06-05 NOTE — DISCHARGE NOTE NURSING/CASE MANAGEMENT/SOCIAL WORK - PATIENT PORTAL LINK FT
You can access the FollowMyHealth Patient Portal offered by [facility] by registering at the following website: [email or web address]. By joining [employer]’s FollowMyHealth portal, you will also be able to view your health information using other applications (apps) compatible with our system.

## 2020-06-05 NOTE — CONSULT NOTE ADULT - SUBJECTIVE AND OBJECTIVE BOX
HPI:  Patient is a 69 y/o female with pmh of lung CA, copd not on home o2, CVA with left sided residual weakness, htn and rheumatoid arthritis presents with worsening weakness especially on the left side, lightheadness and a mechanical fall. Patient states her rheumatologist started her on po steroids and hydroxychloroquine and ever since than has not felt well. Patient denies any NSAID use, fever, chills nausea, vomiting or diarrhea. Patient was worked up in the er and found to have RAINE with CR greater than 3 and was also hypotensive. Patient had multiple scans and any fractures were ruled out. Patient does admit to not eating or drinking well for the past few weeks and also notived decreased urinary output.       PAST MEDICAL & SURGICAL HISTORY:  Lung cancer  Atrial fibrillation  Endometriosis  Hypertension  Fibroid tumor  History of ovarian cyst      FAMILY HISTORY:  Family history of premature CAD (Sibling)  Family history of hypertension (Sibling)  Family history of heart disease  [place]    Social History:Non smoker    MEDICATIONS  (STANDING):  aspirin  chewable 81 milliGRAM(s) Oral daily  atorvastatin 20 milliGRAM(s) Oral at bedtime  cholecalciferol 1000 Unit(s) Oral daily  enoxaparin Injectable 30 milliGRAM(s) SubCutaneous daily  lactated ringers. 1000 milliLiter(s) (100 mL/Hr) IV Continuous <Continuous>  methylPREDNISolone 2 milliGRAM(s) Oral every other day  metoprolol tartrate 25 milliGRAM(s) Oral at bedtime  metoprolol tartrate 50 milliGRAM(s) Oral daily    MEDICATIONS  (PRN):  ALPRAZolam 0.5 milliGRAM(s) Oral two times a day PRN anxiwty   Meds reviewed    Allergies    No Known Allergies    Intolerances     ? lasix    REVIEW OF SYSTEMS:    CONSTITUTIONAL:  sob, weight gain, feels weak  EYES: No eye pain, visual disturbances, or discharge  ENMT:  No difficulty hearing, tinnitus, vertigo; No sinus or throat pain  NECK: No pain or stiffness  BREASTS: No pain, masses, or nipple discharge  RESPIRATORY: sob  CARDIOVASCULAR: No chest pain, palpitations, dizziness,   GASTROINTESTINAL: No abdominal or epigastric pain. No nausea, vomiting, or hematemesis; No diarrhea or constipation. No melena or hematochezia.Trunckal obesity  GENITOURINARY: No dysuria, frequency, hematuria, or incontinence  NEUROLOGICAL: No headaches, memory loss, loss of strength, numbness, or tremors  SKIN: Diffuse erythema, no blisters  LYMPH NODES: No enlarged glands  ENDOCRINE: No heat or cold intolerance; No hair loss  MUSCULOSKELETAL: Chronic lymphedema legs with scars  PSYCHIATRIC: No depression, anxiety, mood swings, or difficulty sleeping  HEME/LYMPH: No easy bruising, or bleeding gums  ALLERY AND IMMUNOLOGIC: No hives or eczema      Vital Signs Last 24 Hrs  T(C): 36.3 (2020 07:12), Max: 36.7 (2020 13:15)  T(F): 97.4 (2020 07:12), Max: 98 (2020 13:15)  HR: 74 (2020 07:12) (62 - 74)  BP: 134/77 (2020 07:12) (70/32 - 134/77)  BP(mean): --  RR: 18 (2020 07:12) (14 - 18)  SpO2: 98% (2020 07:12) (82% - 98%)  Daily Height in cm: 160.02 (2020 13:15)    Daily     PHYSICAL EXAM:    GENERAL: appears chronically ill, oriented.  HEAD:  Atraumatic, Normocephalic  EYES: EOMI, PERRLA, conjunctiva and sclera clear  ENMT: No tonsillar erythema, exudates, or enlargement; Moist mucous membranes, Good dentition, No lesions  NECK: Supple, neck  veins full  NERVOUS SYSTEM:  Alert & Oriented X3, Good concentration; Motor Strength wnl upper and lower extremities;CHEST/LUNG: Clear to percussion bilaterally; No rales, rhonchi, wheezing, or rubs  HEART: Regular rate and rhythm; No murmurs, rubs, or gallops  ABDOMEN: Soft, Nontender, Nondistended; Bowel sounds present, body wall and flank edema  EXTREMITIES:  +2 - +3 leg edema with sligt erythema edema  LYMPH: No lymphadenopathy noted  SKIN: No rashes or lesions, pale      LABS:                        12.3   12.47 )-----------( 209      ( 2020 07:31 )             37.9     06-05    141  |  107  |  62.0<H>  ----------------------------<  69<L>  4.1   |  16.0<L>  |  1.40<H>    Ca    8.7      2020 07:31  Phos  3.0     06-05  Mg     2.6     06-05    TPro  5.4<L>  /  Alb  3.0<L>  /  TBili  <0.2<L>  /  DBili  x   /  AST  16  /  ALT  11  /  AlkPhos  55  06-      Urinalysis Basic - ( 2020 08:11 )    Color: Yellow / Appearance: Clear / S.015 / pH: x  Gluc: x / Ketone: Moderate  / Bili: Negative / Urobili: Negative   Blood: x / Protein: 15 / Nitrite: Positive   Leuk Esterase: Small / RBC: 0-2 /HPF / WBC 3-5   Sq Epi: x / Non Sq Epi: Occasional / Bacteria: Occasional      Magnesium, Serum: 2.6 mg/dL ( @ 07:31)  Phosphorus Level, Serum: 3.0 mg/dL ( @ 07:31)          RADIOLOGY & ADDITIONAL TESTS: HPI:  Patient is a 71 y/o female with pmh of lung CA, copd not on home o2, CVA with left sided residual weakness, htn and rheumatoid arthritis presents with worsening weakness especially on the left side, lightheadness and a mechanical fall. Patient states her rheumatologist started her on po steroids and hydroxychloroquine and ever since than has not felt well. Patient denies any NSAID use, fever, chills nausea, vomiting or diarrhea. Patient was worked up in the er and found to have RAINE with CR greater than 3 and was also hypotensive. Patient had multiple scans and any fractures were ruled out. Patient does admit to not eating or drinking well for the past few weeks and also noted decreased urinary output. Had cr 3 yest now down to 1.4 w IVF   Feeling better denies HA CP N/V/D + GOTTI      PAST MEDICAL & SURGICAL HISTORY:  Lung cancer  Atrial fibrillation  Endometriosis  Hypertension  Fibroid tumor  History of ovarian cyst      FAMILY HISTORY:  Family history of premature CAD (Sibling)  Family history of hypertension (Sibling)  Family history of heart disease  NC    Social History:Non smoker    MEDICATIONS  (STANDING):  aspirin  chewable 81 milliGRAM(s) Oral daily  atorvastatin 20 milliGRAM(s) Oral at bedtime  cholecalciferol 1000 Unit(s) Oral daily  enoxaparin Injectable 30 milliGRAM(s) SubCutaneous daily  lactated ringers. 1000 milliLiter(s) (100 mL/Hr) IV Continuous <Continuous>  methylPREDNISolone 2 milliGRAM(s) Oral every other day  metoprolol tartrate 25 milliGRAM(s) Oral at bedtime  metoprolol tartrate 50 milliGRAM(s) Oral daily    MEDICATIONS  (PRN):  ALPRAZolam 0.5 milliGRAM(s) Oral two times a day PRN anxiwty   Meds reviewed        Vital Signs Last 24 Hrs  T(C): 36.3 (2020 07:12), Max: 36.7 (2020 13:15)  T(F): 97.4 (2020 07:12), Max: 98 (2020 13:15)  HR: 74 (2020 07:12) (62 - 74)  BP: 134/77 (2020 07:12) (70/32 - 134/77)  BP(mean): --  RR: 18 (2020 07:12) (14 - 18)  SpO2: 98% (2020 07:12) (82% - 98%)  Daily Height in cm: 160.02 (2020 13:15)    Daily     PHYSICAL EXAM:    GENERAL: appears chronically ill  HEAD:  Atraumatic, Normocephalic  EYES: EOMI  NECK: Supple, neck  veins full  NERVOUS SYSTEM:  Alert & Oriented X3  CHEST/LUNG: Clear to percussion bilaterally  HEART: Regular rate and rhythm  ABDOMEN: Soft, Nontender, Nondistended; Bowel sounds present  EXTREMITIES: Trace LE edema      LABS:                        12.3   12.47 )-----------( 209      ( 2020 07:31 )             37.9     06-05    141  |  107  |  62.0<H>  ----------------------------<  69<L>  4.1   |  16.0<L>  |  1.40<H>    Ca    8.7      2020 07:31  Phos  3.0     06-05  Mg     2.6     06-05    TPro  5.4<L>  /  Alb  3.0<L>  /  TBili  <0.2<L>  /  DBili  x   /  AST  16  /  ALT  11  /  AlkPhos  55  06-05      Urinalysis Basic - ( 2020 08:11 )    Color: Yellow / Appearance: Clear / S.015 / pH: x  Gluc: x / Ketone: Moderate  / Bili: Negative / Urobili: Negative   Blood: x / Protein: 15 / Nitrite: Positive   Leuk Esterase: Small / RBC: 0-2 /HPF / WBC 3-5   Sq Epi: x / Non Sq Epi: Occasional / Bacteria: Occasional      Magnesium, Serum: 2.6 mg/dL ( @ 07:31)  Phosphorus Level, Serum: 3.0 mg/dL ( @ 07:31)          RADIOLOGY & ADDITIONAL TESTS:

## 2020-06-05 NOTE — DISCHARGE NOTE PROVIDER - NSDCMRMEDTOKEN_GEN_ALL_CORE_FT
ALPRAZolam 0.5 mg oral tablet: 1 tab(s) orally 2 times a day, As Needed  Anoro Ellipta 62.5 mcg-25 mcg/inh inhalation powder: 1 puff(s) inhaled once a day  aspirin 81 mg oral tablet, chewable: 1 tab(s) orally once a day  hydrALAZINE 50 mg oral tablet: 0.5 tab(s) orally every 8 hours   hydroxychloroquine 200 mg oral tablet: 1 tab(s) orally once a day  methylPREDNISolone 2 mg oral tablet: 1 tab(s) orally every other day  metoprolol tartrate 25 mg oral tablet: 1 tab(s) orally 2 times a day   rosuvastatin 5 mg oral tablet: 1 tab(s) orally once a day  Vitamin B12 500 mcg oral tablet: 1 tab(s) orally once a day  Vitamin D3 1000 intl units (25 mcg) oral tablet: 1 tab(s) orally once a day ALPRAZolam 0.5 mg oral tablet: 1 tab(s) orally 2 times a day, As Needed  Anoro Ellipta 62.5 mcg-25 mcg/inh inhalation powder: 1 puff(s) inhaled once a day  aspirin 81 mg oral tablet, chewable: 1 tab(s) orally once a day  hydrALAZINE 50 mg oral tablet: 0.5 tab(s) orally every 8 hours   hydroxychloroquine 200 mg oral tablet: 1 tab(s) orally once a day  methylPREDNISolone 2 mg oral tablet: 1 tab(s) orally every other day  metoprolol tartrate 25 mg oral tablet: 1 tab(s) orally 2 times a day   rosuvastatin 5 mg oral tablet: 1 tab(s) orally once a day  sodium bicarbonate 650 mg oral tablet: 2 tab(s) orally 3 times a day  Vitamin B12 500 mcg oral tablet: 1 tab(s) orally once a day  Vitamin D3 1000 intl units (25 mcg) oral tablet: 1 tab(s) orally once a day ALPRAZolam 0.5 mg oral tablet: 1 tab(s) orally 2 times a day, As Needed  Anoro Ellipta 62.5 mcg-25 mcg/inh inhalation powder: 1 puff(s) inhaled once a day  aspirin 81 mg oral tablet, chewable: 1 tab(s) orally once a day  cefpodoxime 200 mg oral tablet: 1 tab(s) orally every 12 hours end date 6/14 after evening dose   dilTIAZem 240 mg/24 hours oral capsule, extended release: 1 cap(s) orally once a day  hydrALAZINE 50 mg oral tablet: 1 tab(s) orally 3 times a day  hydroxychloroquine 200 mg oral tablet: 1 tab(s) orally once a day  methylPREDNISolone 2 mg oral tablet: 1 tab(s) orally every other day  rosuvastatin 5 mg oral tablet: 1 tab(s) orally once a day  sodium bicarbonate 650 mg oral tablet: 2 tab(s) orally 3 times a day  Vitamin B12 500 mcg oral tablet: 1 tab(s) orally once a day  Vitamin D3 1000 intl units (25 mcg) oral tablet: 1 tab(s) orally once a day ALPRAZolam 0.5 mg oral tablet: 1 tab(s) orally 2 times a day, As Needed  Anoro Ellipta 62.5 mcg-25 mcg/inh inhalation powder: 1 puff(s) inhaled once a day  aspirin 81 mg oral tablet, chewable: 1 tab(s) orally once a day  cefpodoxime 200 mg oral tablet: 1 tab(s) orally every 12 hours end date 6/14 after evening dose   hydrALAZINE 50 mg oral tablet: 1 tab(s) orally 3 times a day  hydroxychloroquine 200 mg oral tablet: 1 tab(s) orally once a day  methylPREDNISolone 2 mg oral tablet: 1 tab(s) orally every other day  metoprolol tartrate 50 mg oral tablet: 1 tab(s) orally 2 times a day  rosuvastatin 5 mg oral tablet: 1 tab(s) orally once a day  sodium bicarbonate 650 mg oral tablet: 2 tab(s) orally 3 times a day  Vitamin B12 500 mcg oral tablet: 1 tab(s) orally once a day  Vitamin D3 1000 intl units (25 mcg) oral tablet: 1 tab(s) orally once a day

## 2020-06-05 NOTE — DISCHARGE NOTE PROVIDER - CARE PROVIDER_API CALL
Abeba Lovelace  INTERNAL MEDICINE  [address]  [place] [address]  Phone: [phone]  Fax: [phone]  Follow Up Time:     Moise Bess  INTERNAL MEDICINE  [address] [place] [address]  Phone: [phone]  Fax: [phone]  Follow Up Time:     primary care,   [facility]  Phone: (   )    -  Fax: (   )    -  Follow Up Time:

## 2020-06-05 NOTE — PHYSICAL THERAPY INITIAL EVALUATION ADULT - GAIT DEVIATIONS NOTED, PT EVAL
decreased weight-shifting ability/left knee buckling during session, PT lowered patient to floor on knees; see plan of care section for detail/decreased hamilton/decreased step length/decreased stride length

## 2020-06-05 NOTE — PHYSICAL THERAPY INITIAL EVALUATION ADULT - STANDING BALANCE: STATIC
Alert and oriented to person, place and time, memory intact, behavior appropriate to situation, PERRL. fair balance

## 2020-06-05 NOTE — PHYSICAL THERAPY INITIAL EVALUATION ADULT - GENERAL OBSERVATIONS, REHAB EVAL
Patient received lying in bed, NAD, breathing RA, +IV infusing, +primafit. Pt agreeable to Physical Therapy evaluation.

## 2020-06-05 NOTE — CONSULT NOTE ADULT - SUBJECTIVE AND OBJECTIVE BOX
[facility], [facility]                              [address] [place] [address]                                                 PHONE: [phone]                                                 FAX: [phone]  ------------------------------------------------------------------------------------------------    70y Female with past medical history as under presents with worsening weakness especially on the left side, lightheadedness and a mechanical fall. Patient states her rheumatologist started her on po steroids and hydroxychloroquine and ever since than has not felt well. Patient denies any NSAID use, fever, chills nausea, vomiting or diarrhea. Patient was worked up in the ER and found to have RAINE with CR greater than 3 and was also hypotensive. At the time of evaluation, pt reports feeling well.     PAST MEDICAL & SURGICAL HISTORY:  Lung cancer  Atrial fibrillation  Endometriosis  Hypertension  Fibroid tumor  History of ovarian cyst    No Known Allergies    Review of Systems:  Positive for fatigue, weakness  Rest of the systems were reviewed and was negative.     Family history:   No pertinent family history in first degree relative of early CAD, sudden cardiac death or  congenital heart disease    Social History:  No smoking   No alcohol  No other drug use    Vital Signs Last 24 Hrs  T(C): 36.7 (05 Jun 2020 11:22), Max: 36.7 (05 Jun 2020 11:22)  T(F): 98 (05 Jun 2020 11:22), Max: 98 (05 Jun 2020 11:22)  HR: 79 (05 Jun 2020 11:22) (62 - 79)  BP: 126/69 (05 Jun 2020 11:22) (78/40 - 134/77)  BP(mean): --  RR: 18 (05 Jun 2020 11:22) (18 - 18)  SpO2: 96% (05 Jun 2020 11:22) (96% - 98%)    PHYSICAL EXAM:  Constitutional: Obese woman in bed in no distress  HEENT:     Head: Normocephalic and atraumatic  Eyes: Conjunctiva normal, No scleral icterus  Neck: Supple, No JVD  Mouth/Throat: Mucous membranes are normal. Mucosa are not pale or dry.  Cardiovascular: regular S1, S2 + ESM  Respiratory: Lungs clear to auscultation; no crepitations, no wheeze  Gastrointestinal:  Soft, Non-tender, + BS	  Musculoskeletal: Normal range of motion. No edema  Skin: Warm and dry. No cyanosis . No diaphoresis.  Neurologic: Alert oriented to time place and person. Normal strength and no tremor.  Psychiatric: Normal mood and affect, Speech is normal and behavior is normal.        LABS:                        12.3   12.47 )-----------( 209      ( 05 Jun 2020 07:31 )             37.9     06-05    141  |  107  |  62.0<H>  ----------------------------<  69<L>  4.1   |  16.0<L>  |  1.40<H>    Ca    8.7      05 Jun 2020 07:31  Phos  3.0     06-05  Mg     2.6     06-05    TPro  5.4<L>  /  Alb  3.0<L>  /  TBili  <0.2<L>  /  DBili  x   /  AST  16  /  ALT  11  /  AlkPhos  55  06-05    CARDIAC MARKERS ( 04 Jun 2020 22:19 )  x     / x     / 107 U/L / x     / x      CARDIAC MARKERS ( 04 Jun 2020 13:41 )  x     / 0.05 ng/mL / x     / x     / x          RADIOLOGY & ADDITIONAL STUDIES: (reviewed)  CXR was independently reviewed and demonstrated: clear lungs with prior lobectomy    CARDIOLOGY TESTING:(reviewed)     ECG (Independent visualization): NSR with t wave inversions in the inferior leads    ECHOCARDIOGRAM :  < from: TTE Echo Complete w/Doppler (05.14.18 @ 10:05) >  Summary:   1. Left ventricular ejection fraction, by visual estimation, is 55 to   60%.   2. Normal global left ventricular systolic function.   3. The mitral in-flow pattern reveals no discernable A-wave, therefore   no comment on diastolic function can be made.   4. There is no evidence of pericardial effusion.   5. Degenerative mitral valve.   6. Moderate to severe mitral annular calcification.   7. Thickening and calcification of the posterior mitral valve leaflet.   8. Moderate tricuspid regurgitation.   9. Sclerotic aortic valve with normal opening.    [id number] Laura Avery MD, Electronically signed on 5/14/2018 at   11:18:40 AM    < end of copied text >    MEDICATIONS:(reviewed)  Home Medications:  Home Medications:  ALPRAZolam 0.5 mg oral tablet: 1 tab(s) orally 2 times a day, As Needed (04 Jun 2020 17:28)  Anoro Ellipta 62.5 mcg-25 mcg/inh inhalation powder: 1 puff(s) inhaled once a day (04 Jun 2020 17:28)  aspirin 81 mg oral tablet, chewable: 1 tab(s) orally once a day (04 Jun 2020 15:52)  hydroxychloroquine 200 mg oral tablet: 1 tab(s) orally once a day (04 Jun 2020 17:28)  methylPREDNISolone 2 mg oral tablet: 1 tab(s) orally every other day (04 Jun 2020 17:28)  rosuvastatin 5 mg oral tablet: 1 tab(s) orally once a day (04 Jun 2020 17:28)  Vitamin B12 500 mcg oral tablet: 1 tab(s) orally once a day (04 Jun 2020 17:28)  Vitamin D3 1000 intl units (25 mcg) oral tablet: 1 tab(s) orally once a day (04 Jun 2020 17:28)      MEDICATIONS  (STANDING):  aspirin  chewable 81 milliGRAM(s) Oral daily  atorvastatin 20 milliGRAM(s) Oral at bedtime  cholecalciferol 1000 Unit(s) Oral daily  enoxaparin Injectable 30 milliGRAM(s) SubCutaneous daily  lactated ringers. 1000 milliLiter(s) (100 mL/Hr) IV Continuous <Continuous>  methylPREDNISolone 2 milliGRAM(s) Oral every other day  metoprolol tartrate 25 milliGRAM(s) Oral at bedtime  metoprolol tartrate 50 milliGRAM(s) Oral daily  sodium bicarbonate 1300 milliGRAM(s) Oral three times a day    MEDICATIONS  (PRN):  ALPRAZolam 0.5 milliGRAM(s) Oral two times a day PRN anxiwty

## 2020-06-05 NOTE — PROGRESS NOTE ADULT - SUBJECTIVE AND OBJECTIVE BOX
MELISSA BRADFORD    [id number]    70y      Female    INTERVAL HPI/OVERNIGHT EVENTS: patient being seen for RAINE and dehydration. patient seen at bedside and denies any complaints    patient was working with pt and had a mechanical fall. Patient is now agreeable to [facility].     REVIEW OF SYSTEMS:    CONSTITUTIONAL: fatigue  RESPIRATORY: No cough, wheezing, hemoptysis; No shortness of breath  CARDIOVASCULAR: No chest pain, palpitations  GASTROINTESTINAL: No abdominal or epigastric pain. No nausea, vomiting  NEUROLOGICAL: No headaches, memory loss, loss of strength.  MISCELLANEOUS:      Vital Signs Last 24 Hrs  T(C): 36.7 (2020 11:22), Max: 36.7 (2020 11:22)  T(F): 98 (2020 11:22), Max: 98 (2020 11:22)  HR: 79 (2020 11:22) (62 - 79)  BP: 126/69 (2020 11:22) (78/40 - 134/77)  BP(mean): --  RR: 18 (2020 11:22) (18 - 18)  SpO2: 96% (2020 11:22) (93% - 98%)    PHYSICAL EXAM:    	GENERAL: NAD, obese, pleasant   	HEAD:  Atraumatic, Normocephalic  	EYES: EOMI, PERRLA, conjunctiva and sclera clear  	NECK: Supple, No JVD, Normal thyroid  	NERVOUS SYSTEM:  Alert & Oriented X3, Good concentration; Motor Strength 5/5 B/L upper and lower extremities;  	CHEST/LUNG: diminished secondary to body habitus  	HEART: Regular rate and rhythm; No murmurs, rubs, or gallops  	ABDOMEN: Soft, Nontender, Nondistended; Bowel sounds present  	EXTREMITIES:  2+ Peripheral Pulses, No edema   	LYMPH: No lymphadenopathy noted  SKIN: No rashes or lesions      LABS:                        12.3   12.47 )-----------( 209      ( 2020 07:31 )             37.9     06-05    141  |  107  |  62.0<H>  ----------------------------<  69<L>  4.1   |  16.0<L>  |  1.40<H>    Ca    8.7      2020 07:31  Phos  3.0     06-05  Mg     2.6     06-05    TPro  5.4<L>  /  Alb  3.0<L>  /  TBili  <0.2<L>  /  DBili  x   /  AST  16  /  ALT  11  /  AlkPhos  55  06-05      Urinalysis Basic - ( 2020 08:11 )    Color: Yellow / Appearance: Clear / S.015 / pH: x  Gluc: x / Ketone: Moderate  / Bili: Negative / Urobili: Negative   Blood: x / Protein: 15 / Nitrite: Positive   Leuk Esterase: Small / RBC: 0-2 /HPF / WBC 3-5   Sq Epi: x / Non Sq Epi: Occasional / Bacteria: Occasional          MEDICATIONS  (STANDING):  aspirin  chewable 81 milliGRAM(s) Oral daily  atorvastatin 20 milliGRAM(s) Oral at bedtime  cholecalciferol 1000 Unit(s) Oral daily  enoxaparin Injectable 30 milliGRAM(s) SubCutaneous daily  lactated ringers. 1000 milliLiter(s) (100 mL/Hr) IV Continuous <Continuous>  methylPREDNISolone 2 milliGRAM(s) Oral every other day  metoprolol tartrate 25 milliGRAM(s) Oral at bedtime  metoprolol tartrate 50 milliGRAM(s) Oral daily  sodium bicarbonate 1300 milliGRAM(s) Oral three times a day    MEDICATIONS  (PRN):  ALPRAZolam 0.5 milliGRAM(s) Oral two times a day PRN anxiwty      RADIOLOGY & ADDITIONAL TESTS:

## 2020-06-05 NOTE — DISCHARGE NOTE PROVIDER - CARE PROVIDERS DIRECT ADDRESSES
,[email or web address],DirectAddress_Unknown,DirectAddress_Unknown

## 2020-06-05 NOTE — PHYSICAL THERAPY INITIAL EVALUATION ADULT - ADDITIONAL COMMENTS
Patient lives in a house with 1 PAOLO and 13 stairs inside (+) railing. She lives alone, was independent prior to admission using a RW for ambulation. She has only a RW at home.

## 2020-06-05 NOTE — PHYSICAL THERAPY INITIAL EVALUATION ADULT - PERTINENT HX OF CURRENT PROBLEM, REHAB EVAL
Patient is a 71 y/o female with pmh of lung CA, copd not on home o2, CVA with left sided residual weakness, htn and rheumatoid arthritis presents with worsening weakness especially on the left side, lightheadedness and a mechanical fall

## 2020-06-05 NOTE — DISCHARGE NOTE PROVIDER - NSDCCPCAREPLAN_GEN_ALL_CORE_FT
PRINCIPAL DISCHARGE DIAGNOSIS  Diagnosis: Acute kidney failure  Assessment and Plan of Treatment:       SECONDARY DISCHARGE DIAGNOSES  Diagnosis: Dehydration  Assessment and Plan of Treatment:     Diagnosis: Hypotension  Assessment and Plan of Treatment: PRINCIPAL DISCHARGE DIAGNOSIS  Diagnosis: Acute kidney failure  Assessment and Plan of Treatment: resolved , repeat labs in 1 week      SECONDARY DISCHARGE DIAGNOSES  Diagnosis: UTI due to Klebsiella species  Assessment and Plan of Treatment: complete antibiotics course end 6/14 evening       Diagnosis: Dehydration  Assessment and Plan of Treatment: resolved , encouarge oral fludi intake    Diagnosis: Hypotension  Assessment and Plan of Treatment: resolved

## 2020-06-06 LAB
ALBUMIN SERPL ELPH-MCNC: 3.1 G/DL — LOW (ref 3.3–5.2)
ALP SERPL-CCNC: 54 U/L — SIGNIFICANT CHANGE UP (ref 40–120)
ALT FLD-CCNC: 11 U/L — SIGNIFICANT CHANGE UP
ANION GAP SERPL CALC-SCNC: 12 MMOL/L — SIGNIFICANT CHANGE UP (ref 5–17)
AST SERPL-CCNC: 17 U/L — SIGNIFICANT CHANGE UP
BILIRUB SERPL-MCNC: 0.3 MG/DL — LOW (ref 0.4–2)
BUN SERPL-MCNC: 42 MG/DL — HIGH (ref 8–20)
CALCIUM SERPL-MCNC: 8.8 MG/DL — SIGNIFICANT CHANGE UP (ref 8.6–10.2)
CHLORIDE SERPL-SCNC: 111 MMOL/L — HIGH (ref 98–107)
CO2 SERPL-SCNC: 21 MMOL/L — LOW (ref 22–29)
CREAT SERPL-MCNC: 1.02 MG/DL — SIGNIFICANT CHANGE UP (ref 0.5–1.3)
GLUCOSE SERPL-MCNC: 93 MG/DL — SIGNIFICANT CHANGE UP (ref 70–99)
MAGNESIUM SERPL-MCNC: 2.3 MG/DL — SIGNIFICANT CHANGE UP (ref 1.6–2.6)
POTASSIUM SERPL-MCNC: 4.5 MMOL/L — SIGNIFICANT CHANGE UP (ref 3.5–5.3)
POTASSIUM SERPL-SCNC: 4.5 MMOL/L — SIGNIFICANT CHANGE UP (ref 3.5–5.3)
PROT SERPL-MCNC: 5.2 G/DL — LOW (ref 6.6–8.7)
SODIUM SERPL-SCNC: 144 MMOL/L — SIGNIFICANT CHANGE UP (ref 135–145)

## 2020-06-06 PROCEDURE — 99232 SBSQ HOSP IP/OBS MODERATE 35: CPT

## 2020-06-06 RX ORDER — ENOXAPARIN SODIUM 100 MG/ML
40 INJECTION SUBCUTANEOUS DAILY
Refills: 0 | Status: DISCONTINUED | OUTPATIENT
Start: 2020-06-06 | End: 2020-06-11

## 2020-06-06 RX ADMIN — ATORVASTATIN CALCIUM 20 MILLIGRAM(S): 80 TABLET, FILM COATED ORAL at 21:34

## 2020-06-06 RX ADMIN — Medication 1300 MILLIGRAM(S): at 21:34

## 2020-06-06 RX ADMIN — Medication 81 MILLIGRAM(S): at 11:37

## 2020-06-06 RX ADMIN — Medication 1000 UNIT(S): at 11:37

## 2020-06-06 RX ADMIN — ENOXAPARIN SODIUM 40 MILLIGRAM(S): 100 INJECTION SUBCUTANEOUS at 11:37

## 2020-06-06 RX ADMIN — Medication 50 MILLIGRAM(S): at 06:14

## 2020-06-06 RX ADMIN — Medication 25 MILLIGRAM(S): at 21:35

## 2020-06-06 RX ADMIN — Medication 1300 MILLIGRAM(S): at 06:14

## 2020-06-06 RX ADMIN — Medication 1300 MILLIGRAM(S): at 11:38

## 2020-06-06 RX ADMIN — Medication 0.5 MILLIGRAM(S): at 22:31

## 2020-06-06 NOTE — PROGRESS NOTE ADULT - SUBJECTIVE AND OBJECTIVE BOX
[facility] located [facility], [facility]                              [address] [address]                                                 PHONE: [phone]                                                 FAX: [phone]  ------------------------------------------------------------------------------------------------    70y Female with past medical history as under presents with worsening weakness especially on the left side, lightheadedness and a mechanical fall. Patient states her rheumatologist started her on po steroids and hydroxychloroquine and ever since than has not felt well. Patient denies any NSAID use, fever, chills nausea, vomiting or diarrhea. Patient was worked up in the ER and found to have RAINE with CR greater than 3 and was also hypotensive. At the time of evaluation, pt reports feeling well.     < from: TTE Echo Complete w/o Contrast w/ Doppler (06.05.20 @ 16:42) >  Summary:   1. Left ventricular ejection fraction, by visual estimation, is >75%.   2. Hyperdynamic global left ventricular systolic function.   3. Normal left ventricular internal cavity size.   4. Spectral Doppler shows impaired relaxation pattern of left ventricular myocardial filling (Grade I diastolic dysfunction).   5. Mild mitral valve regurgitation.   6. Mild to moderate mitral annular calcification.   7. Thickening and calcification of the posterior mitral valve leaflet.   8. Peak transaortic gradient equals 9.4 mmHg, mean transaortic gradient equals 5.0 mmHg, the calculated aortic valve area equals 2.16 cm² by the continuity equation consistent with mild aortic stenos    < end of copied text >      PAST MEDICAL & SURGICAL HISTORY:  Lung cancer  Atrial fibrillation  Endometriosis  Hypertension  Fibroid tumor  History of ovarian cyst    No Known Allergies    Review of Systems:  Positive for fatigue, weakness  Rest of the systems were reviewed and was negative.     Family history:   No pertinent family history in first degree relative of early CAD, sudden cardiac death or  congenital heart disease    Social History:  No smoking   No alcohol  No other drug use    Vital Signs Last 24 Hrs  T(C): 36.7 (05 Jun 2020 11:22), Max: 36.7 (05 Jun 2020 11:22)  T(F): 98 (05 Jun 2020 11:22), Max: 98 (05 Jun 2020 11:22)  HR: 79 (05 Jun 2020 11:22) (62 - 79)  BP: 126/69 (05 Jun 2020 11:22) (78/40 - 134/77)  BP(mean): --  RR: 18 (05 Jun 2020 11:22) (18 - 18)  SpO2: 96% (05 Jun 2020 11:22) (96% - 98%)    PHYSICAL EXAM:  Constitutional: Obese woman in bed in no distress  HEENT:     Head: Normocephalic and atraumatic  Eyes: Conjunctiva normal, No scleral icterus  Neck: Supple, No JVD  Mouth/Throat: Mucous membranes are normal. Mucosa are not pale or dry.  Cardiovascular: regular S1, S2 + ESM  Respiratory: Lungs clear to auscultation; no crepitations, no wheeze  Gastrointestinal:  Soft, Non-tender, + BS	  Musculoskeletal: Normal range of motion. No edema  Skin: Warm and dry. No cyanosis . No diaphoresis.  Neurologic: Alert oriented to time place and person. Normal strength and no tremor.  Psychiatric: Normal mood and affect, Speech is normal and behavior is normal.        LABS:                        12.3   12.47 )-----------( 209      ( 05 Jun 2020 07:31 )             37.9     06-05    141  |  107  |  62.0<H>  ----------------------------<  69<L>  4.1   |  16.0<L>  |  1.40<H>    Ca    8.7      05 Jun 2020 07:31  Phos  3.0     06-05  Mg     2.6     06-05    TPro  5.4<L>  /  Alb  3.0<L>  /  TBili  <0.2<L>  /  DBili  x   /  AST  16  /  ALT  11  /  AlkPhos  55  06-05    CARDIAC MARKERS ( 04 Jun 2020 22:19 )  x     / x     / 107 U/L / x     / x      CARDIAC MARKERS ( 04 Jun 2020 13:41 )  x     / 0.05 ng/mL / x     / x     / x          RADIOLOGY & ADDITIONAL STUDIES: (reviewed)  CXR was independently reviewed and demonstrated: clear lungs with prior lobectomy    CARDIOLOGY TESTING:(reviewed)     ECG (Independent visualization): NSR with t wave inversions in the inferior leads    ECHOCARDIOGRAM :  < from: TTE Echo Complete w/Doppler (05.14.18 @ 10:05) >  Summary:   1. Left ventricular ejection fraction, by visual estimation, is 55 to   60%.   2. Normal global left ventricular systolic function.   3. The mitral in-flow pattern reveals no discernable A-wave, therefore   no comment on diastolic function can be made.   4. There is no evidence of pericardial effusion.   5. Degenerative mitral valve.   6. Moderate to severe mitral annular calcification.   7. Thickening and calcification of the posterior mitral valve leaflet.   8. Moderate tricuspid regurgitation.   9. Sclerotic aortic valve with normal opening.    [id number] Laura Avery MD, Electronically signed on 5/14/2018 at   11:18:40 AM    < end of copied text >    MEDICATIONS:(reviewed)  Home Medications:  Home Medications:  ALPRAZolam 0.5 mg oral tablet: 1 tab(s) orally 2 times a day, As Needed (04 Jun 2020 17:28)  Anoro Ellipta 62.5 mcg-25 mcg/inh inhalation powder: 1 puff(s) inhaled once a day (04 Jun 2020 17:28)  aspirin 81 mg oral tablet, chewable: 1 tab(s) orally once a day (04 Jun 2020 15:52)  hydroxychloroquine 200 mg oral tablet: 1 tab(s) orally once a day (04 Jun 2020 17:28)  methylPREDNISolone 2 mg oral tablet: 1 tab(s) orally every other day (04 Jun 2020 17:28)  rosuvastatin 5 mg oral tablet: 1 tab(s) orally once a day (04 Jun 2020 17:28)  Vitamin B12 500 mcg oral tablet: 1 tab(s) orally once a day (04 Jun 2020 17:28)  Vitamin D3 1000 intl units (25 mcg) oral tablet: 1 tab(s) orally once a day (04 Jun 2020 17:28)      MEDICATIONS  (STANDING):  aspirin  chewable 81 milliGRAM(s) Oral daily  atorvastatin 20 milliGRAM(s) Oral at bedtime  cholecalciferol 1000 Unit(s) Oral daily  enoxaparin Injectable 30 milliGRAM(s) SubCutaneous daily  lactated ringers. 1000 milliLiter(s) (100 mL/Hr) IV Continuous <Continuous>  methylPREDNISolone 2 milliGRAM(s) Oral every other day  metoprolol tartrate 25 milliGRAM(s) Oral at bedtime  metoprolol tartrate 50 milliGRAM(s) Oral daily  sodium bicarbonate 1300 milliGRAM(s) Oral three times a day    MEDICATIONS  (PRN):  ALPRAZolam 0.5 milliGRAM(s) Oral two times a day PRN anxiwty

## 2020-06-06 NOTE — PROGRESS NOTE ADULT - SUBJECTIVE AND OBJECTIVE BOX
NEPHROLOGY INTERVAL HPI/OVERNIGHT EVENTS:    feels better   interm noted     MEDICATIONS  (STANDING):  aspirin  chewable 81 milliGRAM(s) Oral daily  atorvastatin 20 milliGRAM(s) Oral at bedtime  cholecalciferol 1000 Unit(s) Oral daily  enoxaparin Injectable 40 milliGRAM(s) SubCutaneous daily  lactated ringers. 1000 milliLiter(s) (100 mL/Hr) IV Continuous <Continuous>  methylPREDNISolone 2 milliGRAM(s) Oral every other day  metoprolol tartrate 25 milliGRAM(s) Oral at bedtime  metoprolol tartrate 50 milliGRAM(s) Oral daily  sodium bicarbonate 1300 milliGRAM(s) Oral three times a day    MEDICATIONS  (PRN):  ALPRAZolam 0.5 milliGRAM(s) Oral two times a day PRN anxiwty      Allergies    No Known Allergies    Intolerances      Vital Signs Last 24 Hrs  T(C): 36.9 (2020 08:16), Max: 37.2 (2020 19:45)  T(F): 98.4 (2020 08:16), Max: 98.9 (2020 19:45)  HR: 84 (2020 08:16) (82 - 100)  BP: 154/82 (2020 08:16) (120/77 - 154/82)  BP(mean): --  RR: 20 (2020 08:16) (18 - 20)  SpO2: 97% (2020 01:35) (96% - 98%)  Daily     Daily   I&O's Detail    I&O's Summary      PHYSICAL EXAM:  GENERAL: appears chronically ill  HEAD:  Atraumatic, Normocephalic  EYES: EOMI  NECK: Supple, neck  veins full  NERVOUS SYSTEM:  Alert & Oriented X3  CHEST/LUNG: Clear to percussion bilaterally  HEART: Regular rate and rhythm  ABDOMEN: Soft, Nontender, Nondistended; Bowel sounds present  EXTREMITIES: Trace LE edema      LABS:                        12.3   12.47 )-----------( 209      ( 2020 07:31 )             37.9     06-06    144  |  111<H>  |  42.0<H>  ----------------------------<  93  4.5   |  21.0<L>  |  1.02    Ca    8.8      2020 06:07  Phos  3.0     06-05  Mg     2.3     06-06    TPro  5.2<L>  /  Alb  3.1<L>  /  TBili  0.3<L>  /  DBili  x   /  AST  17  /  ALT  11  /  AlkPhos  54  06-      Urinalysis Basic - ( 2020 08:11 )    Color: Yellow / Appearance: Clear / S.015 / pH: x  Gluc: x / Ketone: Moderate  / Bili: Negative / Urobili: Negative   Blood: x / Protein: 15 / Nitrite: Positive   Leuk Esterase: Small / RBC: 0-2 /HPF / WBC 3-5   Sq Epi: x / Non Sq Epi: Occasional / Bacteria: Occasional      Magnesium, Serum: 2.3 mg/dL ( @ 06:07)             EXAM:  CT ABDOMEN AND PELVIS IC                         EXAM:  CT ANGIO CHEST (W)AW IC                          PROCEDURE DATE:  2020          INTERPRETATION:  CT ANGIO CHEST WITHOUT AND OR WITH IV CONTRAST, CT ABDOMEN AND PELVIS WITH IV CONTRAST    CLINICAL INFORMATION: hypoxia, hx copd, status post fall trauma, hypotension, back pain      COMPARISON: Chest CT 3/6/2020, CT abdomen and pelvis 2018    PROCEDURE:   CT Angiography of the Chest was performed followed by portal venous phase imaging of the Abdomen and Pelvis.  Intravenous contrast: 95 cc Omnipaque 350  Oral contrast: None.  Sagittal and coronal reformats were performed as well as 3D (MIP) reconstructions.    FINDINGS:    CHEST:    PULMONARY ARTERIES: No pulmonary embolism.    LUNGS, AIRWAYS: The central airways are patent. Status post right lower lobectomy with stable appearance of the suture line. Bilateral upper lung groundglass and nodular opacities persist and are indeterminate. Developing more solid appearingnodules at the left apex and left lower lobe superior segment measure 1.6 cm and 0.9 cm, respectively (4; 24, 61).    PLEURA: No pleural abnormality.  VESSELS: Aortic atherosclerosis without aneurysm.  HEART: Normal heart size. No pericardial effusion. Coronary artery calcifications are present.  MEDIASTINUM AND CIRO: No adenopathy.  CHEST WALL: No masses.    ABDOMEN AND PELVIS:    LIVER: No hematoma or laceration.  BILE DUCTS: Nondilated.  GALLBLADDER: Normal.  SPLEEN: No hematoma or laceration.  PANCREAS: Stable 7 mm cyst at the tail without main duct dilatation.  ADRENALS: Stable small left-sided nodules.  KIDNEYS/URETERS: No hydronephrosis or urinary tract calculi on the left. Stable right kidney atrophy.    BLADDER: Normal.  REPRODUCTIVEORGANS: Fibroid uterus.    BOWEL: No bowel-related abnormality. Specifically, no evidence of acute diverticulitis. Normal appendix and ileocecal region. No bowel obstruction or bowel inflammation.  PERITONEUM: No free air or ascites.  VESSELS:  Aortoiliac atherosclerosis without aneurysm.  RETROPERITONEUM/LYMPH NODES: No adenopathy.    ABDOMINAL WALL: No hematoma or contusion.  BONES: No acute skeletal trauma. No displaced rib fracture. No thoracolumbar spine compression fracture or traumatic malalignment.    IMPRESSION:     No acute traumatic injury.    No pulmonary embolism.    Bilateral upper lung groundglass and nodular opacities persist and are indeterminate.     Developing more solid appearing nodules at the left apex and left lower lobe superior segment measure 1.6 cm and 0.9 cm, respectively (4; 24, 61). Malignancy cannot be excluded. Dedicated thoracic oncology follow-up is advised.                  MERA SAAB M.D., ATTENDING RADIOLOGIST  This document has been electronically signed. 2020  4:41PM

## 2020-06-06 NOTE — PROGRESS NOTE ADULT - SUBJECTIVE AND OBJECTIVE BOX
MELISSA BRADFORD    [id number]    70y      Female    follow up for ARF . weakness   pt is resting in the bed , feeling stronger but still weak in the legs   apparently fell while walking yesterday and now agrees to go to [facility]       Vital Signs Last 24 Hrs  T(C): 36.9 (2020 08:16), Max: 37.2 (2020 19:45)  T(F): 98.4 (2020 08:16), Max: 98.9 (2020 19:45)  HR: 84 (2020 08:16) (79 - 100)  BP: 154/82 (2020 08:16) (120/77 - 154/82)  BP(mean): --  RR: 20 (2020 08:16) (18 - 20)  SpO2: 97% (2020 01:35) (96% - 98%)  PHYSICAL EXAM:    	GENERAL: NAD, lying in the bed   	NECK: Supple, No JVD, Normal thyroid  	NERVOUS SYSTEM:  Alert & Oriented X3, Good concentration; Motor Strength 5/5 B/L upper and lower extremities;  	CHEST/LUNG: diminished secondary to body habitus  	HEART: Regular rate and rhythm; No murmurs, rubs, or gallops  	ABDOMEN: Soft, Nontender, Nondistended; Bowel sounds present  	EXTREMITIES:  2+ Peripheral Pulses, No edema   generalized weakness                           12.3   12.47 )-----------( 209      ( 2020 07:31 )             37.9   06-06    144  |  111<H>  |  42.0<H>  ----------------------------<  93  4.5   |  21.0<L>  |  1.02    Ca    8.8      2020 06:07  Phos  3.0     06-05  Mg     2.3     06-06    TPro  5.2<L>  /  Alb  3.1<L>  /  TBili  0.3<L>  /  DBili  x   /  AST  17  /  ALT  11  /  AlkPhos  54  06-06    	  LABS:                        12.3   12.47 )-----------( 209      ( 2020 07:31 )             37.9     06-05    141  |  107  |  62.0<H>  ----------------------------<  69<L>  4.1   |  16.0<L>  |  1.40<H>    Ca    8.7      2020 07:31  Phos  3.0     06-  Mg     2.6     06-    TPro  5.4<L>  /  Alb  3.0<L>  /  TBili  <0.2<L>  /  DBili  x   /  AST  16  /  ALT  11  /  AlkPhos  55  06-05      Urinalysis Basic - ( 2020 08:11 )    Color: Yellow / Appearance: Clear / S.015 / pH: x  Gluc: x / Ketone: Moderate  / Bili: Negative / Urobili: Negative   Blood: x / Protein: 15 / Nitrite: Positive   Leuk Esterase: Small / RBC: 0-2 /HPF / WBC 3-5   Sq Epi: x / Non Sq Epi: Occasional / Bacteria: Occasional

## 2020-06-07 PROCEDURE — 99232 SBSQ HOSP IP/OBS MODERATE 35: CPT

## 2020-06-07 RX ORDER — ALPRAZOLAM 0.25 MG
0.25 TABLET ORAL ONCE
Refills: 0 | Status: DISCONTINUED | OUTPATIENT
Start: 2020-06-07 | End: 2020-06-07

## 2020-06-07 RX ADMIN — Medication 2 MILLIGRAM(S): at 11:43

## 2020-06-07 RX ADMIN — Medication 25 MILLIGRAM(S): at 21:35

## 2020-06-07 RX ADMIN — ENOXAPARIN SODIUM 40 MILLIGRAM(S): 100 INJECTION SUBCUTANEOUS at 11:42

## 2020-06-07 RX ADMIN — Medication 1000 UNIT(S): at 11:43

## 2020-06-07 RX ADMIN — Medication 81 MILLIGRAM(S): at 11:43

## 2020-06-07 RX ADMIN — Medication 50 MILLIGRAM(S): at 06:08

## 2020-06-07 RX ADMIN — Medication 1300 MILLIGRAM(S): at 21:35

## 2020-06-07 RX ADMIN — ATORVASTATIN CALCIUM 20 MILLIGRAM(S): 80 TABLET, FILM COATED ORAL at 21:35

## 2020-06-07 RX ADMIN — Medication 0.5 MILLIGRAM(S): at 21:35

## 2020-06-07 RX ADMIN — Medication 1300 MILLIGRAM(S): at 06:08

## 2020-06-07 RX ADMIN — Medication 1300 MILLIGRAM(S): at 13:37

## 2020-06-07 RX ADMIN — Medication 0.5 MILLIGRAM(S): at 09:27

## 2020-06-07 NOTE — PROGRESS NOTE ADULT - SUBJECTIVE AND OBJECTIVE BOX
MELISSA BRADFORD    [id number]    70y      Female    follow up for ARF . leg weakness   pt is refusing MR of brain , due to severe anxiety and can not stay un thE MR machine   despite telling her can we premedicated she adamantly refusing   d/w sister on the phone as well per pt 's request permission      Vital Signs Last 24 Hrs  T(C): 36.9 (07 Jun 2020 08:11), Max: 36.9 (07 Jun 2020 08:11)  T(F): 98.5 (07 Jun 2020 08:11), Max: 98.5 (07 Jun 2020 08:11)  HR: 75 (07 Jun 2020 08:11) (75 - 92)  BP: 159/94 (07 Jun 2020 08:11) (131/68 - 159/94)  BP(mean): --  RR: 18 (07 Jun 2020 08:11) (16 - 18)  SpO2: 97% (06 Jun 2020 23:53) (97% - 97%)PHYSICAL EXAM:    	GENERAL: NAD, lying in the bed   	NERVOUS SYSTEM:  Alert & Oriented X3, Good concentration; Motor Strength 5/5 B/L upper and lower extremities  	CHEST/LUNG: diminished BS otherwise CTA   	HEART: Regular rate and rhythm; s1/s2 No murmurs, rubs, or gallops  	ABDOMEN: Soft, Nontender, Nondistended; Bowel sounds present  	EXTREMITIES:  No edema     06-06    144  |  111<H>  |  42.0<H>  ----------------------------<  93  4.5   |  21.0<L>  |  1.02    Ca    8.8      06 Jun 2020 06:07  Mg     2.3     06-06    TPro  5.2<L>  /  Alb  3.1<L>  /  TBili  0.3<L>  /  DBili  x   /  AST  17  /  ALT  11  /  AlkPhos  54  06-06     < from: CT Thoracic Spine Reform No Cont (06.04.20 @ 15:51) >  IMPRESSION: No acute fracture dislocation involving the thoracic or lumbar spine. Consider MRI as clinically. Additional findings above.      < end of copied text >  < from: CT Head No Cont (06.04.20 @ 15:38) >  ESSION:  Mild chronic microvascular changes without evidence of an acute transcortical infarction or hemorrhage. MR is a more sensitive imaging modality for the evaluation of an acute infarction.       < end of copied text >

## 2020-06-07 NOTE — PROGRESS NOTE ADULT - SUBJECTIVE AND OBJECTIVE BOX
NEPHROLOGY INTERVAL HPI/OVERNIGHT EVENTS:    Examined  Laying in bed looks comfortable  No distress    MEDICATIONS  (STANDING):  ALPRAZolam 0.25 milliGRAM(s) Oral once  aspirin  chewable 81 milliGRAM(s) Oral daily  atorvastatin 20 milliGRAM(s) Oral at bedtime  cholecalciferol 1000 Unit(s) Oral daily  enoxaparin Injectable 40 milliGRAM(s) SubCutaneous daily  lactated ringers. 1000 milliLiter(s) (100 mL/Hr) IV Continuous <Continuous>  methylPREDNISolone 2 milliGRAM(s) Oral every other day  metoprolol tartrate 25 milliGRAM(s) Oral at bedtime  metoprolol tartrate 50 milliGRAM(s) Oral daily  sodium bicarbonate 1300 milliGRAM(s) Oral three times a day    MEDICATIONS  (PRN):  ALPRAZolam 0.5 milliGRAM(s) Oral two times a day PRN anxiwty      Allergies    No Known Allergies    Intolerances        Vital Signs Last 24 Hrs  T(C): 36.9 (07 Jun 2020 08:11), Max: 36.9 (07 Jun 2020 08:11)  T(F): 98.5 (07 Jun 2020 08:11), Max: 98.5 (07 Jun 2020 08:11)  HR: 75 (07 Jun 2020 08:11) (75 - 92)  BP: 159/94 (07 Jun 2020 08:11) (131/68 - 159/94)  BP(mean): --  RR: 18 (07 Jun 2020 08:11) (16 - 18)  SpO2: 97% (06 Jun 2020 23:53) (97% - 97%)  Daily     Daily     PHYSICAL EXAM:  GENERAL: appears chronically ill  HEAD:  Atraumatic, Normocephalic  EYES: EOMI  NECK: Supple, neck  veins full  NERVOUS SYSTEM:  Alert & Oriented X3  CHEST/LUNG: Clear to percussion bilaterally  HEART: Regular rate and rhythm  ABDOMEN: Soft, Nontender, Nondistended; Bowel sounds present  EXTREMITIES: Trace LE edema    LABS:    06-06    144  |  111<H>  |  42.0<H>  ----------------------------<  93  4.5   |  21.0<L>  |  1.02    Ca    8.8      06 Jun 2020 06:07  Mg     2.3     06-06    TPro  5.2<L>  /  Alb  3.1<L>  /  TBili  0.3<L>  /  DBili  x   /  AST  17  /  ALT  11  /  AlkPhos  54  06-06                RADIOLOGY & ADDITIONAL TESTS:

## 2020-06-08 LAB
24R-OH-CALCIDIOL SERPL-MCNC: 52.3 NG/ML — SIGNIFICANT CHANGE UP (ref 30–80)
ANION GAP SERPL CALC-SCNC: 12 MMOL/L — SIGNIFICANT CHANGE UP (ref 5–17)
BUN SERPL-MCNC: 25 MG/DL — HIGH (ref 8–20)
CALCIUM SERPL-MCNC: 8.9 MG/DL — SIGNIFICANT CHANGE UP (ref 8.6–10.2)
CHLORIDE SERPL-SCNC: 105 MMOL/L — SIGNIFICANT CHANGE UP (ref 98–107)
CO2 SERPL-SCNC: 26 MMOL/L — SIGNIFICANT CHANGE UP (ref 22–29)
CREAT SERPL-MCNC: 0.79 MG/DL — SIGNIFICANT CHANGE UP (ref 0.5–1.3)
GLUCOSE SERPL-MCNC: 91 MG/DL — SIGNIFICANT CHANGE UP (ref 70–99)
HCT VFR BLD CALC: 36.6 % — SIGNIFICANT CHANGE UP (ref 34.5–45)
HGB BLD-MCNC: 11.8 G/DL — SIGNIFICANT CHANGE UP (ref 11.5–15.5)
MCHC RBC-ENTMCNC: 31.1 PG — SIGNIFICANT CHANGE UP (ref 27–34)
MCHC RBC-ENTMCNC: 32.2 GM/DL — SIGNIFICANT CHANGE UP (ref 32–36)
MCV RBC AUTO: 96.3 FL — SIGNIFICANT CHANGE UP (ref 80–100)
PHOSPHATE SERPL-MCNC: 2.3 MG/DL — LOW (ref 2.4–4.7)
PLATELET # BLD AUTO: 318 K/UL — SIGNIFICANT CHANGE UP (ref 150–400)
POTASSIUM SERPL-MCNC: 4.2 MMOL/L — SIGNIFICANT CHANGE UP (ref 3.5–5.3)
POTASSIUM SERPL-SCNC: 4.2 MMOL/L — SIGNIFICANT CHANGE UP (ref 3.5–5.3)
RBC # BLD: 3.8 M/UL — SIGNIFICANT CHANGE UP (ref 3.8–5.2)
RBC # FLD: 13.2 % — SIGNIFICANT CHANGE UP (ref 10.3–14.5)
SODIUM SERPL-SCNC: 143 MMOL/L — SIGNIFICANT CHANGE UP (ref 135–145)
TROPONIN T SERPL-MCNC: 0.02 NG/ML — SIGNIFICANT CHANGE UP (ref 0–0.06)
WBC # BLD: 14.3 K/UL — HIGH (ref 3.8–10.5)
WBC # FLD AUTO: 14.3 K/UL — HIGH (ref 3.8–10.5)

## 2020-06-08 PROCEDURE — 70450 CT HEAD/BRAIN W/O DYE: CPT | Mod: 26

## 2020-06-08 PROCEDURE — 99233 SBSQ HOSP IP/OBS HIGH 50: CPT

## 2020-06-08 RX ORDER — HYDRALAZINE HCL 50 MG
25 TABLET ORAL THREE TIMES A DAY
Refills: 0 | Status: DISCONTINUED | OUTPATIENT
Start: 2020-06-08 | End: 2020-06-10

## 2020-06-08 RX ORDER — HYDRALAZINE HCL 50 MG
10 TABLET ORAL ONCE
Refills: 0 | Status: COMPLETED | OUTPATIENT
Start: 2020-06-08 | End: 2020-06-08

## 2020-06-08 RX ORDER — SODIUM,POTASSIUM PHOSPHATES 278-250MG
1 POWDER IN PACKET (EA) ORAL THREE TIMES A DAY
Refills: 0 | Status: COMPLETED | OUTPATIENT
Start: 2020-06-08 | End: 2020-06-09

## 2020-06-08 RX ORDER — CEFTRIAXONE 500 MG/1
1000 INJECTION, POWDER, FOR SOLUTION INTRAMUSCULAR; INTRAVENOUS EVERY 24 HOURS
Refills: 0 | Status: DISCONTINUED | OUTPATIENT
Start: 2020-06-08 | End: 2020-06-10

## 2020-06-08 RX ADMIN — CEFTRIAXONE 100 MILLIGRAM(S): 500 INJECTION, POWDER, FOR SOLUTION INTRAMUSCULAR; INTRAVENOUS at 12:28

## 2020-06-08 RX ADMIN — Medication 1 PACKET(S): at 21:11

## 2020-06-08 RX ADMIN — Medication 25 MILLIGRAM(S): at 12:28

## 2020-06-08 RX ADMIN — ATORVASTATIN CALCIUM 20 MILLIGRAM(S): 80 TABLET, FILM COATED ORAL at 21:11

## 2020-06-08 RX ADMIN — Medication 0.5 MILLIGRAM(S): at 11:16

## 2020-06-08 RX ADMIN — Medication 1300 MILLIGRAM(S): at 21:11

## 2020-06-08 RX ADMIN — Medication 10 MILLIGRAM(S): at 08:26

## 2020-06-08 RX ADMIN — Medication 50 MILLIGRAM(S): at 05:25

## 2020-06-08 RX ADMIN — Medication 81 MILLIGRAM(S): at 11:15

## 2020-06-08 RX ADMIN — Medication 0.5 MILLIGRAM(S): at 21:11

## 2020-06-08 RX ADMIN — Medication 1 PACKET(S): at 17:07

## 2020-06-08 RX ADMIN — Medication 1300 MILLIGRAM(S): at 05:25

## 2020-06-08 RX ADMIN — ENOXAPARIN SODIUM 40 MILLIGRAM(S): 100 INJECTION SUBCUTANEOUS at 11:14

## 2020-06-08 RX ADMIN — Medication 25 MILLIGRAM(S): at 21:11

## 2020-06-08 RX ADMIN — Medication 1000 UNIT(S): at 11:15

## 2020-06-08 RX ADMIN — Medication 1300 MILLIGRAM(S): at 14:30

## 2020-06-08 NOTE — PROGRESS NOTE ADULT - SUBJECTIVE AND OBJECTIVE BOX
[facility]                              [address] [address]                                                 PHONE: [phone]                                                 FAX: [phone]  -----------------------------------------------------------------------------------------------  Pt seen and examined. FU for  shortness of breath      Overnight events/Complaints: Pt without complains. Sitting up in chair.    Vital Signs Last 24 Hrs  T(C): 36.8 (08 Jun 2020 07:52), Max: 37.2 (07 Jun 2020 20:07)  T(F): 98.2 (08 Jun 2020 07:52), Max: 98.9 (07 Jun 2020 20:07)  HR: 77 (08 Jun 2020 07:52) (70 - 95)  BP: 173/113 (08 Jun 2020 07:52) (115/83 - 173/113)  BP(mean): --  RR: 18 (08 Jun 2020 07:52) (18 - 18)  SpO2: 95% (07 Jun 2020 23:08) (95% - 96%)  I&O's Summary    07 Jun 2020 07:01  -  08 Jun 2020 07:00  --------------------------------------------------------  IN: 0 mL / OUT: 600 mL / NET: -600 mL    PHYSICAL EXAM:  Constitutional: Appears well developed, well nourished; alert and co-operative  HEENT:     Head: Normocephalic and atraumatic  Neck: supple. No JVD  Cardiovascular: regular S1 S2  Respiratory: Lungs clear to auscultation; no crepitations, no wheeze  Gastrointestinal:  Soft, Non-tender, + BS	  Musculoskeletal: Normal range of motion. No edema  Skin: Warm and dry. No cyanosis . No diaphoresis.  Neurologic: Alert oriented to time place and person. Normal strength and no tremor.        LABS:                        11.8   14.30 )-----------( 318      ( 08 Jun 2020 06:38 )             36.6     06-08    143  |  105  |  25.0<H>  ----------------------------<  91  4.2   |  26.0  |  0.79    Ca    8.9      08 Jun 2020 06:38  Phos  2.3     06-08      CARDIAC MARKERS ( 08 Jun 2020 06:38 )  x     / 0.02 ng/mL / x     / x     / x        RADIOLOGY & ADDITIONAL STUDIES: (reviewed)  CXR was independently reviewed and demonstrated: clear lungs with prior lobectomy    CARDIOLOGY TESTING:(reviewed)     ECG (Independent visualization): NSR with t wave inversions in the inferior leads    ECHOCARDIOGRAM :   1. Left ventricular ejection fraction, by visual estimation, is 55 to   60%.   2. Normal global left ventricular systolic function.   3. The mitral in-flow pattern reveals no discernable A-wave, therefore   no comment on diastolic function can be made.   4. There is no evidence of pericardial effusion.   5. Degenerative mitral valve.   6. Moderate to severe mitral annular calcification.   7. Thickening and calcification of the posterior mitral valve leaflet.   8. Moderate tricuspid regurgitation.   9. Sclerotic aortic valve with normal opening.    MEDICATIONS:(reviewed)  MEDICATIONS  (STANDING):  aspirin  chewable 81 milliGRAM(s) Oral daily  atorvastatin 20 milliGRAM(s) Oral at bedtime  cholecalciferol 1000 Unit(s) Oral daily  enoxaparin Injectable 40 milliGRAM(s) SubCutaneous daily  lactated ringers. 1000 milliLiter(s) (100 mL/Hr) IV Continuous <Continuous>  methylPREDNISolone 2 milliGRAM(s) Oral every other day  metoprolol tartrate 25 milliGRAM(s) Oral at bedtime  metoprolol tartrate 50 milliGRAM(s) Oral daily  sodium bicarbonate 1300 milliGRAM(s) Oral three times a day

## 2020-06-08 NOTE — PROGRESS NOTE ADULT - SUBJECTIVE AND OBJECTIVE BOX
NEPHROLOGY INTERVAL HPI/OVERNIGHT EVENTS:    Feels better   cardio follow up noted   No new complaints     MEDICATIONS  (STANDING):  aspirin  chewable 81 milliGRAM(s) Oral daily  atorvastatin 20 milliGRAM(s) Oral at bedtime  cefTRIAXone   IVPB 1000 milliGRAM(s) IV Intermittent every 24 hours  cholecalciferol 1000 Unit(s) Oral daily  enoxaparin Injectable 40 milliGRAM(s) SubCutaneous daily  hydrALAZINE 25 milliGRAM(s) Oral three times a day  lactated ringers. 1000 milliLiter(s) (100 mL/Hr) IV Continuous <Continuous>  methylPREDNISolone 2 milliGRAM(s) Oral every other day  metoprolol tartrate 25 milliGRAM(s) Oral at bedtime  metoprolol tartrate 50 milliGRAM(s) Oral daily  potassium phosphate / sodium phosphate powder 1 Packet(s) Oral three times a day  sodium bicarbonate 1300 milliGRAM(s) Oral three times a day    MEDICATIONS  (PRN):  ALPRAZolam 0.5 milliGRAM(s) Oral two times a day PRN anxiwty      Allergies    No Known Allergies    Intolerances          Vital Signs Last 24 Hrs  T(C): 36.8 (08 Jun 2020 07:52), Max: 37.2 (07 Jun 2020 20:07)  T(F): 98.2 (08 Jun 2020 07:52), Max: 98.9 (07 Jun 2020 20:07)  HR: 77 (08 Jun 2020 07:52) (70 - 95)  BP: 173/113 (08 Jun 2020 07:52) (115/83 - 173/113)  BP(mean): --  RR: 18 (08 Jun 2020 07:52) (18 - 18)  SpO2: 95% (07 Jun 2020 23:08) (95% - 96%)  Daily     Daily   I&O's Detail    07 Jun 2020 07:01  -  08 Jun 2020 07:00  --------------------------------------------------------  IN:  Total IN: 0 mL    OUT:    Voided: 600 mL  Total OUT: 600 mL    Total NET: -600 mL        I&O's Summary    07 Jun 2020 07:01  -  08 Jun 2020 07:00  --------------------------------------------------------  IN: 0 mL / OUT: 600 mL / NET: -600 mL        PHYSICAL EXAM:  HEAD:  Atraumatic, Normocephalic  EYES: EOMI  NECK: Supple, neck  veins full  NERVOUS SYSTEM:  Alert & Oriented X3  CHEST/LUNG: Clear to percussion bilaterally  HEART: Regular rate and rhythm  ABDOMEN: Soft, Nontender, Nondistended; Bowel sounds present  EXTREMITIES: Trace LE edema  LABS:                        11.8   14.30 )-----------( 318      ( 08 Jun 2020 06:38 )             36.6     06-08    143  |  105  |  25.0<H>  ----------------------------<  91  4.2   |  26.0  |  0.79    Ca    8.9      08 Jun 2020 06:38  Phos  2.3     06-08          Phosphorus Level, Serum: 2.3 mg/dL (06-08 @ 06:38)          RADIOLOGY & ADDITIONAL TESTS:

## 2020-06-08 NOTE — PROGRESS NOTE ADULT - SUBJECTIVE AND OBJECTIVE BOX
follow up for ARF . leg weakness   seen in am , less anxious today   sitting in the chair m, she has old CVa with left sided leg weakness at baseline   no new symptoms , while reviewing labs UA result + nitrite   she was treated few weeks ago with levaquin for UTI , no urinary symptoms then or now       Vital Signs Last 24 Hrs  T(C): 36.8 (08 Jun 2020 07:52), Max: 37.2 (07 Jun 2020 20:07)  T(F): 98.2 (08 Jun 2020 07:52), Max: 98.9 (07 Jun 2020 20:07)  HR: 77 (08 Jun 2020 07:52) (70 - 95)  BP: 173/113 (08 Jun 2020 07:52) (115/83 - 173/113)  BP(mean): --  RR: 18 (08 Jun 2020 07:52) (18 - 18)  SpO2: 95% (07 Jun 2020 23:08) (95% - 96%)    	GENERAL: No distress sitting in the chair   	NERVOUS SYSTEM:  Alert & Oriented X3, Good concentration; Motor Strength 5/5 B/L upper and lower extremities  	CHEST/LUNG: clear to auscultation   	HEART: Regular rate and rhythm; s1/s2 No murmurs, rubs, or gallops  	ABDOMEN: Soft, Nontender, Nondistended; Bowel sounds present  	EXTREMITIES:  No edema                             11.8   14.30 )-----------( 318      ( 08 Jun 2020 06:38 )             36.6   06-08    143  |  105  |  25.0<H>  ----------------------------<  91  4.2   |  26.0  |  0.79    Ca    8.9      08 Jun 2020 06:38  Phos  2.3     06-08      < from: CT Thoracic Spine Reform No Cont (06.04.20 @ 15:51) >  IMPRESSION: No acute fracture dislocation involving the thoracic or lumbar spine. Consider MRI as clinically. Additional findings above.    < from: CT Head No Cont (06.08.20 @ 06:25) >    IMPRESSION:   Mild. Ventricular white matter ischemia unchanged.    Tiny old lacunar infarctions in the lateral basal ganglia.        < end of copied text >  Urine Microscopic-Add On (NC) (06.05.20 @ 08:11)    Red Blood Cell - Urine: 0-2 /HPF    White Blood Cell - Urine: 3-5    Bacteria: Occasional    Epithelial Cells: Occasional

## 2020-06-09 LAB
APPEARANCE UR: CLEAR — SIGNIFICANT CHANGE UP
BACTERIA # UR AUTO: ABNORMAL
BILIRUB UR-MCNC: NEGATIVE — SIGNIFICANT CHANGE UP
COLOR SPEC: YELLOW — SIGNIFICANT CHANGE UP
DIFF PNL FLD: ABNORMAL
EPI CELLS # UR: SIGNIFICANT CHANGE UP
GLUCOSE UR QL: NEGATIVE — SIGNIFICANT CHANGE UP
KETONES UR-MCNC: ABNORMAL
LEUKOCYTE ESTERASE UR-ACNC: ABNORMAL
NITRITE UR-MCNC: POSITIVE
PH UR: 6 — SIGNIFICANT CHANGE UP (ref 5–8)
PROT UR-MCNC: 30 MG/DL
RBC CASTS # UR COMP ASSIST: ABNORMAL /HPF (ref 0–4)
SP GR SPEC: 1.01 — SIGNIFICANT CHANGE UP (ref 1.01–1.02)
UROBILINOGEN FLD QL: NEGATIVE — SIGNIFICANT CHANGE UP
WBC UR QL: >50

## 2020-06-09 PROCEDURE — 99233 SBSQ HOSP IP/OBS HIGH 50: CPT

## 2020-06-09 RX ADMIN — Medication 1 PACKET(S): at 05:13

## 2020-06-09 RX ADMIN — Medication 50 MILLIGRAM(S): at 05:13

## 2020-06-09 RX ADMIN — CEFTRIAXONE 100 MILLIGRAM(S): 500 INJECTION, POWDER, FOR SOLUTION INTRAMUSCULAR; INTRAVENOUS at 13:01

## 2020-06-09 RX ADMIN — Medication 1300 MILLIGRAM(S): at 13:00

## 2020-06-09 RX ADMIN — ATORVASTATIN CALCIUM 20 MILLIGRAM(S): 80 TABLET, FILM COATED ORAL at 21:45

## 2020-06-09 RX ADMIN — Medication 2 MILLIGRAM(S): at 12:59

## 2020-06-09 RX ADMIN — Medication 1300 MILLIGRAM(S): at 05:13

## 2020-06-09 RX ADMIN — Medication 25 MILLIGRAM(S): at 15:24

## 2020-06-09 RX ADMIN — Medication 25 MILLIGRAM(S): at 21:44

## 2020-06-09 RX ADMIN — Medication 1300 MILLIGRAM(S): at 21:44

## 2020-06-09 RX ADMIN — Medication 0.5 MILLIGRAM(S): at 21:44

## 2020-06-09 RX ADMIN — Medication 81 MILLIGRAM(S): at 12:59

## 2020-06-09 RX ADMIN — ENOXAPARIN SODIUM 40 MILLIGRAM(S): 100 INJECTION SUBCUTANEOUS at 13:01

## 2020-06-09 RX ADMIN — Medication 25 MILLIGRAM(S): at 21:45

## 2020-06-09 RX ADMIN — Medication 1000 UNIT(S): at 12:59

## 2020-06-09 RX ADMIN — Medication 25 MILLIGRAM(S): at 05:12

## 2020-06-09 NOTE — PROGRESS NOTE ADULT - SUBJECTIVE AND OBJECTIVE BOX
NEPHROLOGY INTERVAL HPI/OVERNIGHT EVENTS:    Feels better   No new events   Meds reviewed   BP stable     MEDICATIONS  (STANDING):  aspirin  chewable 81 milliGRAM(s) Oral daily  atorvastatin 20 milliGRAM(s) Oral at bedtime  cefTRIAXone   IVPB 1000 milliGRAM(s) IV Intermittent every 24 hours  cholecalciferol 1000 Unit(s) Oral daily  enoxaparin Injectable 40 milliGRAM(s) SubCutaneous daily  hydrALAZINE 25 milliGRAM(s) Oral three times a day  lactated ringers. 1000 milliLiter(s) (100 mL/Hr) IV Continuous <Continuous>  methylPREDNISolone 2 milliGRAM(s) Oral every other day  metoprolol tartrate 25 milliGRAM(s) Oral at bedtime  metoprolol tartrate 50 milliGRAM(s) Oral daily  sodium bicarbonate 1300 milliGRAM(s) Oral three times a day    MEDICATIONS  (PRN):  ALPRAZolam 0.5 milliGRAM(s) Oral two times a day PRN anxiwty      Allergies    No Known Allergies    Intolerances          Vital Signs Last 24 Hrs  T(C): 36.9 (2020 08:23), Max: 37.2 (2020 20:10)  T(F): 98.5 (2020 08:23), Max: 99 (2020 20:10)  HR: 84 (2020 08:23) (75 - 99)  BP: 149/80 (2020 08:23) (116/92 - 150/93)  BP(mean): --  RR: 19 (2020 08:23) (17 - 20)  SpO2: 93% (2020 08:23) (93% - 96%)  Daily     Daily Weight in k (2020 09:50)  I&O's Detail    2020 07:01  -  2020 07:00  --------------------------------------------------------  IN:  Total IN: 0 mL    OUT:    Stool: 1 mL  Total OUT: 1 mL    Total NET: -1 mL        I&O's Summary    2020 07:01  -  2020 07:00  --------------------------------------------------------  IN: 0 mL / OUT: 1 mL / NET: -1 mL        PHYSICAL EXAM:    HEAD:  Atraumatic, Normocephalic  EYES: EOMI  NECK: Supple, neck  veins full  NERVOUS SYSTEM:  Alert & Oriented X3  CHEST/LUNG: Clear to percussion bilaterally  HEART: Regular rate and rhythm  ABDOMEN: Soft, Nontender, Nondistended; Bowel sounds present  EXTREMITIES: Trace LE edema    LABS:                        11.8   14.30 )-----------( 318      ( 2020 06:38 )             36.6     06-08    143  |  105  |  25.0<H>  ----------------------------<  91  4.2   |  26.0  |  0.79    Ca    8.9      2020 06:38  Phos  2.3     06-08          Vitamin D, 25-Hydroxy: 52.3 ng/mL ( @ 15:53)          RADIOLOGY & ADDITIONAL TESTS:

## 2020-06-09 NOTE — DIETITIAN INITIAL EVALUATION ADULT. - PERTINENT LABORATORY DATA
06-08 Na143 mmol/L Glu 91 mg/dL K+ 4.2 mmol/L Cr  0.79 mg/dL BUN 25.0 mg/dL<H> Phos 2.3 mg/dL<L> Alb n/a   PAB n/a     n/a  HgbA1C

## 2020-06-09 NOTE — DIETITIAN INITIAL EVALUATION ADULT. - OTHER INFO
Pt is a 70 year old Female with prior history of lung CA, copd not on home o2, CVA with left sided residual weakness, htn and rheumatoid arthritis presents with worsening weakness especially on the left side, lightheadedness and a mechanical fall. Patient states her rheumatologist started her on po steroids and hydroxychloroquine and ever since than has not felt well.   Found to have RAINE with CR greater than 3 and was also hypotensive, evaluated by nephrology , iv hydration given , in the ED CT of spines, head were performed no acute pathology ,cr and hypotension improving with fluid , Ct of head repeat no acute infarcts , UA positive nitrite ,cbc is worsening  leukocytosis.   Pt reports eating well PTA, endorsees a 50# wt gain over past 6 months secondary to being on steroids (no change in appetite or PO intake noted).

## 2020-06-10 LAB
-  AMIKACIN: SIGNIFICANT CHANGE UP
-  AMPICILLIN/SULBACTAM: SIGNIFICANT CHANGE UP
-  AMPICILLIN: SIGNIFICANT CHANGE UP
-  AZTREONAM: SIGNIFICANT CHANGE UP
-  CEFAZOLIN: SIGNIFICANT CHANGE UP
-  CEFEPIME: SIGNIFICANT CHANGE UP
-  CEFOXITIN: SIGNIFICANT CHANGE UP
-  CEFTRIAXONE: SIGNIFICANT CHANGE UP
-  CIPROFLOXACIN: SIGNIFICANT CHANGE UP
-  GENTAMICIN: SIGNIFICANT CHANGE UP
-  IMIPENEM: SIGNIFICANT CHANGE UP
-  LEVOFLOXACIN: SIGNIFICANT CHANGE UP
-  MEROPENEM: SIGNIFICANT CHANGE UP
-  NITROFURANTOIN: SIGNIFICANT CHANGE UP
-  PIPERACILLIN/TAZOBACTAM: SIGNIFICANT CHANGE UP
-  TIGECYCLINE: SIGNIFICANT CHANGE UP
-  TOBRAMYCIN: SIGNIFICANT CHANGE UP
-  TRIMETHOPRIM/SULFAMETHOXAZOLE: SIGNIFICANT CHANGE UP
ANION GAP SERPL CALC-SCNC: 12 MMOL/L — SIGNIFICANT CHANGE UP (ref 5–17)
BUN SERPL-MCNC: 18 MG/DL — SIGNIFICANT CHANGE UP (ref 8–20)
CALCIUM SERPL-MCNC: 8.5 MG/DL — LOW (ref 8.6–10.2)
CHLORIDE SERPL-SCNC: 103 MMOL/L — SIGNIFICANT CHANGE UP (ref 98–107)
CO2 SERPL-SCNC: 27 MMOL/L — SIGNIFICANT CHANGE UP (ref 22–29)
CREAT SERPL-MCNC: 0.67 MG/DL — SIGNIFICANT CHANGE UP (ref 0.5–1.3)
CULTURE RESULTS: SIGNIFICANT CHANGE UP
GLUCOSE SERPL-MCNC: 100 MG/DL — HIGH (ref 70–99)
HCT VFR BLD CALC: 35.4 % — SIGNIFICANT CHANGE UP (ref 34.5–45)
HGB BLD-MCNC: 11.4 G/DL — LOW (ref 11.5–15.5)
MCHC RBC-ENTMCNC: 30.8 PG — SIGNIFICANT CHANGE UP (ref 27–34)
MCHC RBC-ENTMCNC: 32.2 GM/DL — SIGNIFICANT CHANGE UP (ref 32–36)
MCV RBC AUTO: 95.7 FL — SIGNIFICANT CHANGE UP (ref 80–100)
METHOD TYPE: SIGNIFICANT CHANGE UP
ORGANISM # SPEC MICROSCOPIC CNT: SIGNIFICANT CHANGE UP
ORGANISM # SPEC MICROSCOPIC CNT: SIGNIFICANT CHANGE UP
PLATELET # BLD AUTO: 307 K/UL — SIGNIFICANT CHANGE UP (ref 150–400)
POTASSIUM SERPL-MCNC: 3.2 MMOL/L — LOW (ref 3.5–5.3)
POTASSIUM SERPL-SCNC: 3.2 MMOL/L — LOW (ref 3.5–5.3)
RBC # BLD: 3.7 M/UL — LOW (ref 3.8–5.2)
RBC # FLD: 13.2 % — SIGNIFICANT CHANGE UP (ref 10.3–14.5)
SODIUM SERPL-SCNC: 142 MMOL/L — SIGNIFICANT CHANGE UP (ref 135–145)
SPECIMEN SOURCE: SIGNIFICANT CHANGE UP
WBC # BLD: 14.53 K/UL — HIGH (ref 3.8–10.5)
WBC # FLD AUTO: 14.53 K/UL — HIGH (ref 3.8–10.5)

## 2020-06-10 PROCEDURE — 99223 1ST HOSP IP/OBS HIGH 75: CPT

## 2020-06-10 PROCEDURE — 76775 US EXAM ABDO BACK WALL LIM: CPT | Mod: 26

## 2020-06-10 PROCEDURE — 99232 SBSQ HOSP IP/OBS MODERATE 35: CPT

## 2020-06-10 RX ORDER — METOPROLOL TARTRATE 50 MG
50 TABLET ORAL
Refills: 0 | Status: DISCONTINUED | OUTPATIENT
Start: 2020-06-11 | End: 2020-06-11

## 2020-06-10 RX ORDER — POTASSIUM CHLORIDE 20 MEQ
40 PACKET (EA) ORAL ONCE
Refills: 0 | Status: COMPLETED | OUTPATIENT
Start: 2020-06-10 | End: 2020-06-10

## 2020-06-10 RX ORDER — PREGABALIN 225 MG/1
1 CAPSULE ORAL
Qty: 0 | Refills: 0 | DISCHARGE

## 2020-06-10 RX ORDER — POTASSIUM CHLORIDE 20 MEQ
20 PACKET (EA) ORAL
Refills: 0 | Status: DISCONTINUED | OUTPATIENT
Start: 2020-06-10 | End: 2020-06-10

## 2020-06-10 RX ORDER — ALPRAZOLAM 0.25 MG
1 TABLET ORAL
Qty: 0 | Refills: 0 | DISCHARGE

## 2020-06-10 RX ORDER — UMECLIDINIUM BROMIDE AND VILANTEROL TRIFENATATE 62.5; 25 UG/1; UG/1
1 POWDER RESPIRATORY (INHALATION)
Qty: 0 | Refills: 0 | DISCHARGE

## 2020-06-10 RX ORDER — HYDROXYCHLOROQUINE SULFATE 200 MG
1 TABLET ORAL
Qty: 0 | Refills: 0 | DISCHARGE

## 2020-06-10 RX ORDER — HYDRALAZINE HCL 50 MG
50 TABLET ORAL THREE TIMES A DAY
Refills: 0 | Status: DISCONTINUED | OUTPATIENT
Start: 2020-06-10 | End: 2020-06-11

## 2020-06-10 RX ORDER — CHOLECALCIFEROL (VITAMIN D3) 125 MCG
1 CAPSULE ORAL
Qty: 0 | Refills: 0 | DISCHARGE

## 2020-06-10 RX ORDER — FUROSEMIDE 40 MG
40 TABLET ORAL ONCE
Refills: 0 | Status: DISCONTINUED | OUTPATIENT
Start: 2020-06-10 | End: 2020-06-10

## 2020-06-10 RX ORDER — ACETAMINOPHEN 500 MG
650 TABLET ORAL ONCE
Refills: 0 | Status: COMPLETED | OUTPATIENT
Start: 2020-06-10 | End: 2020-06-10

## 2020-06-10 RX ORDER — MEROPENEM 1 G/30ML
500 INJECTION INTRAVENOUS EVERY 8 HOURS
Refills: 0 | Status: DISCONTINUED | OUTPATIENT
Start: 2020-06-10 | End: 2020-06-10

## 2020-06-10 RX ORDER — ROSUVASTATIN CALCIUM 5 MG/1
1 TABLET ORAL
Qty: 0 | Refills: 0 | DISCHARGE

## 2020-06-10 RX ORDER — CEFTRIAXONE 500 MG/1
1000 INJECTION, POWDER, FOR SOLUTION INTRAMUSCULAR; INTRAVENOUS EVERY 24 HOURS
Refills: 0 | Status: DISCONTINUED | OUTPATIENT
Start: 2020-06-10 | End: 2020-06-11

## 2020-06-10 RX ADMIN — Medication 1300 MILLIGRAM(S): at 14:35

## 2020-06-10 RX ADMIN — Medication 1300 MILLIGRAM(S): at 05:13

## 2020-06-10 RX ADMIN — Medication 81 MILLIGRAM(S): at 12:37

## 2020-06-10 RX ADMIN — Medication 25 MILLIGRAM(S): at 05:13

## 2020-06-10 RX ADMIN — Medication 50 MILLIGRAM(S): at 05:13

## 2020-06-10 RX ADMIN — Medication 1300 MILLIGRAM(S): at 21:14

## 2020-06-10 RX ADMIN — ENOXAPARIN SODIUM 40 MILLIGRAM(S): 100 INJECTION SUBCUTANEOUS at 12:37

## 2020-06-10 RX ADMIN — Medication 20 MILLIEQUIVALENT(S): at 09:11

## 2020-06-10 RX ADMIN — Medication 1000 UNIT(S): at 12:37

## 2020-06-10 RX ADMIN — MEROPENEM 100 MILLIGRAM(S): 1 INJECTION INTRAVENOUS at 14:34

## 2020-06-10 RX ADMIN — ATORVASTATIN CALCIUM 20 MILLIGRAM(S): 80 TABLET, FILM COATED ORAL at 21:15

## 2020-06-10 RX ADMIN — Medication 40 MILLIEQUIVALENT(S): at 12:37

## 2020-06-10 RX ADMIN — Medication 50 MILLIGRAM(S): at 21:16

## 2020-06-10 RX ADMIN — Medication 0.5 MILLIGRAM(S): at 16:52

## 2020-06-10 RX ADMIN — Medication 650 MILLIGRAM(S): at 06:24

## 2020-06-10 RX ADMIN — CEFTRIAXONE 100 MILLIGRAM(S): 500 INJECTION, POWDER, FOR SOLUTION INTRAMUSCULAR; INTRAVENOUS at 16:54

## 2020-06-10 NOTE — CONSULT NOTE ADULT - REASON FOR ADMISSION
weakness, lethargy back pain and fall

## 2020-06-10 NOTE — PROGRESS NOTE ADULT - SUBJECTIVE AND OBJECTIVE BOX
follow up for ARF . leg weakness   seen in am , doing well , stable , no distress   ID called to evaluate for UTI , cx positive for klabsiella     no overnight events reported     Vital Signs Last 24 Hrs  T(C): 36.7 (10 Jose David 2020 08:00), Max: 36.8 (09 Jun 2020 20:16)  T(F): 98 (10 Jose David 2020 08:00), Max: 98.2 (09 Jun 2020 20:16)  HR: 89 (10 Jose David 2020 08:00) (89 - 98)  BP: 175/78 (10 Jose David 2020 08:00) (168/97 - 175/78)  BP(mean): --  RR: 19 (10 Jose David 2020 08:00) (19 - 19)  SpO2: 94% (10 Jose David 2020 08:00) (94% - 96%)    	GENERAL: No distress sitting in the bed     Chest : clear to auscultation   	HEART: Regular rate and rhythm; s1/s2 No murmurs, rubs, or gallops  	ABDOMEN: Soft, Nontender, Nondistended; Bowel sounds present  	EXTREMITIES:  No edema                           11.4   14.53 )-----------( 307      ( 10 Jose David 2020 06:29 )             35.4   06-10    142  |  103  |  18.0  ----------------------------<  100<H>  3.2<L>   |  27.0  |  0.67    Ca    8.5<L>      10 Jose David 2020 06:29                < from: CT Thoracic Spine Reform No Cont (06.04.20 @ 15:51) >  IMPRESSION: No acute fracture dislocation involving the thoracic or lumbar spine. Consider MRI as clinically. Additional findings above.    < from: CT Head No Cont (06.08.20 @ 06:25) >    IMPRESSION:   Mild. Ventricular white matter ischemia unchanged.    Tiny old lacunar infarctions in the lateral basal ganglia.        < end of copied text >  Urine Microscopic-Add On (NC) (06.05.20 @ 08:11)    Red Blood Cell - Urine: 0-2 /HPF    White Blood Cell - Urine: 3-5    Bacteria: Occasional    Epithelial Cells: Occasional

## 2020-06-10 NOTE — CONSULT NOTE ADULT - ASSESSMENT
70y  Female with h/o lung CA, COPD not on home O2, CVA with left sided residual weakness, HTN and rheumatoid arthritis. Patient initially presents with worsening weakness especially on the left side, lightheadedness and a mechanical fall. Patient states her rheumatologist started her on po steroids and hydroxychloroquine and ever since than has not felt well. Found to have RAINE with creatinine of greater than 3 and was also hypotensive. Patient was evaluated by nephrology. In the ED CT of spines, head, chest, abd/pel were performed no acute pathology. Patient was started on Ceftriaxone for possible UTI on 6/8 and switched to Meropenem 6/10.      Klebsiella UTI   Leukocytosis       - Urine culture klebsiella   - COVID 19 PCR negative  - CT A/P with no acute findings   - UA reporting pyuria  - Check Renal US  - Procalcitonin level 0.23  - D/C Meropenem  - Start Ceftriaxone 1gm IV q 24hours   - Trend Fever  - Trend Leukocytosis      Will Follow
70y Female with prior history of lung CA, copd not on home o2, CVA with left sided residual weakness, htn and rheumatoid arthritis presents with worsening weakness especially on the left side, lightheadedness and a mechanical fall. Patient states her rheumatologist started her on po steroids and hydroxychloroquine and ever since than has not felt well.   Found to have RAINE with CR greater than 3 and was also hypotensive.   ECG with ST changes in the inferior leads    Abnormal ECG  - Check Echo to assess LV function     - 1st troponin was negative. Will check another one with next labs    CVA  - Continue ASA    HTN  - BP controlled on metoprolol    Dyslipidemia  - Continue statin
RAINE on CKD suspect stage III  Suspect hypoperfusion renal function improved w IVF  CT abd pelvis noted R kidney atrophy no hydronephrosis  Elevated BUN likely due to steroids  Metabolic acidosis will give po bicarb  If discharged needs f/u in our office within 2 weeks    Will follow

## 2020-06-10 NOTE — CONSULT NOTE ADULT - SUBJECTIVE AND OBJECTIVE BOX
[facility]  INFECTIOUS DISEASES AND INTERNAL MEDICINE at [place]  =======================================================  Lorenzo Pierson MD  Diplomates American Board of Internal Medicine and Infectious Diseases  Tel: [phone]      Fax: [phone]  =======================================================      [id number]  MELISSA BRADFORD    CC: Patient is a 70y old  Female who presents with a chief complaint of weakness, lethargy back pain and fall (09 Jun 2020 13:20)      70y  Female with h/o lung CA, COPD not on home O2, CVA with left sided residual weakness, HTN and rheumatoid arthritis. Patient initially presents with worsening weakness especially on the left side, lightheadedness and a mechanical fall. Patient states her rheumatologist started her on po steroids and hydroxychloroquine and ever since than has not felt well. Found to have RAINE with creatinine of greater than 3 and was also hypotensive. Patient was evaluated by nephrology. In the ED CT of spines, head, chest, abd/pel were performed no acute pathology. Patient was started on Ceftriaxone for possible UTI on 6/8 and switched to Meropenem 6/10. ID input requested.       Past Medical & Surgical Hx:  Lung cancer  Atrial fibrillation  Endometriosis  Hypertension  Fibroid tumor  History of ovarian cyst      Social Hx:  Former smoker      FAMILY HISTORY:  Family history of premature CAD (Sibling)  Family history of hypertension (Sibling)  Family history of heart disease      Allergies  No Known Allergies       REVIEW OF SYSTEMS:  CONSTITUTIONAL:  No Fever or chills  HEENT:  No diplopia or blurred vision.  No earache, sore throat or runny nose.  CARDIOVASCULAR:  No pressure, squeezing, strangling, tightness, heaviness or aching about the chest, neck, axilla or epigastrium.  RESPIRATORY:  No cough, shortness of breath  GASTROINTESTINAL:  No nausea, vomiting or diarrhea.  GENITOURINARY:  No dysuria, frequency or urgency. No Blood in urine  MUSCULOSKELETAL:  no joint aches, no muscle pain  SKIN:  No change in skin, hair or nails.  NEUROLOGIC:  No Headaches  PSYCHIATRIC:  No disorder of thought or mood.  ENDOCRINE:  No heat or cold intolerance  HEMATOLOGICAL:  No easy bruising or bleeding.       Physical Exam:  GEN: NAD, pleasant  HEENT: normocephalic and atraumatic. EOMI. PERRL.  Anicteric  NECK: Supple.   LUNGS: Clear to auscultation.  HEART: Regular rate and rhythm   ABDOMEN: Soft, nontender, and nondistended.  Positive bowel sounds.    : No CVA tenderness  EXTREMITIES: Without any edema.  MSK: No joint swelling  NEUROLOGIC: Alert and oriented x3  PSYCHIATRIC: Appropriate affect .  SKIN: No Rash      Vitals:  T(F): 98 (10 Jose David 2020 08:00), Max: 98.2 (09 Jun 2020 20:16)  HR: 89 (10 Jose David 2020 08:00)  BP: 175/78 (10 Jose David 2020 08:00)  RR: 19 (10 Jose David 2020 08:00)  SpO2: 94% (10 Jose David 2020 08:00) (94% - 96%)  temp max in last 48H T(F): , Max: 99 (06-08-20 @ 20:10)      Current Antibiotics:  meropenem  IVPB 500 milliGRAM(s) IV Intermittent every 8 hours      Other medications:  aspirin  chewable 81 milliGRAM(s) Oral daily  atorvastatin 20 milliGRAM(s) Oral at bedtime  cholecalciferol 1000 Unit(s) Oral daily  enoxaparin Injectable 40 milliGRAM(s) SubCutaneous daily  hydrALAZINE 25 milliGRAM(s) Oral three times a day  lactated ringers. 1000 milliLiter(s) IV Continuous <Continuous>  methylPREDNISolone 2 milliGRAM(s) Oral every other day  metoprolol tartrate 25 milliGRAM(s) Oral at bedtime  metoprolol tartrate 50 milliGRAM(s) Oral daily  sodium bicarbonate 1300 milliGRAM(s) Oral three times a day      Labs:                        11.4   14.53 )-----------( 307      ( 10 Jose David 2020 06:29 )             35.4      06-10    142  |  103  |  18.0  ----------------------------<  100<H>  3.2<L>   |  27.0  |  0.67    Ca    8.5<L>      10 Jun 2020 06:29        Culture - Urine (collected 06-08-20 @ 16:45)  Source: .Urine Clean Catch (Midstream)  Final Report (06-10-20 @ 12:31):    >100,000 CFU/ml Klebsiella pneumoniae  Organism: Klebsiella pneumoniae (06-10-20 @ 12:31)  Organism: Klebsiella pneumoniae (06-10-20 @ 12:31)    Sensitivities:      -  Amikacin: S <=16      -  Ampicillin: R >16 These ampicillin results predict results for amoxicillin      -  Ampicillin/Sulbactam: S <=8/4 Enterobacter, Citrobacter, and Serratia may develop resistance during prolonged therapy (3-4 days)      -  Aztreonam: S <=4      -  Cefazolin: S <=8 (MIC_CL_COM_ENTERIC_CEFAZU) For uncomplicated UTI with K. pneumoniae, E. coli, or P. mirablis: NONA <=16 is sensitive and NONA >=32 is resistant. This also predicts results for oral agents cefaclor, cefdinir, cefpodoxime, cefprozil, cefuroxime axetil, cephalexin and locarbef for uncomplicated UTI. Note that some isolates may be susceptible to these agents while testing resistant to cefazolin.      -  Cefepime: S <=4      -  Cefoxitin: S <=8      -  Ceftriaxone: S <=1 Enterobacter, Citrobacter, and Serratia may develop resistance during prolonged therapy      -  Ciprofloxacin: S <=1      -  Gentamicin: S <=4      -  Imipenem: S <=1      -  Levofloxacin: S <=2      -  Meropenem: S <=1      -  Nitrofurantoin: S <=32 Should not be used to treat pyelonephritis      -  Piperacillin/Tazobactam: S <=16      -  Tigecycline: S <=2      -  Tobramycin: S <=4      -  Trimethoprim/Sulfamethoxazole: R >2/38      Method Type: NONA      WBC Count: 14.53 K/uL (06-10-20 @ 06:29)  WBC Count: 14.30 K/uL (06-08-20 @ 06:38)    Creatinine, Serum: 0.67 mg/dL (06-10-20 @ 06:29)  Creatinine, Serum: 0.79 mg/dL (06-08-20 @ 06:38)  Creatinine, Serum: 1.02 mg/dL (06-06-20 @ 06:07)    C-Reactive Protein, Serum: 0.73 mg/dL (06-05-20 @ 07:31)    Ferritin, Serum: 570 ng/mL (06-05-20 @ 07:31)    Procalcitonin, Serum: 0.23 ng/mL (06-05-20 @ 07:31)     COVID-19 PCR: NotDetec (06-04-20 @ 17:24)        < from: CT Abdomen and Pelvis w/ IV Cont (06.04.20 @ 15:44) >   EXAM:  CT ABDOMEN AND PELVIS IC                         EXAM:  CT ANGIO CHEST (W)AW IC                          PROCEDURE DATE:  06/04/2020      INTERPRETATION:  CT ANGIO CHEST WITHOUT AND OR WITH IV CONTRAST, CT ABDOMEN AND PELVIS WITH IV CONTRAST    CLINICAL INFORMATION: hypoxia, hx copd, status post fall trauma, hypotension, back pain      COMPARISON: Chest CT 3/6/2020, CT abdomen and pelvis 9/20/2018    PROCEDURE:   CT Angiography of the Chest was performed followed by portal venous phase imaging of the Abdomen and Pelvis.  Intravenous contrast: 95 cc Omnipaque 350  Oral contrast: None.  Sagittal and coronal reformats were performed as well as 3D (MIP) reconstructions.    FINDINGS:    CHEST:    PULMONARY ARTERIES: No pulmonary embolism.    LUNGS, AIRWAYS: The central airways are patent. Status post right lower lobectomy with stable appearance of the suture line. Bilateral upper lung groundglass and nodular opacities persist and are indeterminate. Developing more solid appearingnodules at the left apex and left lower lobe superior segment measure 1.6 cm and 0.9 cm, respectively (4; 24, 61).    PLEURA: No pleural abnormality.  VESSELS: Aortic atherosclerosis without aneurysm.  HEART: Normal heart size. No pericardial effusion. Coronary artery calcifications are present.  MEDIASTINUM AND CIRO: No adenopathy.  CHEST WALL: No masses.    ABDOMEN AND PELVIS:    LIVER: No hematoma or laceration.  BILE DUCTS: Nondilated.  GALLBLADDER: Normal.  SPLEEN: No hematoma or laceration.  PANCREAS: Stable 7 mm cyst at the tail without main duct dilatation.  ADRENALS: Stable small left-sided nodules.  KIDNEYS/URETERS: No hydronephrosis or urinary tract calculi on the left. Stable right kidney atrophy.    BLADDER: Normal.  REPRODUCTIVEORGANS: Fibroid uterus.    BOWEL: No bowel-related abnormality. Specifically, no evidence of acute diverticulitis. Normal appendix and ileocecal region. No bowel obstruction or bowel inflammation.  PERITONEUM: No free air or ascites.  VESSELS:  Aortoiliac atherosclerosis without aneurysm.  RETROPERITONEUM/LYMPH NODES: No adenopathy.    ABDOMINAL WALL: No hematoma or contusion.  BONES: No acute skeletal trauma. No displaced rib fracture. No thoracolumbar spine compression fracture or traumatic malalignment.    IMPRESSION:     No acute traumatic injury.    No pulmonary embolism.    Bilateral upper lung groundglass and nodular opacities persist and are indeterminate.     Developing more solid appearing nodules at the left apex and left lower lobe superior segment measure 1.6 cm and 0.9 cm, respectively (4; 24, 61). Malignancy cannot be excluded. Dedicated thoracic oncology follow-up is advised.    < end of copied text >

## 2020-06-11 ENCOUNTER — APPOINTMENT (OUTPATIENT)
Dept: PULMONOLOGY | Facility: CLINIC | Age: 70
End: 2020-06-11

## 2020-06-11 VITALS — HEART RATE: 104 BPM | DIASTOLIC BLOOD PRESSURE: 86 MMHG | SYSTOLIC BLOOD PRESSURE: 166 MMHG

## 2020-06-11 LAB
ANION GAP SERPL CALC-SCNC: 9 MMOL/L — SIGNIFICANT CHANGE UP (ref 5–17)
BUN SERPL-MCNC: 17 MG/DL — SIGNIFICANT CHANGE UP (ref 8–20)
CALCIUM SERPL-MCNC: 8.7 MG/DL — SIGNIFICANT CHANGE UP (ref 8.6–10.2)
CHLORIDE SERPL-SCNC: 104 MMOL/L — SIGNIFICANT CHANGE UP (ref 98–107)
CO2 SERPL-SCNC: 27 MMOL/L — SIGNIFICANT CHANGE UP (ref 22–29)
CORTICOSTEROID BINDING GLOBULIN RESULT: 1.7 MG/DL — SIGNIFICANT CHANGE UP
CORTIS F/TOTAL MFR SERPL: 54 % — SIGNIFICANT CHANGE UP
CORTIS SERPL-MCNC: 25 UG/DL — HIGH
CORTISOL, FREE RESULT: 14 UG/DL — HIGH
CREAT SERPL-MCNC: 0.66 MG/DL — SIGNIFICANT CHANGE UP (ref 0.5–1.3)
GLUCOSE SERPL-MCNC: 91 MG/DL — SIGNIFICANT CHANGE UP (ref 70–99)
HCT VFR BLD CALC: 33.4 % — LOW (ref 34.5–45)
HGB BLD-MCNC: 10.6 G/DL — LOW (ref 11.5–15.5)
MAGNESIUM SERPL-MCNC: 1.9 MG/DL — SIGNIFICANT CHANGE UP (ref 1.6–2.6)
MCHC RBC-ENTMCNC: 30.6 PG — SIGNIFICANT CHANGE UP (ref 27–34)
MCHC RBC-ENTMCNC: 31.7 GM/DL — LOW (ref 32–36)
MCV RBC AUTO: 96.5 FL — SIGNIFICANT CHANGE UP (ref 80–100)
NT-PROBNP SERPL-SCNC: 639 PG/ML — HIGH (ref 0–300)
PHOSPHATE SERPL-MCNC: 2.8 MG/DL — SIGNIFICANT CHANGE UP (ref 2.4–4.7)
PLATELET # BLD AUTO: 285 K/UL — SIGNIFICANT CHANGE UP (ref 150–400)
POTASSIUM SERPL-MCNC: 4.1 MMOL/L — SIGNIFICANT CHANGE UP (ref 3.5–5.3)
POTASSIUM SERPL-SCNC: 4.1 MMOL/L — SIGNIFICANT CHANGE UP (ref 3.5–5.3)
RBC # BLD: 3.46 M/UL — LOW (ref 3.8–5.2)
RBC # FLD: 13.5 % — SIGNIFICANT CHANGE UP (ref 10.3–14.5)
SODIUM SERPL-SCNC: 140 MMOL/L — SIGNIFICANT CHANGE UP (ref 135–145)
WBC # BLD: 12.14 K/UL — HIGH (ref 3.8–10.5)
WBC # FLD AUTO: 12.14 K/UL — HIGH (ref 3.8–10.5)

## 2020-06-11 PROCEDURE — 86140 C-REACTIVE PROTEIN: CPT

## 2020-06-11 PROCEDURE — 81001 URINALYSIS AUTO W/SCOPE: CPT

## 2020-06-11 PROCEDURE — 72125 CT NECK SPINE W/O DYE: CPT

## 2020-06-11 PROCEDURE — 76775 US EXAM ABDO BACK WALL LIM: CPT

## 2020-06-11 PROCEDURE — 71275 CT ANGIOGRAPHY CHEST: CPT

## 2020-06-11 PROCEDURE — 36415 COLL VENOUS BLD VENIPUNCTURE: CPT

## 2020-06-11 PROCEDURE — 85027 COMPLETE CBC AUTOMATED: CPT

## 2020-06-11 PROCEDURE — 99232 SBSQ HOSP IP/OBS MODERATE 35: CPT

## 2020-06-11 PROCEDURE — 70450 CT HEAD/BRAIN W/O DYE: CPT

## 2020-06-11 PROCEDURE — 99285 EMERGENCY DEPT VISIT HI MDM: CPT | Mod: 25

## 2020-06-11 PROCEDURE — 82607 VITAMIN B-12: CPT

## 2020-06-11 PROCEDURE — 97163 PT EVAL HIGH COMPLEX 45 MIN: CPT

## 2020-06-11 PROCEDURE — 84443 ASSAY THYROID STIM HORMONE: CPT

## 2020-06-11 PROCEDURE — 83880 ASSAY OF NATRIURETIC PEPTIDE: CPT

## 2020-06-11 PROCEDURE — 71045 X-RAY EXAM CHEST 1 VIEW: CPT

## 2020-06-11 PROCEDURE — 99239 HOSP IP/OBS DSCHRG MGMT >30: CPT

## 2020-06-11 PROCEDURE — 82550 ASSAY OF CK (CPK): CPT

## 2020-06-11 PROCEDURE — 84480 ASSAY TRIIODOTHYRONINE (T3): CPT

## 2020-06-11 PROCEDURE — 82306 VITAMIN D 25 HYDROXY: CPT

## 2020-06-11 PROCEDURE — 84145 PROCALCITONIN (PCT): CPT

## 2020-06-11 PROCEDURE — 97110 THERAPEUTIC EXERCISES: CPT

## 2020-06-11 PROCEDURE — 96361 HYDRATE IV INFUSION ADD-ON: CPT

## 2020-06-11 PROCEDURE — 97116 GAIT TRAINING THERAPY: CPT

## 2020-06-11 PROCEDURE — 74177 CT ABD & PELVIS W/CONTRAST: CPT

## 2020-06-11 PROCEDURE — 93005 ELECTROCARDIOGRAM TRACING: CPT

## 2020-06-11 PROCEDURE — 82728 ASSAY OF FERRITIN: CPT

## 2020-06-11 PROCEDURE — 93010 ELECTROCARDIOGRAM REPORT: CPT

## 2020-06-11 PROCEDURE — 72170 X-RAY EXAM OF PELVIS: CPT

## 2020-06-11 PROCEDURE — 83605 ASSAY OF LACTIC ACID: CPT

## 2020-06-11 PROCEDURE — 84484 ASSAY OF TROPONIN QUANT: CPT

## 2020-06-11 PROCEDURE — 96360 HYDRATION IV INFUSION INIT: CPT

## 2020-06-11 PROCEDURE — 84436 ASSAY OF TOTAL THYROXINE: CPT

## 2020-06-11 PROCEDURE — U0003: CPT

## 2020-06-11 PROCEDURE — 83615 LACTATE (LD) (LDH) ENZYME: CPT

## 2020-06-11 PROCEDURE — 80053 COMPREHEN METABOLIC PANEL: CPT

## 2020-06-11 PROCEDURE — 85379 FIBRIN DEGRADATION QUANT: CPT

## 2020-06-11 PROCEDURE — 87186 SC STD MICRODIL/AGAR DIL: CPT

## 2020-06-11 PROCEDURE — 84100 ASSAY OF PHOSPHORUS: CPT

## 2020-06-11 PROCEDURE — 80048 BASIC METABOLIC PNL TOTAL CA: CPT

## 2020-06-11 PROCEDURE — 93306 TTE W/DOPPLER COMPLETE: CPT

## 2020-06-11 PROCEDURE — 83735 ASSAY OF MAGNESIUM: CPT

## 2020-06-11 PROCEDURE — 87086 URINE CULTURE/COLONY COUNT: CPT

## 2020-06-11 PROCEDURE — 86803 HEPATITIS C AB TEST: CPT

## 2020-06-11 RX ORDER — HYDRALAZINE HCL 50 MG
1 TABLET ORAL
Qty: 0 | Refills: 0 | DISCHARGE
Start: 2020-06-11

## 2020-06-11 RX ORDER — DILTIAZEM HCL 120 MG
1 CAPSULE, EXT RELEASE 24 HR ORAL
Qty: 0 | Refills: 0 | DISCHARGE
Start: 2020-06-11

## 2020-06-11 RX ORDER — CEFPODOXIME PROXETIL 100 MG
200 TABLET ORAL EVERY 12 HOURS
Refills: 0 | Status: DISCONTINUED | OUTPATIENT
Start: 2020-06-11 | End: 2020-06-11

## 2020-06-11 RX ORDER — METOPROLOL TARTRATE 50 MG
5 TABLET ORAL ONCE
Refills: 0 | Status: COMPLETED | OUTPATIENT
Start: 2020-06-11 | End: 2020-06-11

## 2020-06-11 RX ORDER — CEFPODOXIME PROXETIL 100 MG
1 TABLET ORAL
Qty: 0 | Refills: 0 | DISCHARGE
Start: 2020-06-11

## 2020-06-11 RX ORDER — METOPROLOL TARTRATE 50 MG
1 TABLET ORAL
Qty: 0 | Refills: 0 | DISCHARGE
Start: 2020-06-11

## 2020-06-11 RX ORDER — ACETAMINOPHEN 500 MG
650 TABLET ORAL ONCE
Refills: 0 | Status: COMPLETED | OUTPATIENT
Start: 2020-06-11 | End: 2020-06-11

## 2020-06-11 RX ADMIN — Medication 2 MILLIGRAM(S): at 12:40

## 2020-06-11 RX ADMIN — Medication 50 MILLIGRAM(S): at 13:39

## 2020-06-11 RX ADMIN — Medication 50 MILLIGRAM(S): at 08:19

## 2020-06-11 RX ADMIN — ENOXAPARIN SODIUM 40 MILLIGRAM(S): 100 INJECTION SUBCUTANEOUS at 12:39

## 2020-06-11 RX ADMIN — Medication 1300 MILLIGRAM(S): at 13:39

## 2020-06-11 RX ADMIN — Medication 0.5 MILLIGRAM(S): at 02:45

## 2020-06-11 RX ADMIN — Medication 1300 MILLIGRAM(S): at 06:47

## 2020-06-11 RX ADMIN — Medication 1000 UNIT(S): at 12:40

## 2020-06-11 RX ADMIN — Medication 81 MILLIGRAM(S): at 12:40

## 2020-06-11 RX ADMIN — Medication 50 MILLIGRAM(S): at 06:47

## 2020-06-11 RX ADMIN — Medication 5 MILLIGRAM(S): at 02:16

## 2020-06-11 RX ADMIN — Medication 650 MILLIGRAM(S): at 02:16

## 2020-06-11 NOTE — H&P ADULT - NSHPPHYSICALEXAM_GEN_ALL_CORE
PHYSICAL EXAM:    GENERAL: NAD, obese, pleasant   HEAD:  Atraumatic, Normocephalic  EYES: EOMI, PERRLA, conjunctiva and sclera clear  ENMT: No tonsillar erythema, exudates, or enlargement; Moist mucous membranes, Good dentition, No lesions  NECK: Supple, No JVD, Normal thyroid  NERVOUS SYSTEM:  Alert & Oriented X3, Good concentration; Motor Strength 5/5 B/L upper and lower extremities; DTRs 2+ intact and symmetric  CHEST/LUNG: diminished secondary to body habitus  HEART: Regular rate and rhythm; No murmurs, rubs, or gallops  ABDOMEN: Soft, Nontender, Nondistended; Bowel sounds present  EXTREMITIES:  2+ Peripheral Pulses, No edema   LYMPH: No lymphadenopathy noted  SKIN: No rashes or lesions
7

## 2020-06-11 NOTE — CHART NOTE - NSCHARTNOTEFT_GEN_A_CORE
Called by RN with pt's BP of 169/93 P 116.  Pt with hx of atrial fibrillation admitted with weakness, found to have RAINE, CT head negative.  On 6/8, pt with +UA, culture +Klebsiella, placed on Rocephin.  WBC 14.53 (6/10).  Pt was hypokalemic, supplemented with KCl.  Asked RN to obtain rectal temp, pt refused.  Will treat patient empirically with tylenol 650mg pox1 for now.  Will give lopressor 5mg IVP for HTN/tachycardia.  If no improvement in HR, will place pt on cardiac monitor.   Will add Mg, Phos to am labs.  RN to continue to monitor and reassess prn. Called by RN with pt's BP of 169/93 P 116.  Pt with hx of atrial fibrillation admitted with weakness, found to have RAINE, CT head negative.  On 6/8, pt with +UA, culture +Klebsiella, placed on Rocephin.  WBC 14.53 (6/10).  Pt was hypokalemic, supplemented with KCl.  BUN/Cr 18/0.67.  Asked RN to obtain rectal temp, pt refused.  Will treat patient empirically with tylenol 650mg pox1 for now.  Will give lopressor 5mg IVP for HTN/tachycardia.  If no improvement in HR, will place pt on cardiac monitor.   Will add Mg, Phos to am labs.  RN to continue to monitor and reassess prn.

## 2020-06-11 NOTE — PROGRESS NOTE ADULT - SUBJECTIVE AND OBJECTIVE BOX
[facility]  INFECTIOUS DISEASES AND INTERNAL MEDICINE at [place]  =======================================================  Lorenzo Pierson MD  Diplomates American Board of Internal Medicine and Infectious Diseases  Tel: [phone]      Fax: [phone]  =======================================================    MELISSA BRADFORD [id number]    Follow up:      Allergies:  No Known Allergies           REVIEW OF SYSTEMS:  CONSTITUTIONAL:  No Fever or chills  HEENT:   No diplopia or blurred vision.  No earache, sore throat or runny nose.  CARDIOVASCULAR:  No pressure, squeezing, strangling, tightness, heaviness or aching about the chest, neck, axilla or epigastrium.  RESPIRATORY:  No cough, shortness of breath  GASTROINTESTINAL:  No nausea, vomiting or diarrhea.  GENITOURINARY:  No dysuria, frequency or urgency. No Blood in urine  MUSCULOSKELETAL:  no joint aches, no muscle pain  SKIN:  No change in skin, hair or nails.  NEUROLOGIC:  No Headaches, seizures or weakness.  PSYCHIATRIC:  No disorder of thought or mood.  ENDOCRINE:  No heat or cold intolerance  HEMATOLOGICAL:  No easy bruising or bleeding.       Physical Exam:  GEN: NAD, pleasant  HEENT: normocephalic and atraumatic. EOMI. PERRL.  Anicteric   NECK: Supple.   LUNGS: Clear to auscultation.  HEART: Regular rate and rhythm without murmur.  ABDOMEN: Soft, nontender, and nondistended.  Positive bowel sounds.    : No CVA tenderness  EXTREMITIES: Without any edema.  MSK: no joint swelling  NEUROLOGIC: Cranial nerves II through XII are grossly intact. No focal deficits  PSYCHIATRIC: Appropriate affect .  SKIN: No Rash      Vitals:    T(F): 98.4 (11 Jun 2020 08:16), Max: 98.9 (11 Jun 2020 01:05)  HR: 107 (11 Jun 2020 08:16)  BP: 150/81 (11 Jun 2020 08:16)  RR: 19 (11 Jun 2020 08:16)  SpO2: 93% (11 Jun 2020 08:16) (93% - 97%)  temp max in last 48H T(F): , Max: 98.9 (06-11-20 @ 01:05)      Current Antibiotics:  cefTRIAXone   IVPB 1000 milliGRAM(s) IV Intermittent every 24 hours    Other medications:  aspirin  chewable 81 milliGRAM(s) Oral daily  atorvastatin 20 milliGRAM(s) Oral at bedtime  cholecalciferol 1000 Unit(s) Oral daily  enoxaparin Injectable 40 milliGRAM(s) SubCutaneous daily  hydrALAZINE 50 milliGRAM(s) Oral three times a day  lactated ringers. 1000 milliLiter(s) IV Continuous <Continuous>  methylPREDNISolone 2 milliGRAM(s) Oral every other day  metoprolol tartrate 50 milliGRAM(s) Oral two times a day  sodium bicarbonate 1300 milliGRAM(s) Oral three times a day        Labs:                        10.6   12.14 )-----------( 285      ( 11 Jun 2020 06:00 )             33.4      06-11    140  |  104  |  17.0  ----------------------------<  91  4.1   |  27.0  |  0.66    Ca    8.7      11 Jun 2020 06:00  Phos  2.8     06-11  Mg     1.9     06-11        Culture - Urine (collected 06-08-20 @ 16:45)  Source: .Urine Clean Catch (Midstream)  Final Report (06-10-20 @ 12:31):    >100,000 CFU/ml Klebsiella pneumoniae  Organism: Klebsiella pneumoniae (06-10-20 @ 12:31)  Organism: Klebsiella pneumoniae (06-10-20 @ 12:31)    Sensitivities:      -  Amikacin: S <=16      -  Ampicillin: R >16 These ampicillin results predict results for amoxicillin      -  Ampicillin/Sulbactam: S <=8/4 Enterobacter, Citrobacter, and Serratia may develop resistance during prolonged therapy (3-4 days)      -  Aztreonam: S <=4      -  Cefazolin: S <=8 (MIC_CL_COM_ENTERIC_CEFAZU) For uncomplicated UTI with K. pneumoniae, E. coli, or P. mirablis: NONA <=16 is sensitive and NONA >=32 is resistant. This also predicts results for oral agents cefaclor, cefdinir, cefpodoxime, cefprozil, cefuroxime axetil, cephalexin and locarbef for uncomplicated UTI. Note that some isolates may be susceptible to these agents while testing resistant to cefazolin.      -  Cefepime: S <=4      -  Cefoxitin: S <=8      -  Ceftriaxone: S <=1 Enterobacter, Citrobacter, and Serratia may develop resistance during prolonged therapy      -  Ciprofloxacin: S <=1      -  Gentamicin: S <=4      -  Imipenem: S <=1      -  Levofloxacin: S <=2      -  Meropenem: S <=1      -  Nitrofurantoin: S <=32 Should not be used to treat pyelonephritis      -  Piperacillin/Tazobactam: S <=16      -  Tigecycline: S <=2      -  Tobramycin: S <=4      -  Trimethoprim/Sulfamethoxazole: R >2/38      Method Type: NONA      WBC Count: 12.14 K/uL (06-11-20 @ 06:00)  WBC Count: 14.53 K/uL (06-10-20 @ 06:29)  WBC Count: 14.30 K/uL (06-08-20 @ 06:38)    Creatinine, Serum: 0.66 mg/dL (06-11-20 @ 06:00)  Creatinine, Serum: 0.67 mg/dL (06-10-20 @ 06:29)  Creatinine, Serum: 0.79 mg/dL (06-08-20 @ 06:38)    C-Reactive Protein, Serum: 0.73 mg/dL (06-05-20 @ 07:31)    Ferritin, Serum: 570 ng/mL (06-05-20 @ 07:31)    Procalcitonin, Serum: 0.23 ng/mL (06-05-20 @ 07:31)    COVID-19 PCR: NotDetec (06-04-20 @ 17:24)      < from: US Renal (06.10.20 @ 16:22) >  EXAM:  US KIDNEY(S)                          PROCEDURE DATE:  06/10/2020          INTERPRETATION:  CLINICAL INFORMATION: Urinary tract infection    COMPARISON: None available.    TECHNIQUE: Sonography of the kidneys and bladder.     FINDINGS:  Right kidney:  6.8 cm. the right kidney is atrophic and echogenic. No discrete mass or hydronephrosis is seen.  Left kidney:  12.1 cm. No renal mass, hydronephrosis or calculi.    Urinary bladder: Within normal limits.    IMPRESSION:   Right renal atrophy. No hydronephrosis or stones bilaterally.    < end of copied text > [facility]  INFECTIOUS DISEASES AND INTERNAL MEDICINE at [place]  =======================================================  Lorenzo Pierson MD  Diplomates American Board of Internal Medicine and Infectious Diseases  Tel: [phone]      Fax: [phone]  =======================================================    MELISSA BRADFORD [id number]    Follow up: Klebsiella UTI     No fever or chills      Allergies:  No Known Allergies       REVIEW OF SYSTEMS:  CONSTITUTIONAL:  No Fever or chills  HEENT:  No diplopia or blurred vision.  No earache, sore throat or runny nose.  CARDIOVASCULAR:  No pressure, squeezing, strangling, tightness, heaviness or aching about the chest, neck, axilla or epigastrium.  RESPIRATORY:  No cough, shortness of breath  GASTROINTESTINAL:  No nausea, vomiting or diarrhea.  GENITOURINARY:  No dysuria, frequency or urgency. No Blood in urine  MUSCULOSKELETAL:  no joint aches, no muscle pain  SKIN:  No change in skin, hair or nails.  NEUROLOGIC:  No Headaches  PSYCHIATRIC:  No disorder of thought or mood.  ENDOCRINE:  No heat or cold intolerance  HEMATOLOGICAL:  No easy bruising or bleeding.       Physical Exam:  GEN: NAD, pleasant  HEENT: normocephalic and atraumatic. EOMI. PERRL.  Anicteric  NECK: Supple.   LUNGS: Clear to auscultation.  HEART: Regular rate and rhythm   ABDOMEN: Soft, nontender, and nondistended.  Positive bowel sounds.    : No CVA tenderness  EXTREMITIES: Without any edema.  MSK: No joint swelling  NEUROLOGIC: Alert and oriented x3  PSYCHIATRIC: Appropriate affect .  SKIN: No Rash      Vitals:  T(F): 98.4 (11 Jun 2020 08:16), Max: 98.9 (11 Jun 2020 01:05)  HR: 107 (11 Jun 2020 08:16)  BP: 150/81 (11 Jun 2020 08:16)  RR: 19 (11 Jun 2020 08:16)  SpO2: 93% (11 Jun 2020 08:16) (93% - 97%)  temp max in last 48H T(F): , Max: 98.9 (06-11-20 @ 01:05)      Current Antibiotics:  cefTRIAXone   IVPB 1000 milliGRAM(s) IV Intermittent every 24 hours      Other medications:  aspirin  chewable 81 milliGRAM(s) Oral daily  atorvastatin 20 milliGRAM(s) Oral at bedtime  cholecalciferol 1000 Unit(s) Oral daily  enoxaparin Injectable 40 milliGRAM(s) SubCutaneous daily  hydrALAZINE 50 milliGRAM(s) Oral three times a day  lactated ringers. 1000 milliLiter(s) IV Continuous <Continuous>  methylPREDNISolone 2 milliGRAM(s) Oral every other day  metoprolol tartrate 50 milliGRAM(s) Oral two times a day  sodium bicarbonate 1300 milliGRAM(s) Oral three times a day      Labs:                        10.6   12.14 )-----------( 285      ( 11 Jun 2020 06:00 )             33.4      06-11    140  |  104  |  17.0  ----------------------------<  91  4.1   |  27.0  |  0.66    Ca    8.7      11 Jun 2020 06:00  Phos  2.8     06-11  Mg     1.9     06-11        Culture - Urine (collected 06-08-20 @ 16:45)  Source: .Urine Clean Catch (Midstream)  Final Report (06-10-20 @ 12:31):    >100,000 CFU/ml Klebsiella pneumoniae  Organism: Klebsiella pneumoniae (06-10-20 @ 12:31)  Organism: Klebsiella pneumoniae (06-10-20 @ 12:31)    Sensitivities:      -  Amikacin: S <=16      -  Ampicillin: R >16 These ampicillin results predict results for amoxicillin      -  Ampicillin/Sulbactam: S <=8/4 Enterobacter, Citrobacter, and Serratia may develop resistance during prolonged therapy (3-4 days)      -  Aztreonam: S <=4      -  Cefazolin: S <=8 (MIC_CL_COM_ENTERIC_CEFAZU) For uncomplicated UTI with K. pneumoniae, E. coli, or P. mirablis: NONA <=16 is sensitive and NONA >=32 is resistant. This also predicts results for oral agents cefaclor, cefdinir, cefpodoxime, cefprozil, cefuroxime axetil, cephalexin and locarbef for uncomplicated UTI. Note that some isolates may be susceptible to these agents while testing resistant to cefazolin.      -  Cefepime: S <=4      -  Cefoxitin: S <=8      -  Ceftriaxone: S <=1 Enterobacter, Citrobacter, and Serratia may develop resistance during prolonged therapy      -  Ciprofloxacin: S <=1      -  Gentamicin: S <=4      -  Imipenem: S <=1      -  Levofloxacin: S <=2      -  Meropenem: S <=1      -  Nitrofurantoin: S <=32 Should not be used to treat pyelonephritis      -  Piperacillin/Tazobactam: S <=16      -  Tigecycline: S <=2      -  Tobramycin: S <=4      -  Trimethoprim/Sulfamethoxazole: R >2/38      Method Type: NONA      WBC Count: 12.14 K/uL (06-11-20 @ 06:00)  WBC Count: 14.53 K/uL (06-10-20 @ 06:29)  WBC Count: 14.30 K/uL (06-08-20 @ 06:38)    Creatinine, Serum: 0.66 mg/dL (06-11-20 @ 06:00)  Creatinine, Serum: 0.67 mg/dL (06-10-20 @ 06:29)  Creatinine, Serum: 0.79 mg/dL (06-08-20 @ 06:38)    C-Reactive Protein, Serum: 0.73 mg/dL (06-05-20 @ 07:31)    Ferritin, Serum: 570 ng/mL (06-05-20 @ 07:31)    Procalcitonin, Serum: 0.23 ng/mL (06-05-20 @ 07:31)    COVID-19 PCR: NotDetec (06-04-20 @ 17:24)      < from: US Renal (06.10.20 @ 16:22) >  EXAM:  US KIDNEY(S)                          PROCEDURE DATE:  06/10/2020      INTERPRETATION:  CLINICAL INFORMATION: Urinary tract infection    COMPARISON: None available.    TECHNIQUE: Sonography of the kidneys and bladder.     FINDINGS:  Right kidney:  6.8 cm. the right kidney is atrophic and echogenic. No discrete mass or hydronephrosis is seen.  Left kidney:  12.1 cm. No renal mass, hydronephrosis or calculi.    Urinary bladder: Within normal limits.    IMPRESSION:   Right renal atrophy. No hydronephrosis or stones bilaterally.    < end of copied text >

## 2020-06-11 NOTE — PROGRESS NOTE ADULT - ASSESSMENT
1) RAINE - hold ace-I  --> renal consult appreciated  --> improved    2) Weakness - likley secondary to dehydration   -->had residual weakness from prior cva  --> spoke to pt, dc to [facility]    3) htn - currently hypotensive  --> hold all bp meds    4) History of cva - cw asa and statin     5) Bilateral GGO - covid neg  --> likely from history of lung ca    6) t wave inversions on ekg  --> consulted cardio    7) metabolic acidosis - secondary to raine  --> agree with sodium bicarb     spoke to case management, starting [facility] for [facility],
1) RAINE -improving with iv hydration   renal follow up appreciated   cont to hold ace inh     -2) Weakness - likley secondary to dehydration   will get MR of brain r/o stroke , has gait problems fell   --> spoke to pt, dc to [facility]    3) htn - on admission hypotensive resolved   cont metoprolol      --> hold ace inh due to ARF     4) History of cva - cw asa and statin   MR ordered    5) Bilateral GGO - covid neg  --> likely from history of lung ca    6) t wave inversions on ekg  --> consulted cardio  troponin neg , ECHO no wall motion abn   will refer to outpatient cardiology ischemic wrk up per cardiologist   cont aspirin , pt is asymptomatic     7) metabolic acidosis - secondary to raine  --> agree with sodium bicarb   improving   DVt prophylaxis   discharge to [facility] soon
70y Female with prior history of lung CA, copd not on home o2, CVA with left sided residual weakness, htn and rheumatoid arthritis presents with worsening weakness especially on the left side, lightheadedness and a mechanical fall. Patient states her rheumatologist started her on po steroids and hydroxychloroquine and ever since than has not felt well.   Found to have RAINE with CR greater than 3 and was also hypotensive, evaluated by nephrology , iv hydration given , in the ED CT of spines, head were performed no acute pathology ,cr and hypotension improving with fluid     1) RAINE -improved   prerenal azotemia   os/p iv hydration   renal fallow up noted   -2) Weakness - likley secondary to dehydration   refusing Mr of head to evaluate for CVA   will get repeat CT of head   cont PT ambulate     3) htn - with hypotension on admission   cont metoprolol BP is improved   cont to hold ace inh due to  4) History of cva - cw asa and statin     5)Abnormal ECG ; t wave inversions on ekg  --> consulted cardio no further inpatient work up   no acute symptoms ,troponin neg , ECHO no wall motion abn   cont aspirin     6- metabolic acidosis - secondary to raine  improving   DVt prophylaxis   discharge to [facility] in 24 hours   will check TSH , vit D levels
70y Female with prior history of lung CA, copd not on home o2, CVA with left sided residual weakness, htn and rheumatoid arthritis presents with worsening weakness especially on the left side, lightheadedness and a mechanical fall. Patient states her rheumatologist started her on po steroids and hydroxychloroquine and ever since than has not felt well.   Found to have RAINE with CR greater than 3 and was also hypotensive, evaluated by nephrology , iv hydration given , in the ED CT of spines, head were performed no acute pathology ,cr and hypotension improving with fluid , Ct of head repeat no acute infarcts , UA positive nitrite ,cbc is worsening  leukocytosis     1) RAINE - improved   prerenal azotemia   s/p iv hydration   renal signed off  encourage oral fluid intake     2- Abnormal urine tests ; suspected UTI   urine cx ordered   cont iv rocephin     3-Weakness - likely  secondary to dehydration and old CVA   refused Mr of head , CT of head repeated no acute infarcts    cont PT     4) htn - with hypotension on admission ,meds were held   now increasing will resume hydralazine   cont metoprolol, monitor closely       5)Abnormal ECG ; t wave inversions on ekg   cardio consulted ,  no further inpatient work up   no acute symptoms ,troponin neg , ECHO no wall motion abn   cont aspirin     6- metabolic acidosis - secondary to raine  resolving   cont      DVT  prophylaxis
70y Female with prior history of lung CA, copd not on home o2, CVA with left sided residual weakness, htn and rheumatoid arthritis presents with worsening weakness especially on the left side, lightheadedness and a mechanical fall. Patient states her rheumatologist started her on po steroids and hydroxychloroquine and ever since than has not felt well.   Found to have RAINE with CR greater than 3 and was also hypotensive, evaluated by nephrology , iv hydration given , in the ED CT of spines, head were performed no acute pathology ,cr and hypotension improving with fluid , Ct of head repeat no acute infarcts , UA positive nitrite ,cbc is worsening  leukocytosis , cx ordered positive klabsiella     1) RAINE - improved   prerenal azotemia   s/p iv hydration   renal signed off  encourage oral fluid intake     2- Hypokalemia - replace po k ordered   repeat lytes in am     3- Klabsiella UTI   ID input appreciated   cont iv rocephin trend wbc if improving likely will discharge to [facility] with po abx     4-Weakness - likely  secondary to dehydration and old CVA   refused Mr of head , CT of head repeated no acute infarcts    cont PT     5) htn - with hypotension on admission   now high , will cont to adjust meds increase dose for better control  ACE inh held  due to ARF   now increasing will resume hydralazine   cont metoprolol, monitor closely       6- metabolic acidosis - secondary to raine  resolved   cont      DVT  prophylaxis
70y Female with prior history of lung CA, copd not on home o2, CVA with left sided residual weakness, htn and rheumatoid arthritis presents with worsening weakness especially on the left side, lightheadedness and a mechanical fall. Patient states her rheumatologist started her on po steroids and hydroxychloroquine and ever since than has not felt well.   Found to have RAINE with CR greater than 3 and was also hypotensive, evaluated by nephrology , iv hydration given , in the ED CT of spines, head were performed no acute pathology ,cr and hypotension improving with fluid , Ct of head repeat no acute infarcts , UA positive nitrite ,cbc is worsening  leukocytosis , cx ordered positive klabsiella , leukocytosis improving renal sono performed ; right kidney atrophy       1- Kalbsiella UTI   cont rocephin   renal sono result reviewed   ID follow up discharge soon to [facility] with po abx     2- ARF with CRF stage 3   improved   avoid ace inh     3-Weakness - likely  secondary to dehydration and old CVA and RA   cont PT ambulate   [facility]     4- htn - with hypotension on admission resolved   high BP   cont to adjust meds for better control     5- metabolic acidosis - secondary to raine  resolved with oral bicarbonate        Discharge soon
70y Female with prior history of lung CA, copd not on home o2, CVA with left sided residual weakness, htn and rheumatoid arthritis presents with worsening weakness especially on the left side, lightheadedness and a mechanical fall. Patient states her rheumatologist started her on po steroids and hydroxychloroquine and ever since than has not felt well.   Found to have RAINE with CR greater than 3 and was also hypotensive, evaluated by nephrology , iv hydration given , in the ED CT of spines, head were performed no acute pathology ,cr and hypotension improving with fluid , Ct of head repeat no acute infarcts , UA positive nitrite ,cbc is worsening leukocytosis     1) RAINE -improved   prerenal azotemia   s/p iv hydration   renal signed off   encourage oral fluid intake     2- Abnormal urine tests   + nitrite   suspected UTI , will get urine cx start empirical iv rocephin   monitor cvx and wbc     3-Weakness - likely  secondary to dehydration and old CVA   refused Mr of head , CT of head repeated no acute infarcts   improving cont PT     4) htn - with hypotension on admission ,meds were held   now increasing will resume hydralazine   cont metoprolol, monitor closely       5)Abnormal ECG ; t wave inversions on ekg   cardio consulted ,  no further inpatient work up   no acute symptoms ,troponin neg , ECHO no wall motion abn   cont aspirin     6- metabolic acidosis - secondary to raine  resolved      DVt prophylaxis
70y Female with prior history of lung CA, copd not on home o2, CVA with left sided residual weakness, htn and rheumatoid arthritis presents with worsening weakness especially on the left side, lightheadedness and a mechanical fall. Patient states her rheumatologist started her on po steroids and hydroxychloroquine and ever since than has not felt well.   Found to have RAINE with CR greater than 3 and was also hypotensive.   ECG with ST changes in the inferior leads    Abnormal ECG  - Echo nl LV function no segmental wall motion abnormalities  - negative troponins  - Will obtain outpatient CAD evaluation    CVA  - Continue ASA    HTN  - BP controlled on metoprolol    Dyslipidemia  - Continue statin
70y Female with prior history of lung CA, copd not on home o2, CVA with left sided residual weakness, htn and rheumatoid arthritis presents with worsening weakness especially on the left side, lightheadedness and a mechanical fall. Patient states her rheumatologist started her on po steroids and hydroxychloroquine and ever since than has not felt well. Found to have RAINE with CR greater than 3 and was also hypotensive.   ECG with ST changes in the inferior leads    Abnormal ECG  - Echo nl LV function no segmental wall motion abnormalities  - negative troponins  - Outpatient CAD evaluation    RAINE  - renal function improved    CVA  - Continue ASA    HTN  - BP controlled on metoprolol    Dyslipidemia  - Continue statin
RAINE on CKD suspect stage III - improved   Suspect hypoperfusion renal function improved w IVF  CT abd pelvis noted R kidney atrophy,  no hydronephrosis  Elevated BUN likely due to steroids  Metabolic acidosis better with oral bicarb   ACE held
RAINE on CKD suspect stage III - improved   Suspect hypoperfusion renal function improved w IVF  CT abd pelvis noted R kidney atrophy,  no hydronephrosis  Elevated BUN likely due to steroids  Metabolic acidosis better with oral bicarb   ACE held
RAINE on CKD suspect stage III - improved   Suspect hypoperfusion renal function improved w IVF  CT abd pelvis noted R kidney atrophy,  no hydronephrosis  Elevated BUN likely due to steroids  Metabolic acidosis better with oral bicarb   ACE held   Will sign off   Recontact if needed
RAINE on CKD suspect stage III - resolved cr 1.0 yest  Suspect hypoperfusion renal function improved w IVF  CT abd pelvis noted R kidney atrophy,  no hydronephrosis  Elevated BUN likely due to steroids  Metabolic acidosis better with oral bicarb   If discharged needs f/u in our office within 2 weeks  AM labs    Will follow
70y  Female with h/o lung CA, COPD not on home O2, CVA with left sided residual weakness, HTN and rheumatoid arthritis. Patient initially presents with worsening weakness especially on the left side, lightheadedness and a mechanical fall. Patient states her rheumatologist started her on po steroids and hydroxychloroquine and ever since than has not felt well. Found to have RAINE with creatinine of greater than 3 and was also hypotensive. Patient was evaluated by nephrology. In the ED CT of spines, head, chest, abd/pel were performed no acute pathology. Patient was started on Ceftriaxone for possible UTI on 6/8 and switched to Meropenem 6/10.      Klebsiella UTI   Leukocytosis       - Urine culture klebsiella   - COVID 19 PCR negative  - CT A/P with no acute findings   - UA reporting pyuria  - Renal US reporting no acute infectious findings   - Procalcitonin level 0.23  - D/C Ceftriaxone  - Start Cefpodoxime 200mg PO BID till 6/14/20  - Need urology eval which can be done as outpatient  - Trend Fever  - Trend Leukocytosis      Will Sign off. please call PRN.

## 2020-06-11 NOTE — PROGRESS NOTE ADULT - SUBJECTIVE AND OBJECTIVE BOX
follow up for ARF .  weakness resolving , UTI   she is seen earlier today , continue to feel weak better , no urinary symptoms   pt was treated in March for UTI apparently by her rheumotologist       Vital Signs Last 24 Hrs  T(C): 36.9 (11 Jun 2020 08:16), Max: 37.2 (11 Jun 2020 01:05)  T(F): 98.4 (11 Jun 2020 08:16), Max: 98.9 (11 Jun 2020 01:05)  HR: 107 (11 Jun 2020 08:16) (94 - 119)  BP: 150/81 (11 Jun 2020 08:16) (146/87 - 176/86)  BP(mean): --  RR: 19 (11 Jun 2020 08:16) (19 - 26)  SpO2: 93% (11 Jun 2020 08:16) (93% - 97%)  	GENERAL: No distress sitting in the bed     Chest : clear to auscultation   	HEART: Regular rate and rhythm; s1/s2 No murmurs, rubs, or gallops  	ABDOMEN: Soft, Nontender, Nondistended; Bowel sounds present  	EXTREMITIES:  No edema                                             10.6   12.14 )-----------( 285      ( 11 Jun 2020 06:00 )             33.4   06-11    140  |  104  |  17.0  ----------------------------<  91  4.1   |  27.0  |  0.66    Ca    8.7      11 Jun 2020 06:00  Phos  2.8     06-11  Mg     1.9     06-11            < from: US Renal (06.10.20 @ 16:22) >  MPRESSION:   Right renal atrophy. No hydronephrosis or stones bilaterally.    < end of copied text >      < end of copied text >  Urine Microscopic-Add On (NC) (06.05.20 @ 08:11)    Red Blood Cell - Urine: 0-2 /HPF    White Blood Cell - Urine: 3-5    Bacteria: Occasional    Epithelial Cells: Occasional

## 2020-07-08 ENCOUNTER — OUTPATIENT (OUTPATIENT)
Dept: OUTPATIENT SERVICES | Facility: HOSPITAL | Age: 70
LOS: 1 days | End: 2020-07-08
Payer: MEDICARE

## 2020-07-08 DIAGNOSIS — I63.9 CEREBRAL INFARCTION, UNSPECIFIED: ICD-10-CM

## 2020-07-08 DIAGNOSIS — R51 HEADACHE: ICD-10-CM

## 2020-07-08 DIAGNOSIS — D25.9 LEIOMYOMA OF UTERUS, UNSPECIFIED: Chronic | ICD-10-CM

## 2020-07-08 DIAGNOSIS — Z87.42 PERSONAL HISTORY OF OTHER DISEASES OF THE FEMALE GENITAL TRACT: Chronic | ICD-10-CM

## 2020-07-08 PROCEDURE — 70551 MRI BRAIN STEM W/O DYE: CPT | Mod: 26

## 2020-07-08 PROCEDURE — 70551 MRI BRAIN STEM W/O DYE: CPT

## 2020-07-17 ENCOUNTER — OUTPATIENT (OUTPATIENT)
Dept: OUTPATIENT SERVICES | Facility: HOSPITAL | Age: 70
LOS: 1 days | End: 2020-07-17
Payer: MEDICARE

## 2020-07-17 DIAGNOSIS — D25.9 LEIOMYOMA OF UTERUS, UNSPECIFIED: Chronic | ICD-10-CM

## 2020-07-17 DIAGNOSIS — R51 HEADACHE: ICD-10-CM

## 2020-07-17 DIAGNOSIS — Z87.42 PERSONAL HISTORY OF OTHER DISEASES OF THE FEMALE GENITAL TRACT: Chronic | ICD-10-CM

## 2020-07-17 PROCEDURE — 72142 MRI NECK SPINE W/DYE: CPT

## 2020-07-17 PROCEDURE — 72142 MRI NECK SPINE W/DYE: CPT | Mod: 26

## 2020-07-20 ENCOUNTER — RX RENEWAL (OUTPATIENT)
Age: 70
End: 2020-07-20

## 2020-07-20 RX ORDER — UMECLIDINIUM BROMIDE AND VILANTEROL TRIFENATATE 62.5; 25 UG/1; UG/1
62.5-25 POWDER RESPIRATORY (INHALATION) DAILY
Qty: 1 | Refills: 1 | Status: ACTIVE | COMMUNITY
Start: 2019-03-15 | End: 1900-01-01

## 2021-04-22 NOTE — PATIENT PROFILE ADULT. - ASSIST WITH
walking/standing/toileting Paramedian Forehead Flap Text: A decision was made to reconstruct the defect utilizing an interpolation axial flap and a staged reconstruction.  A telfa template was made of the defect.  This telfa template was then used to outline the paramedian forehead pedicle flap.  The donor area for the pedicle flap was then injected with anesthesia.  The flap was excised through the skin and subcutaneous tissue down to the layer of the underlying musculature.  The pedicle flap was carefully excised within this deep plane to maintain its blood supply.  The edges of the donor site were undermined.   The donor site was closed in a primary fashion.  The pedicle was then rotated into position and sutured.  Once the tube was sutured into place, adequate blood supply was confirmed with blanching and refill.  The pedicle was then wrapped with xeroform gauze and dressed appropriately with a telfa and gauze bandage to ensure continued blood supply and protect the attached pedicle.

## 2021-07-03 NOTE — ASU PREOP CHECKLIST - BP NONINVASIVE SYSTOLIC (MM HG)
Oriented to Pp unit . Instructed to call if needs help/assistance . Call light within reach. Discussed pain meds when needed.    152

## 2021-09-13 ENCOUNTER — RX RENEWAL (OUTPATIENT)
Age: 71
End: 2021-09-13

## 2022-04-05 NOTE — ED ADULT NURSE NOTE - NS ED NURSE LEVEL OF CONSCIOUSNESS SPEECH
Anesthesia Pre Eval Note    Anesthesia ROS/Med Hx    Overall Review:  EKG was reviewed, Echo was reviewed and Stress test was reviewed       Pulmonary Review:    Positive for COPD - Severity: mild      Cardiovascular Review:  Exercise tolerance: good (>4 METS)  Positive for hypertension - well controlled    GI/HEPATIC/RENAL Review:    Positive for bowel prep  Additional Results:  EKG:  No results found for this or any previous visit (from the past 4464 hour(s)). Echo:  No results found for: EFStress Test:  No valid procedures specified.     ALLERGIES:   -- Penicillin G -- RASH       Lab Results       Component                Value               Date                       WBC                      6.3                 05/11/2021                 RBC                      4.36                05/11/2021                 HGB                      14.4                05/11/2021                 HCT                      44.9                05/11/2021                 MCHC                     32.1                05/11/2021                 SODIUM                   139                 05/11/2021                 POTASSIUM                4.3                 05/11/2021                 CHLORIDE                 104                 05/11/2021                 CO2                      30                  05/11/2021                 GLUCOSE                  110 (H)             05/11/2021                 BUN                      17                  05/11/2021                 CREATININE               0.81                05/11/2021                 GFRESTIMATE              73 (L)              05/11/2021                 CALCIUM                  10.0                05/11/2021                 PLT                      226                 05/11/2021             Past Medical History:  No date: COPD (chronic obstructive pulmonary disease) (CMS/HCC)  No date: Essential (primary) hypertension    Past Surgical History:  No date: Appendectomy  No date:   section, low transverse  No date: Removal of ovarian cyst(s)       Prior to Admission medications :  Medication amLODIPine (NORVASC) 5 MG tablet, Sig , Start Date 21, End Date , Taking? , Authorizing Provider Outside Provider    Medication lisinopril-hydroCHLOROthiazide (ZESTORETIC) 20-25 MG per tablet, Sig , Start Date 21, End Date , Taking? , Authorizing Provider Outside Provider    Medication bisoprolol (ZEBETA) 5 MG tablet, Sig , Start Date 21, End Date , Taking? , Authorizing Provider Outside Provider    Medication Symbicort 160-4.5 MCG/ACT inhaler, Sig 2 puffs 2 times daily., Start Date 20, End Date , Taking? , Authorizing Provider Outside Provider         Patient Vitals in the past 24 hrs:      Relevant Problems   No relevant active problems       Physical Exam     Airway   Mallampati: III  TM Distance: >3 FB  Neck ROM: Full  Neck: Non-tender and Able to place in sniff position  TMJ Mobility: Good    Cardiovascular  Cardiovascular exam normal  Cardio Rhythm: Regular  Cardio Rate: Normal    Head Assessment  Head assessment: Normocephalic and Atraumatic    General Assessment  General Assessment: Alert and oriented and No acute distress    Dental Exam    Patient has:  Upper dentures and Denied broken/chipped/loose teeth    Pulmonary Exam  Pulmonary exam normal  Breath sounds clear to auscultation:  Yes    Abdominal Exam  Abdominal exam normal      Anesthesia Plan:    ASA Status: 2  Anesthesia Type: General    Induction: Intravenous  Preferred Airway Type: Nasal Cannula  Maintenance: TIVA  Premedication: None      Post-op Pain Management: Per Surgeon      Checklist  Reviewed: NPO Status, Allergies, Medications, Problem list, Past Med History, Lab Results, Consultations, DNR Status, Chest X-Rays, EKG, Patient Summary, Beta Blocker Status and Nursing Notes  Consent/Risks Discussed Statement:  The proposed anesthetic plan, including its risks and benefits, have been discussed with the  Patient along with the risks and benefits of alternatives. Questions were encouraged and answered and the patient and/or representative understands and agrees to proceed.    I have discussed elements of the patient's history or examination, as noted above and/or as follows, that place the patient at higher risk of complications; age and heart disease.    I discussed with the patient (and/or patient's legal representative) the risks and benefits of the proposed anesthesia plan, General, which may include services performed by other anesthesia providers.    Alternative anesthesia plans, if available, were reviewed with the patient (and/or patient's legal representative). Discussion has been held with the patient (and/or patient's legal representative) regarding risks of anesthesia, which include Nausea, Vomiting, Dental Injury, allergic reaction, depressed breathing, memory loss, vomiting, nausea, emergence delirium, aspiration, back pain, anxiety, bleeding/hematoma, dental injury, intra-operative awareness, death, infection, persistent pain, post-op intubation, sore throat, need for blood transfusion, eye injury, headache, hypotension, ICU admit, organ damage, nerve injury and oral injury and emergent situations that may require change in anesthesia plan.    The patient (and/or patient's legal representative) has indicated understanding, his/her questions have been answered, and he/she wishes to proceed with the planned anesthetic.    Blood Products: Not Anticipated     Speaking Coherently

## 2022-04-10 NOTE — DISCHARGE NOTE PROVIDER - NSDCHHATTENDCERT_GEN_ALL_CORE
I have personally seen and examined the patient. I have collaborated with and supervised the
My signature below certifies that the above stated patient is homebound and upon completion of the Face-To-Face encounter, has the need for intermittent skilled nursing, physical therapy and/or speech or occupational therapy services in their home for their current diagnosis as outlined in their initial plan of care. These services will continue to be monitored by myself or another physician.

## 2023-01-11 NOTE — PROGRESS NOTE ADULT - PROVIDER SPECIALTY LIST ADULT
Done.  Diagnoses and all orders for this visit:    Obstructive sleep apnea syndrome  -     SERVICE TO HOME CARE RESPIRATORY THERAPY         Nephrology

## 2023-03-28 NOTE — PATIENT PROFILE ADULT - FALLEN IN THE PAST
Group home Angela Friend RN     no

## 2023-09-18 NOTE — PATIENT PROFILE ADULT. - PRO EXPECT LENGTH STAY
Active voiding trial completed at 1445, 300 cc instilled into bladder through catheter. After 30 minutes patient only able to void about 75 cc. Resident paged, await call back for new orders.    unsure

## 2023-10-31 NOTE — OCCUPATIONAL THERAPY INITIAL EVALUATION ADULT - PHYSICAL ASSIST/NONPHYSICAL ASSIST:DRESS UPPER BODY, OT EVAL
Patient called stated she seen orthopedic and he wants her to have physical therapy 3 times a week for six week. Wants to know if referral is set up so she can schedule appointment for physical therapy.     Best call back #[phone]
Patient has [place], orthopedic/ Dr. Marty Bearden has recommended her to do PT and given her an order, she plans to go to [facility] outside of [facility], would like to do [facility] [address] in [facility].     Dx codes: M75.01, J44.9108    Please put in referral.
verbal cues/1 person assist

## 2023-12-30 NOTE — H&P ADULT - PSH
PAST MEDICAL HISTORY:  Pregnant and not yet delivered in third trimester      Fibroid tumor    History of ovarian cyst

## 2024-02-20 NOTE — ED ADULT NURSE NOTE - NEURO ASSESSMENT
Patient with reports of fatigue, mild lightheadedness, no syncopal episodes. Etiology of fatigue is unclear at this time.   -TTE from 10/2023 with EF of 64% with no LV or RV abnormalities   -fatigue seems associated with onset of chest heaviness and palpitations   -fatigue possibly associated with PVCs/palpitations/arrythmia   -monitor of tele   -f/u TSH, vitamin levels WDL

## 2024-07-15 NOTE — PATIENT PROFILE ADULT - NSPROALCOHOLUSE2_GEN_A_NUR
"Subjective   Patient ID: Michelle Coles is a 63 y.o. female who presents for Hypertension (This morning was 157/89.  Shingrix #2 today ).    HPI   63 year-old female presents for fu     Last labs 1/2024-Lipids were up but had run out of the crestor a few wks prior- had restarted    BP has been up here - has been checking at home- Wrist cuff and has been consistently elevated 150-160s/80s    Last pap 8/2020 which was negative; and neg HPV; no hx of abnormal paps.  LMP end of 2014; no pelvic pain or vaginal changes.   does regular self breast exams. no changes.  2/2024 mammo- neg;      BMI 24  no regular exercise   eats a healthy diet  tried atorvastatin in 2017 but stopped bc felt achey; started on crestor in 7/2022- tolerating well;      3/2022 CT calcium score was zero     2/2024 DEXA +osteopenia.+osteopenia; was on fosamax yrs ago- stopped bc was having some dental issues.  hx of vit D defic  has been taking Calcium +Vitamin D supplement  started on Boniva 12/2021 and tolerating ok  Occ reflux issues.     has never had a Screening colonoscopy  11/2023 cologuard-negative;   Denies any changes in bowel habits, abdominal pain, diarrhea, constipation, blood in stool, melena.      Tetanus 2017   shingrix had #1- will get #2 today  flu shot-had  covid shots- had;    RSV-not yet    She denies any chest pains, palpitations, edema, shortness of breath, abdominal pains, reflux, nausea, vomiting, diarrhea, constipation, blood in stool, melena.     +stress- her mom recently passed away          Review of Systems  ROS as stated in HPI    Objective   /75   Pulse 72   Temp 36.6 °C (97.9 °F)   Ht 1.549 m (5' 1\")   Wt 59.7 kg (131 lb 9.6 oz)   BMI 24.87 kg/m²     Physical Exam  Constitutional: A&O; NAD  HEENT: conjunctiva normal. EOMI; PERRLA; lids normal; TM's clear;   Neck: supple; No lymphadenopathy   Heart: RRR     Lungs: CTA bilateral   Abd: Soft, Nontender, Nondistended  Ext:  No edema;    Neuro: No gross neuro " deficits     Psych: Judgment, orientation, mood, affect, insight wnl   Assessment/Plan   Problem List Items Addressed This Visit             ICD-10-CM    Hyperlipidemia E78.5    Relevant Orders    Lipid Panel    Comprehensive Metabolic Panel     Other Visit Diagnoses         Codes    Hypertension, unspecified type    -  Primary I10    Relevant Medications    losartan (Cozaar) 25 mg tablet    Other Relevant Orders    Comprehensive Metabolic Panel    Encounter for immunization     Z23    Relevant Orders    Zoster vaccine, recombinant, adult (SHINGRIX) (Completed)    Elevated glucose     R73.09    Relevant Orders    Hemoglobin A1C    Comprehensive Metabolic Panel          8/2020 pap was negative; and neg HPV;   2/2024 mammo -neg     11/2023 cologuard- neg;   tdap 2017  shingrix #2 given today  RSV-not yet  Had flu shot. Had COVID shots.  2/2024 DEXA +osteopenia. cont boniva and Calcium + Vitamin D supplement.    3/2022 CT calcium score 0  cont healthy lifestyle-diet, exercise   check labs.  Start losartan 25mg; cont to monitor BP at home and fu in a few mo to recheck or sooner if needed          yes

## 2024-08-01 ENCOUNTER — APPOINTMENT (OUTPATIENT)
Dept: AFTER HOURS CARE | Facility: EMERGENCY ROOM | Age: 74
End: 2024-08-01
Payer: MEDICARE

## 2024-08-01 DIAGNOSIS — R31.9 HEMATURIA, UNSPECIFIED: ICD-10-CM

## 2024-08-01 PROCEDURE — 99205 OFFICE O/P NEW HI 60 MIN: CPT

## 2024-08-01 RX ORDER — CEFPODOXIME PROXETIL 100 MG/1
100 TABLET, FILM COATED ORAL
Qty: 14 | Refills: 0 | Status: ACTIVE | COMMUNITY
Start: 2024-08-01 | End: 1900-01-01

## 2024-08-01 NOTE — HISTORY OF PRESENT ILLNESS
[Other Location: e.g. Home (Enter Location, City,State)___] : at [unfilled] [Verbal consent obtained from patient] : the patient, [unfilled] [FreeTextEntry8] : Elderly f with transverose myelitis, prior stroke with left sided paralysis, arthritis presents with hematuria.  Patient is incontinent and on elliquis for an "arrythmia."  Her PCP is at [facility] who comes out to her house in [facility] but he is on [place] for the summer. She has a home health aid with her that noticed blood when wiping her vaginal area and changing her diaper x 1.  Patient denies any dysuria, fever, increased weakness and ststaes she feels better than yesterday.  She states her home care doctor at [facility] do not come out to [facility] and friends are concerned she has a uti as she has had one in the past. He home care nurse is unable to straight cath her upon questioning to get a clean urine sample for testing.

## 2024-08-01 NOTE — PLAN
[FreeTextEntry1] :   Elderly f with transverse myelitis, prior CVA, arthritis that is bed bound presents with hematuria.  Patient is asymptomatic and has a history of a recent uti.  Home health aid confirms this is from her vaginal area and not rectum.  She does not have a doctor that can visit her on [facility] and feels well.  She prefers not to visit the ed.  I will prescribe a short course of antibiotics and advise f/u in the ED if this continues. I have also provided her the number for [facility] calls.

## 2024-08-01 NOTE — ASSESSMENT
[FreeTextEntry1] :   Elderly f with transverse myelitis, prior CVA, arthritis that is bed bound presents with hematuria.  Patient is asymptomatic and has a history of a recent uti.  Home health aid confirms this is from her vaginal area and not rectum.  She does not have a doctor that can visit her on [facility] and feels well.  She prefers not to visit the ed.  I will prescribe a short course of antibiotics and advise f/u in the ED if this continues. I have also provided her the number for [facility] calls.

## 2024-08-01 NOTE — PHYSICAL EXAM
[TextEntry] : PLEASE SEE HPI FOR ADDITIONAL RELEVANT PHYSICAL EXAM FINDINGS GENERAL: no acute distress, nontoxic HEAD: normocephalic EYES: no scleral icterus NECK: trachea midline, Full ROM RESP: no respiratory distress ABD: nondistended MSK: no gross deformity, at baseline per pt, friend and home health aide NEURO: alert & fully oriented SKIN: no rash PSYCH: cooperative, good insight, appropriate, fluent speech

## 2024-10-15 NOTE — PATIENT PROFILE ADULT - NSCAGESTDRUGEYEOP_GEN_A_NUR
With chronic low back pain (generally L>R with associated intermittent sciatica) with hx of spinal stenosis and reported spinal surgery  With recent acute exacerbation outpatient (without associated trauma) leading to present hospitalization  Likely related to sepsis in setting of presumed osteomyelitis per inpatient workup  Monitor pain - acute component gradually improving to baseline chronic pain level  Current regimen including: tylenol 975mg TID, baclofen 5mg TID PRN, lidocaine patch/voltaren gel, tramadol 25mg BID  Adjust/wean regimen as appropriate. Opioids had been stopped inpatient, have been reintroduced at lower dose for her chronic pain and seems to be tolerating well. Methocarbamol was not as effective and tizanidine seemed to cause sedation, seems better controlled with PRN baclofen. Use caution with pain regimen as she has had issues with constipation and with sedation with higher doses. As of 10/15 discontinued gabapentin as pain improved/stable recently  See plan under sepsis   no

## 2024-11-24 NOTE — PATIENT PROFILE ADULT. - NS TRANSFER PATIENT BELONGINGS
b/l leg swelling, redness and pain x more than a month, was prescribed a water pill has been taking for 2.5 weeks with no improvement, swelling is worse, denies sob, denies chest pain None

## 2025-03-27 NOTE — OCCUPATIONAL THERAPY INITIAL EVALUATION ADULT - PATIENT/FAMILY/SIGNIFICANT OTHER GOALS STATEMENT, OT EVAL
Covering provider review:  Med last addressed with PCP 10/3/24. Dose was increased and plan to do nurse and lab only visits after 3 weeks to recheck. This did not occur.  Had high BP in specialists office in November.    BP Readings from Last 6 Encounters:   11/05/24 (!) 173/95   10/03/24 (!) 147/96   06/25/24 (!) 148/88   06/12/24 (!) 148/90   06/03/24 134/84   01/17/24 139/40     Needs appointment for BP check. Needs to complete BMP previously ordered by PCP.  1 refill ok'd for now. Please schedule provider appointment to address HTN. Labs can be done at that visit.    "Go home"

## 2025-07-04 NOTE — H&P ADULT - NSHPPOAURINARYCATHETER_GEN_ALL_CORE
COVID-19 Test positive, discussed in detail on condition and course.  Adequate hydration and rest. Monitor the symptoms closely  Flu test negative today  Recommendation is to follow a timeline quarantine measures per CDC and [facility]  Tylenol or ibuprofen as needed for fever, body aches and headache. Antihistamine of choice for congestion.   Coricidin HBP for cough and cold as needed and as directed. Can try vitamin C, zinc 50 mg, vitamin D3 5000 IU for 1 week  Recommendation is to home quarantine until no fever (100.4 and above) for 24 hours and with improved symptoms   Discussed in detail on antiviral medication Paxlovid, defers today  [facility] IM today as anti inflammation, discussed in detail voiced understanding  Wear a mask, cover your cough and sneeze. Clean any shared surfaces  Call or return to clinic for any questions. ER precautions with any acute change in symptoms. Follow up with primary MD  For worsening sinus symptoms and signs of infection call this clinic will consider antibiotics   no